# Patient Record
Sex: FEMALE | Race: WHITE | NOT HISPANIC OR LATINO
[De-identification: names, ages, dates, MRNs, and addresses within clinical notes are randomized per-mention and may not be internally consistent; named-entity substitution may affect disease eponyms.]

---

## 2018-06-22 ENCOUNTER — APPOINTMENT (OUTPATIENT)
Dept: ANESTHESIOLOGY | Facility: CLINIC | Age: 68
End: 2018-06-22

## 2018-06-22 ENCOUNTER — OUTPATIENT (OUTPATIENT)
Dept: OUTPATIENT SERVICES | Facility: HOSPITAL | Age: 68
LOS: 1 days | End: 2018-06-22
Payer: COMMERCIAL

## 2018-06-22 DIAGNOSIS — M54.16 RADICULOPATHY, LUMBAR REGION: ICD-10-CM

## 2018-06-22 PROBLEM — Z00.00 ENCOUNTER FOR PREVENTIVE HEALTH EXAMINATION: Status: ACTIVE | Noted: 2018-06-22

## 2018-06-22 PROCEDURE — 62323 NJX INTERLAMINAR LMBR/SAC: CPT

## 2018-06-22 PROCEDURE — 82962 GLUCOSE BLOOD TEST: CPT

## 2018-06-25 DIAGNOSIS — E11.65 TYPE 2 DIABETES MELLITUS WITH HYPERGLYCEMIA: ICD-10-CM

## 2018-06-25 DIAGNOSIS — M54.17 RADICULOPATHY, LUMBOSACRAL REGION: ICD-10-CM

## 2018-07-20 ENCOUNTER — APPOINTMENT (OUTPATIENT)
Dept: ANESTHESIOLOGY | Facility: CLINIC | Age: 68
End: 2018-07-20

## 2018-07-20 ENCOUNTER — OUTPATIENT (OUTPATIENT)
Dept: OUTPATIENT SERVICES | Facility: HOSPITAL | Age: 68
LOS: 1 days | End: 2018-07-20
Payer: COMMERCIAL

## 2018-07-20 DIAGNOSIS — M54.17 RADICULOPATHY, LUMBOSACRAL REGION: ICD-10-CM

## 2018-07-20 PROCEDURE — 82962 GLUCOSE BLOOD TEST: CPT

## 2018-07-20 PROCEDURE — 62323 NJX INTERLAMINAR LMBR/SAC: CPT

## 2018-07-23 DIAGNOSIS — E11.65 TYPE 2 DIABETES MELLITUS WITH HYPERGLYCEMIA: ICD-10-CM

## 2018-11-30 ENCOUNTER — APPOINTMENT (OUTPATIENT)
Dept: ANESTHESIOLOGY | Facility: CLINIC | Age: 68
End: 2018-11-30

## 2018-11-30 ENCOUNTER — OUTPATIENT (OUTPATIENT)
Dept: OUTPATIENT SERVICES | Facility: HOSPITAL | Age: 68
LOS: 1 days | End: 2018-11-30
Payer: MEDICARE

## 2018-11-30 DIAGNOSIS — M54.16 RADICULOPATHY, LUMBAR REGION: ICD-10-CM

## 2018-11-30 DIAGNOSIS — M54.17 RADICULOPATHY, LUMBOSACRAL REGION: ICD-10-CM

## 2018-11-30 PROCEDURE — 82962 GLUCOSE BLOOD TEST: CPT

## 2018-11-30 PROCEDURE — 62323 NJX INTERLAMINAR LMBR/SAC: CPT

## 2019-02-23 ENCOUNTER — INPATIENT (INPATIENT)
Facility: HOSPITAL | Age: 69
LOS: 2 days | Discharge: HOME | End: 2019-02-26
Attending: INTERNAL MEDICINE | Admitting: INTERNAL MEDICINE
Payer: COMMERCIAL

## 2019-02-23 VITALS
DIASTOLIC BLOOD PRESSURE: 57 MMHG | WEIGHT: 139.99 LBS | TEMPERATURE: 98 F | SYSTOLIC BLOOD PRESSURE: 11 MMHG | RESPIRATION RATE: 14 BRPM | HEART RATE: 91 BPM | HEIGHT: 65 IN

## 2019-02-23 DIAGNOSIS — E11.42 TYPE 2 DIABETES MELLITUS WITH DIABETIC POLYNEUROPATHY: ICD-10-CM

## 2019-02-23 DIAGNOSIS — E03.9 HYPOTHYROIDISM, UNSPECIFIED: ICD-10-CM

## 2019-02-23 DIAGNOSIS — J18.9 PNEUMONIA, UNSPECIFIED ORGANISM: ICD-10-CM

## 2019-02-23 DIAGNOSIS — N17.9 ACUTE KIDNEY FAILURE, UNSPECIFIED: ICD-10-CM

## 2019-02-23 DIAGNOSIS — Z29.9 ENCOUNTER FOR PROPHYLACTIC MEASURES, UNSPECIFIED: ICD-10-CM

## 2019-02-23 DIAGNOSIS — G62.9 POLYNEUROPATHY, UNSPECIFIED: ICD-10-CM

## 2019-02-23 LAB
ALBUMIN SERPL ELPH-MCNC: 2.8 G/DL — LOW (ref 3.3–5)
ALP SERPL-CCNC: 81 U/L — SIGNIFICANT CHANGE UP (ref 40–120)
ALT FLD-CCNC: 29 U/L — SIGNIFICANT CHANGE UP (ref 12–78)
ANION GAP SERPL CALC-SCNC: 12 MMOL/L — SIGNIFICANT CHANGE UP (ref 5–17)
APTT BLD: 31.2 SEC — SIGNIFICANT CHANGE UP (ref 27.5–36.3)
AST SERPL-CCNC: 30 U/L — SIGNIFICANT CHANGE UP (ref 15–37)
BASE EXCESS BLDA CALC-SCNC: -2.7 MMOL/L — LOW (ref -2–2)
BILIRUB SERPL-MCNC: 0.6 MG/DL — SIGNIFICANT CHANGE UP (ref 0.2–1.2)
BLOOD GAS COMMENTS: SIGNIFICANT CHANGE UP
BLOOD GAS COMMENTS: SIGNIFICANT CHANGE UP
BLOOD GAS SOURCE: SIGNIFICANT CHANGE UP
BUN SERPL-MCNC: 29 MG/DL — HIGH (ref 7–23)
CALCIUM SERPL-MCNC: 8.9 MG/DL — SIGNIFICANT CHANGE UP (ref 8.5–10.1)
CHLORIDE SERPL-SCNC: 99 MMOL/L — SIGNIFICANT CHANGE UP (ref 96–108)
CK MB CFR SERPL CALC: 1 NG/ML — SIGNIFICANT CHANGE UP (ref 0.5–3.6)
CO2 SERPL-SCNC: 23 MMOL/L — SIGNIFICANT CHANGE UP (ref 22–31)
CREAT SERPL-MCNC: 2.16 MG/DL — HIGH (ref 0.5–1.3)
FLU A RESULT: SIGNIFICANT CHANGE UP
FLU A RESULT: SIGNIFICANT CHANGE UP
FLUAV AG NPH QL: SIGNIFICANT CHANGE UP
FLUBV AG NPH QL: SIGNIFICANT CHANGE UP
GLUCOSE SERPL-MCNC: 180 MG/DL — HIGH (ref 70–99)
HCO3 BLDA-SCNC: 21 MMOL/L — SIGNIFICANT CHANGE UP (ref 21–29)
HOROWITZ INDEX BLDA+IHG-RTO: 32 — SIGNIFICANT CHANGE UP
INR BLD: 1.17 RATIO — HIGH (ref 0.88–1.16)
LACTATE SERPL-SCNC: 5.1 MMOL/L — CRITICAL HIGH (ref 0.7–2)
MAGNESIUM SERPL-MCNC: 2.2 MG/DL — SIGNIFICANT CHANGE UP (ref 1.6–2.6)
NT-PROBNP SERPL-SCNC: 493 PG/ML — HIGH (ref 0–125)
PCO2 BLDA: 36 MMHG — SIGNIFICANT CHANGE UP (ref 32–46)
PH BLD: 7.39 — SIGNIFICANT CHANGE UP (ref 7.35–7.45)
PO2 BLDA: 71 MMHG — LOW (ref 74–108)
POTASSIUM SERPL-MCNC: 3.7 MMOL/L — SIGNIFICANT CHANGE UP (ref 3.5–5.3)
POTASSIUM SERPL-SCNC: 3.7 MMOL/L — SIGNIFICANT CHANGE UP (ref 3.5–5.3)
PROT SERPL-MCNC: 8.9 GM/DL — HIGH (ref 6–8.3)
PROTHROM AB SERPL-ACNC: 13.2 SEC — HIGH (ref 10–12.9)
RSV RESULT: SIGNIFICANT CHANGE UP
RSV RNA RESP QL NAA+PROBE: SIGNIFICANT CHANGE UP
SAO2 % BLDA: 94 % — SIGNIFICANT CHANGE UP (ref 92–96)
SODIUM SERPL-SCNC: 134 MMOL/L — LOW (ref 135–145)
TROPONIN I SERPL-MCNC: <.015 NG/ML — SIGNIFICANT CHANGE UP (ref 0.01–0.04)

## 2019-02-23 PROCEDURE — 71045 X-RAY EXAM CHEST 1 VIEW: CPT | Mod: 26

## 2019-02-23 PROCEDURE — 99223 1ST HOSP IP/OBS HIGH 75: CPT | Mod: AI

## 2019-02-23 PROCEDURE — 99291 CRITICAL CARE FIRST HOUR: CPT

## 2019-02-23 RX ORDER — DEXTROSE 50 % IN WATER 50 %
12.5 SYRINGE (ML) INTRAVENOUS ONCE
Qty: 0 | Refills: 0 | Status: DISCONTINUED | OUTPATIENT
Start: 2019-02-23 | End: 2019-02-26

## 2019-02-23 RX ORDER — AZITHROMYCIN 500 MG/1
500 TABLET, FILM COATED ORAL EVERY 24 HOURS
Qty: 0 | Refills: 0 | Status: DISCONTINUED | OUTPATIENT
Start: 2019-02-24 | End: 2019-02-26

## 2019-02-23 RX ORDER — DEXTROSE 50 % IN WATER 50 %
25 SYRINGE (ML) INTRAVENOUS ONCE
Qty: 0 | Refills: 0 | Status: DISCONTINUED | OUTPATIENT
Start: 2019-02-23 | End: 2019-02-26

## 2019-02-23 RX ORDER — LANOLIN ALCOHOL/MO/W.PET/CERES
3 CREAM (GRAM) TOPICAL ONCE
Qty: 0 | Refills: 0 | Status: COMPLETED | OUTPATIENT
Start: 2019-02-23 | End: 2019-02-23

## 2019-02-23 RX ORDER — INSULIN LISPRO 100/ML
VIAL (ML) SUBCUTANEOUS
Qty: 0 | Refills: 0 | Status: DISCONTINUED | OUTPATIENT
Start: 2019-02-23 | End: 2019-02-26

## 2019-02-23 RX ORDER — LEVOTHYROXINE SODIUM 125 MCG
25 TABLET ORAL DAILY
Qty: 0 | Refills: 0 | Status: DISCONTINUED | OUTPATIENT
Start: 2019-02-23 | End: 2019-02-26

## 2019-02-23 RX ORDER — AZITHROMYCIN 500 MG/1
500 TABLET, FILM COATED ORAL ONCE
Qty: 0 | Refills: 0 | Status: COMPLETED | OUTPATIENT
Start: 2019-02-23 | End: 2019-02-23

## 2019-02-23 RX ORDER — GLUCAGON INJECTION, SOLUTION 0.5 MG/.1ML
1 INJECTION, SOLUTION SUBCUTANEOUS ONCE
Qty: 0 | Refills: 0 | Status: DISCONTINUED | OUTPATIENT
Start: 2019-02-23 | End: 2019-02-26

## 2019-02-23 RX ORDER — IPRATROPIUM/ALBUTEROL SULFATE 18-103MCG
3 AEROSOL WITH ADAPTER (GRAM) INHALATION EVERY 6 HOURS
Qty: 0 | Refills: 0 | Status: DISCONTINUED | OUTPATIENT
Start: 2019-02-23 | End: 2019-02-26

## 2019-02-23 RX ORDER — SODIUM CHLORIDE 9 MG/ML
2500 INJECTION INTRAMUSCULAR; INTRAVENOUS; SUBCUTANEOUS ONCE
Qty: 0 | Refills: 0 | Status: COMPLETED | OUTPATIENT
Start: 2019-02-23 | End: 2019-02-23

## 2019-02-23 RX ORDER — CEFTRIAXONE 500 MG/1
1 INJECTION, POWDER, FOR SOLUTION INTRAMUSCULAR; INTRAVENOUS ONCE
Qty: 0 | Refills: 0 | Status: COMPLETED | OUTPATIENT
Start: 2019-02-23 | End: 2019-02-23

## 2019-02-23 RX ORDER — CEFTRIAXONE 500 MG/1
1 INJECTION, POWDER, FOR SOLUTION INTRAMUSCULAR; INTRAVENOUS EVERY 24 HOURS
Qty: 0 | Refills: 0 | Status: DISCONTINUED | OUTPATIENT
Start: 2019-02-24 | End: 2019-02-26

## 2019-02-23 RX ORDER — HEPARIN SODIUM 5000 [USP'U]/ML
5000 INJECTION INTRAVENOUS; SUBCUTANEOUS EVERY 12 HOURS
Qty: 0 | Refills: 0 | Status: DISCONTINUED | OUTPATIENT
Start: 2019-02-23 | End: 2019-02-26

## 2019-02-23 RX ORDER — GABAPENTIN 400 MG/1
300 CAPSULE ORAL THREE TIMES A DAY
Qty: 0 | Refills: 0 | Status: DISCONTINUED | OUTPATIENT
Start: 2019-02-23 | End: 2019-02-24

## 2019-02-23 RX ORDER — SODIUM CHLORIDE 9 MG/ML
1000 INJECTION, SOLUTION INTRAVENOUS
Qty: 0 | Refills: 0 | Status: DISCONTINUED | OUTPATIENT
Start: 2019-02-23 | End: 2019-02-26

## 2019-02-23 RX ORDER — DEXTROSE 50 % IN WATER 50 %
15 SYRINGE (ML) INTRAVENOUS ONCE
Qty: 0 | Refills: 0 | Status: DISCONTINUED | OUTPATIENT
Start: 2019-02-23 | End: 2019-02-26

## 2019-02-23 RX ADMIN — AZITHROMYCIN 255 MILLIGRAM(S): 500 TABLET, FILM COATED ORAL at 21:39

## 2019-02-23 RX ADMIN — CEFTRIAXONE 100 GRAM(S): 500 INJECTION, POWDER, FOR SOLUTION INTRAMUSCULAR; INTRAVENOUS at 20:03

## 2019-02-23 RX ADMIN — SODIUM CHLORIDE 2500 MILLILITER(S): 9 INJECTION INTRAMUSCULAR; INTRAVENOUS; SUBCUTANEOUS at 21:43

## 2019-02-23 NOTE — H&P ADULT - NSHPPHYSICALEXAM_GEN_ALL_CORE
Vital Signs Last 24 Hrs  T(C): 36.9 (23 Feb 2019 21:41), Max: 36.9 (23 Feb 2019 21:41)  T(F): 98.5 (23 Feb 2019 21:41), Max: 98.5 (23 Feb 2019 21:41)  HR: 92 (23 Feb 2019 21:41) (91 - 92)  BP: 115/62 (23 Feb 2019 21:41) (11/57 - 115/62)  BP(mean): --  RR: 18 (23 Feb 2019 21:41) (14 - 18)  SpO2: 98% (23 Feb 2019 21:41) (98% - 98%)    PHYSICAL EXAM:    GENERAL: thin frail appearing female appears mildly sob.  able to speak comfortably  HEAD:  Atraumatic, Normocephalic  EYES: EOMI, PERRLA, conjunctiva and sclera clear  ENMT: No tonsillar erythema, exudates, or enlargement; mucous membranes dry, No lesions  NECK: Supple, No JVD, Normal thyroid  NERVOUS SYSTEM:  Alert & Oriented X3, Good concentration; Motor Strength 5/5 B/L upper and lower extremities; DTRs 2+ intact and symmetric  CHEST/LUNG: decreased bs through out scattered wheeze,   HEART: Regular rate and rhythm; No rubs, or gallops, +S1,S2  ABDOMEN: Soft, Nontender, Nondistended; Bowel sounds present  EXTREMITIES:  2+ Peripheral Pulses, No clubbing, cyanosis, or edema  LYMPH: No cervical adenopathy  RECTAL: deferred  BREAST: deferred  : deferred  SKIN: No rashes or lesions    IMPROVE VTE Individual Risk Assessment          RISK                                                          Points  [  ] Previous VTE                                                3  [  ] Thrombophilia                                             2  [  ] Lower limb paralysis                                    2        (unable to hold up >15 seconds)    [  ] Current Cancer                                             2         (within 6 months)  [ x ] Immobilization > 24 hrs                              1  [  ] ICU/CCU stay > 24 hours                            1  [  x] Age > 60                                                    1  IMPROVE VTE Score _____2____

## 2019-02-23 NOTE — ED PROVIDER NOTE - CLINICAL SUMMARY MEDICAL DECISION MAKING FREE TEXT BOX
patient pw cough and sob. lung exam concerning for pna vs bronchitis vs flu. will start abx empirically and give stat nebs. I read ekg as nsr rate 79, septal qs, no st elevation or depression, qtc 509, normal qrs and pr. patient pw cough and sob. lung exam concerning for pna vs bronchitis vs flu. will start abx empirically and give stat nebs. I read ekg as nsr rate 79, septal qs, no st elevation or depression, qtc 509, normal qrs and pr. lactic acid elevated, will give fluids and recheck.

## 2019-02-23 NOTE — ED PROVIDER NOTE - PHYSICAL EXAMINATION
Gen: Alert, NAD  Head: NC, AT   Eyes: PERRL, EOMI, normal lids/conjunctiva  ENT: normal hearing, patent oropharynx without erythema/exudate, uvula midline  Neck: supple, no tenderness, Trachea midline  Pulm: coarse ronchi bl, right > left. tachypnea, but speaking in full sentences   CV: RRR, no M/R/G, 2+ radial and dp pulses bl, no edema  Abd: soft, NT/ND, +BS, no hepatosplenomegaly  Mskel: extremities x4 with normal ROM and no joint effusions. no ctl spine ttp.   Skin: no rash, no bruising   Neuro: AAOx3, no sensory/motor deficits, CN 2-12 intact

## 2019-02-23 NOTE — ED PROVIDER NOTE - OBJECTIVE STATEMENT
Pertinent PMH/PSH/FHx/SHx and Review of Systems contained within:  68F former smoker, htn, dm pw 1 week cough, chills, and sob. patient went to urgent care and was told to come to the er. no fever but weakness and chills. no nausea, vomiting, abd pain, cp, ha, vision loss, rhinorrhea, rash, bleeding or weakness. she took the flu and pna vaccines this year. she hasn't taken anything for her symptoms.   Fh and Sh not otherwise contributory  ROS otherwise negative

## 2019-02-23 NOTE — H&P ADULT - HISTORY OF PRESENT ILLNESS
Pt is a 69 y/o female w/pmhx of hypothyroid, DM2, peripheral neuropathy and smoker quit 2months ago(~20pk yr) comes in w/complain of non productive cough x 2weeks, progressive ceron, over the last few days barely able to get out of bed went to urgent care and was told to come to ed.  Pt denies any fever, chills, cp, palpitations, n/v/d/c no travels or sick contacts.  Pt not aware of any kidney problems and never dx'd w/copd or had breathign problems in past.  in ed found to have pna and renal insufficiency being admitted for further managment.

## 2019-02-23 NOTE — ED ADULT NURSE NOTE - OBJECTIVE STATEMENT
68 year old female with c/o trouble breathing, non productive cough, sweating, and tiredness for 2 weeks Symptoms on and off. She was seen at Urgent care and referred to ed. She was wheezing.  Pt on oxygen 2 liters via nasal cannula oxygen saturation 93 % on room air oxygen saturation 89%. She is a non smoker

## 2019-02-23 NOTE — ED ADULT NURSE NOTE - NSSISCREENINGQ3_ED_A_ED
"Chief Complaint   Patient presents with   • Follow-up     6 months for allergies. Pt states her throat has been sore X 4 days.      Subjective   Mary Kay Bullock is a 33 y.o. female.     HPI Comments: Here for follow up of allergies.   Her allergies have been stable.  Occasional sneezing and RN, but rare.  She takes Claritin daily.  Has not had an anaphylactic reaction to anything in many years and has not needed her EpiPen for years.    Has had ST and cough for 4 days.  Her daughter is in  and brought home URI sxs.  Pt has not had fevers or chills.  No EA or RN.  No NVD.  She has good appetite.    She is taking claritin daily.        The following portions of the patient's history were reviewed and updated as appropriate: allergies, current medications, past family history, past medical history, past social history, past surgical history and problem list.    Review of Systems   Constitutional: Negative for chills and fever.   HENT: Positive for sore throat. Negative for congestion and ear pain.    Respiratory: Positive for cough. Negative for shortness of breath.    Allergic/Immunologic: Positive for environmental allergies.   All other systems reviewed and are negative.      Objective   /60  Pulse 71  Temp 97.8 °F (36.6 °C)  Ht 152 cm (59.84\")  Wt 46.3 kg (102 lb)  SpO2 97%  BMI 20.03 kg/m2  Body mass index is 20.03 kg/(m^2).  Physical Exam   Constitutional: She is oriented to person, place, and time. She appears well-developed and well-nourished.   HENT:   Head: Normocephalic and atraumatic.   Right Ear: External ear normal.   Left Ear: External ear normal.   Mouth/Throat: Oropharynx is clear and moist.   Turbinates slightly inflamed with watery rhinorrhea and clear postnasal drip, no sinus tenderness   Eyes: Conjunctivae and EOM are normal. Pupils are equal, round, and reactive to light.   Neck: Normal range of motion. Neck supple. No thyromegaly present.   Cardiovascular: Normal rate, regular " rhythm and normal heart sounds.    No murmur heard.  Pulmonary/Chest: Effort normal and breath sounds normal. No respiratory distress. She has no wheezes. She has no rales.   Lymphadenopathy:     She has no cervical adenopathy.   Neurological: She is alert and oriented to person, place, and time. No cranial nerve deficit.   Psychiatric: She has a normal mood and affect. Judgment normal.   Nursing note and vitals reviewed.      Assessment/Plan   Mary Kay Bullock is here today and the following problems have been addressed:      Mary Kay was seen today for follow-up.    Diagnoses and all orders for this visit:    Acute nasopharyngitis    Chronic seasonal allergic rhinitis due to pollen    continue daily claritin  Recommend salt water gargles  May take tylenol for pain  She may call when she needs an EpiPen refill    RTC one yr or prn    Please note that portions of this note were completed with a voice recognition program.  Efforts were made to edit dictation, but occasionally words are mistranscribed.   No

## 2019-02-23 NOTE — H&P ADULT - NSHPLABSRESULTS_GEN_ALL_CORE
LABS:    02-23    134<L>  |  99  |  29<H>  ----------------------------<  180<H>  3.7   |  23  |  2.16<H>    Ca    8.9      23 Feb 2019 20:00  Mg     2.2     02-23    TPro  8.9<H>  /  Alb  2.8<L>  /  TBili  0.6  /  DBili  x   /  AST  30  /  ALT  29  /  AlkPhos  81  02-23    PT/INR - ( 23 Feb 2019 20:00 )   PT: 13.2 sec;   INR: 1.17 ratio         PTT - ( 23 Feb 2019 20:00 )  PTT:31.2 sec    CAPILLARY BLOOD GLUCOSE          RADIOLOGY & ADDITIONAL TESTS:    Imaging Personally Reviewed:  [ X] YES  [ ] NO

## 2019-02-23 NOTE — ED PROVIDER NOTE - CARE PLAN
Principal Discharge DX:	Bronchitis Principal Discharge DX:	Bronchitis  Secondary Diagnosis:	CAP (community acquired pneumonia)

## 2019-02-23 NOTE — ED ADULT TRIAGE NOTE - CHIEF COMPLAINT QUOTE
pt states " 2 weeks ago began feeling very tired and difficulty making a full breath." pt was sent by urgent care." upon assessment bilateral expiratory wheezing.

## 2019-02-23 NOTE — ED ADULT NURSE NOTE - NSIMPLEMENTINTERV_GEN_ALL_ED
Implemented All Fall Risk Interventions:  Francisco to call system. Call bell, personal items and telephone within reach. Instruct patient to call for assistance. Room bathroom lighting operational. Non-slip footwear when patient is off stretcher. Physically safe environment: no spills, clutter or unnecessary equipment. Stretcher in lowest position, wheels locked, appropriate side rails in place. Provide visual cue, wrist band, yellow gown, etc. Monitor gait and stability. Monitor for mental status changes and reorient to person, place, and time. Review medications for side effects contributing to fall risk. Reinforce activity limits and safety measures with patient and family.

## 2019-02-24 DIAGNOSIS — B18.2 CHRONIC VIRAL HEPATITIS C: ICD-10-CM

## 2019-02-24 DIAGNOSIS — A41.9 SEPSIS, UNSPECIFIED ORGANISM: ICD-10-CM

## 2019-02-24 LAB
ANION GAP SERPL CALC-SCNC: 11 MMOL/L — SIGNIFICANT CHANGE UP (ref 5–17)
ANISOCYTOSIS BLD QL: SLIGHT — SIGNIFICANT CHANGE UP
APPEARANCE UR: CLEAR — SIGNIFICANT CHANGE UP
BACTERIA # UR AUTO: ABNORMAL
BASOPHILS # BLD AUTO: 0 K/UL — SIGNIFICANT CHANGE UP (ref 0–0.2)
BASOPHILS NFR BLD AUTO: 0 % — SIGNIFICANT CHANGE UP (ref 0–2)
BILIRUB UR-MCNC: NEGATIVE — SIGNIFICANT CHANGE UP
BUN SERPL-MCNC: 29 MG/DL — HIGH (ref 7–23)
CALCIUM SERPL-MCNC: 8 MG/DL — LOW (ref 8.5–10.1)
CHLORIDE SERPL-SCNC: 103 MMOL/L — SIGNIFICANT CHANGE UP (ref 96–108)
CO2 SERPL-SCNC: 22 MMOL/L — SIGNIFICANT CHANGE UP (ref 22–31)
COLOR SPEC: YELLOW — SIGNIFICANT CHANGE UP
CREAT SERPL-MCNC: 1.71 MG/DL — HIGH (ref 0.5–1.3)
DIFF PNL FLD: ABNORMAL
EOSINOPHIL # BLD AUTO: 0 K/UL — SIGNIFICANT CHANGE UP (ref 0–0.5)
EOSINOPHIL NFR BLD AUTO: 0 % — SIGNIFICANT CHANGE UP (ref 0–6)
GLUCOSE BLDC GLUCOMTR-MCNC: 162 MG/DL — HIGH (ref 70–99)
GLUCOSE BLDC GLUCOMTR-MCNC: 171 MG/DL — HIGH (ref 70–99)
GLUCOSE BLDC GLUCOMTR-MCNC: 172 MG/DL — HIGH (ref 70–99)
GLUCOSE BLDC GLUCOMTR-MCNC: 230 MG/DL — HIGH (ref 70–99)
GLUCOSE SERPL-MCNC: 130 MG/DL — HIGH (ref 70–99)
GLUCOSE UR QL: NEGATIVE MG/DL — SIGNIFICANT CHANGE UP
HBA1C BLD-MCNC: 7.4 % — HIGH (ref 4–5.6)
HCT VFR BLD CALC: 30.3 % — LOW (ref 34.5–45)
HCV AB S/CO SERPL IA: 11.33 S/CO — HIGH (ref 0–0.79)
HCV AB SERPL-IMP: REACTIVE
HGB BLD-MCNC: 10.1 G/DL — LOW (ref 11.5–15.5)
KETONES UR-MCNC: NEGATIVE — SIGNIFICANT CHANGE UP
LACTATE SERPL-SCNC: 1.3 MMOL/L — SIGNIFICANT CHANGE UP (ref 0.7–2)
LEUKOCYTE ESTERASE UR-ACNC: NEGATIVE — SIGNIFICANT CHANGE UP
LYMPHOCYTES # BLD AUTO: 13 % — SIGNIFICANT CHANGE UP (ref 13–44)
LYMPHOCYTES # BLD AUTO: 2.72 K/UL — SIGNIFICANT CHANGE UP (ref 1–3.3)
MACROCYTES BLD QL: SLIGHT — SIGNIFICANT CHANGE UP
MANUAL SMEAR VERIFICATION: SIGNIFICANT CHANGE UP
MCHC RBC-ENTMCNC: 30.5 PG — SIGNIFICANT CHANGE UP (ref 27–34)
MCHC RBC-ENTMCNC: 33.3 GM/DL — SIGNIFICANT CHANGE UP (ref 32–36)
MCV RBC AUTO: 91.5 FL — SIGNIFICANT CHANGE UP (ref 80–100)
MICROCYTES BLD QL: SLIGHT — SIGNIFICANT CHANGE UP
MONOCYTES # BLD AUTO: 1.05 K/UL — HIGH (ref 0–0.9)
MONOCYTES NFR BLD AUTO: 5 % — SIGNIFICANT CHANGE UP (ref 2–14)
NEUTROPHILS # BLD AUTO: 17.16 K/UL — HIGH (ref 1.8–7.4)
NEUTROPHILS NFR BLD AUTO: 82 % — HIGH (ref 43–77)
NITRITE UR-MCNC: NEGATIVE — SIGNIFICANT CHANGE UP
NRBC # BLD: 0 /100 — SIGNIFICANT CHANGE UP (ref 0–0)
NRBC # BLD: SIGNIFICANT CHANGE UP /100 WBCS (ref 0–0)
PH UR: 6 — SIGNIFICANT CHANGE UP (ref 5–8)
PLAT MORPH BLD: NORMAL — SIGNIFICANT CHANGE UP
PLATELET # BLD AUTO: 354 K/UL — SIGNIFICANT CHANGE UP (ref 150–400)
POTASSIUM SERPL-MCNC: 3.7 MMOL/L — SIGNIFICANT CHANGE UP (ref 3.5–5.3)
POTASSIUM SERPL-SCNC: 3.7 MMOL/L — SIGNIFICANT CHANGE UP (ref 3.5–5.3)
PROT UR-MCNC: 15 MG/DL
RBC # BLD: 3.31 M/UL — LOW (ref 3.8–5.2)
RBC # FLD: 12.3 % — SIGNIFICANT CHANGE UP (ref 10.3–14.5)
RBC BLD AUTO: NORMAL — SIGNIFICANT CHANGE UP
RBC CASTS # UR COMP ASSIST: SIGNIFICANT CHANGE UP /HPF (ref 0–4)
SODIUM SERPL-SCNC: 136 MMOL/L — SIGNIFICANT CHANGE UP (ref 135–145)
SP GR SPEC: 1.01 — SIGNIFICANT CHANGE UP (ref 1.01–1.02)
UROBILINOGEN FLD QL: NEGATIVE MG/DL — SIGNIFICANT CHANGE UP
WBC # BLD: 20.93 K/UL — HIGH (ref 3.8–10.5)
WBC # FLD AUTO: 20.93 K/UL — HIGH (ref 3.8–10.5)
WBC UR QL: SIGNIFICANT CHANGE UP

## 2019-02-24 PROCEDURE — 71250 CT THORAX DX C-: CPT | Mod: 26

## 2019-02-24 PROCEDURE — 99233 SBSQ HOSP IP/OBS HIGH 50: CPT

## 2019-02-24 RX ORDER — SITAGLIPTIN AND METFORMIN HYDROCHLORIDE 500; 50 MG/1; MG/1
1 TABLET, FILM COATED ORAL
Qty: 0 | Refills: 0 | COMMUNITY

## 2019-02-24 RX ORDER — GABAPENTIN 400 MG/1
1 CAPSULE ORAL
Qty: 0 | Refills: 0 | COMMUNITY

## 2019-02-24 RX ORDER — GABAPENTIN 400 MG/1
300 CAPSULE ORAL DAILY
Qty: 0 | Refills: 0 | Status: DISCONTINUED | OUTPATIENT
Start: 2019-02-24 | End: 2019-02-26

## 2019-02-24 RX ORDER — SODIUM CHLORIDE 9 MG/ML
1000 INJECTION INTRAMUSCULAR; INTRAVENOUS; SUBCUTANEOUS
Qty: 0 | Refills: 0 | Status: DISCONTINUED | OUTPATIENT
Start: 2019-02-24 | End: 2019-02-26

## 2019-02-24 RX ORDER — OXYCODONE AND ACETAMINOPHEN 5; 325 MG/1; MG/1
1 TABLET ORAL EVERY 4 HOURS
Qty: 0 | Refills: 0 | Status: DISCONTINUED | OUTPATIENT
Start: 2019-02-24 | End: 2019-02-26

## 2019-02-24 RX ORDER — LANOLIN ALCOHOL/MO/W.PET/CERES
3 CREAM (GRAM) TOPICAL ONCE
Qty: 0 | Refills: 0 | Status: COMPLETED | OUTPATIENT
Start: 2019-02-24 | End: 2019-02-24

## 2019-02-24 RX ORDER — MORPHINE SULFATE 50 MG/1
2 CAPSULE, EXTENDED RELEASE ORAL EVERY 6 HOURS
Qty: 0 | Refills: 0 | Status: DISCONTINUED | OUTPATIENT
Start: 2019-02-24 | End: 2019-02-26

## 2019-02-24 RX ADMIN — CEFTRIAXONE 100 GRAM(S): 500 INJECTION, POWDER, FOR SOLUTION INTRAMUSCULAR; INTRAVENOUS at 20:04

## 2019-02-24 RX ADMIN — Medication 3 MILLILITER(S): at 05:48

## 2019-02-24 RX ADMIN — Medication 3 MILLIGRAM(S): at 00:33

## 2019-02-24 RX ADMIN — SODIUM CHLORIDE 100 MILLILITER(S): 9 INJECTION INTRAMUSCULAR; INTRAVENOUS; SUBCUTANEOUS at 20:07

## 2019-02-24 RX ADMIN — Medication 2: at 07:44

## 2019-02-24 RX ADMIN — HEPARIN SODIUM 5000 UNIT(S): 5000 INJECTION INTRAVENOUS; SUBCUTANEOUS at 06:37

## 2019-02-24 RX ADMIN — GABAPENTIN 300 MILLIGRAM(S): 400 CAPSULE ORAL at 11:22

## 2019-02-24 RX ADMIN — Medication 3 MILLILITER(S): at 00:17

## 2019-02-24 RX ADMIN — SODIUM CHLORIDE 100 MILLILITER(S): 9 INJECTION INTRAMUSCULAR; INTRAVENOUS; SUBCUTANEOUS at 14:56

## 2019-02-24 RX ADMIN — Medication 25 MICROGRAM(S): at 06:37

## 2019-02-24 RX ADMIN — Medication 2: at 16:13

## 2019-02-24 RX ADMIN — Medication 2: at 11:22

## 2019-02-24 RX ADMIN — Medication 3 MILLILITER(S): at 11:07

## 2019-02-24 RX ADMIN — Medication 3 MILLILITER(S): at 17:35

## 2019-02-24 RX ADMIN — AZITHROMYCIN 255 MILLIGRAM(S): 500 TABLET, FILM COATED ORAL at 20:56

## 2019-02-24 RX ADMIN — Medication 100 MILLIGRAM(S): at 21:46

## 2019-02-24 RX ADMIN — HEPARIN SODIUM 5000 UNIT(S): 5000 INJECTION INTRAVENOUS; SUBCUTANEOUS at 17:11

## 2019-02-24 RX ADMIN — Medication 3 MILLIGRAM(S): at 23:14

## 2019-02-24 RX ADMIN — Medication 3 MILLILITER(S): at 00:15

## 2019-02-24 RX ADMIN — Medication 3 MILLILITER(S): at 23:42

## 2019-02-24 RX ADMIN — Medication 100 MILLIGRAM(S): at 13:45

## 2019-02-24 NOTE — PROVIDER CONTACT NOTE (CRITICAL VALUE NOTIFICATION) - ACTION/TREATMENT ORDERED:
Md is made aware and  gave order to start IVF. IVF initiated. Tolerated well. Will continue to monitor.
No orders at this time

## 2019-02-24 NOTE — PROGRESS NOTE ADULT - SUBJECTIVE AND OBJECTIVE BOX
HPI:  Pt is a 69 y/o female w/pmhx of hypothyroid, DM2, peripheral neuropathy and smoker quit 2months ago(~20pk yr) comes in w/complain of non productive cough x 2weeks, progressive ceron, over the last few days barely able to get out of bed went to urgent care and was told to come to ed.  Pt denies any fever, chills, cp, palpitations, n/v/d/c no travels or sick contacts.  Pt not aware of any kidney problems and never dx'd w/copd or had breathing problems in past.  In ed found to have pna and renal insufficiency being admitted for further management (23 Feb 2019 22:26)    Patient is a 68y old  Female who presents with a chief complaint of fatigue and sob (23 Feb 2019 22:26)      INTERVAL HPI/OVERNIGHT EVENTS: New admission feel better now than 12 hours ago     MEDICATIONS  (STANDING):  ALBUTerol/ipratropium for Nebulization 3 milliLiter(s) Nebulizer every 6 hours  azithromycin  IVPB 500 milliGRAM(s) IV Intermittent every 24 hours  benzonatate 100 milliGRAM(s) Oral every 8 hours  cefTRIAXone   IVPB 1 Gram(s) IV Intermittent every 24 hours  dextrose 5%. 1000 milliLiter(s) (50 mL/Hr) IV Continuous <Continuous>  dextrose 50% Injectable 12.5 Gram(s) IV Push once  dextrose 50% Injectable 25 Gram(s) IV Push once  dextrose 50% Injectable 25 Gram(s) IV Push once  gabapentin 300 milliGRAM(s) Oral daily  heparin  Injectable 5000 Unit(s) SubCutaneous every 12 hours  insulin lispro (HumaLOG) corrective regimen sliding scale   SubCutaneous three times a day before meals  levothyroxine 25 MICROGram(s) Oral daily  sodium chloride 0.9%. 1000 milliLiter(s) (100 mL/Hr) IV Continuous <Continuous>    MEDICATIONS  (PRN):  dextrose 40% Gel 15 Gram(s) Oral once PRN Blood Glucose LESS THAN 70 milliGRAM(s)/deciliter  glucagon  Injectable 1 milliGRAM(s) IntraMuscular once PRN Glucose LESS THAN 70 milligrams/deciliter  HYDROcodone/homatropine Syrup 5 milliLiter(s) Oral every 8 hours PRN Cough  morphine  - Injectable 2 milliGRAM(s) IV Push every 6 hours PRN Severe Pain (7 - 10)  oxyCODONE    5 mG/acetaminophen 325 mG 1 Tablet(s) Oral every 4 hours PRN Moderate Pain (4 - 6)      Allergies    No Known Allergies    Intolerances        REVIEW OF SYSTEMS:  CONSTITUTIONAL: No fever, weight loss, or fatigue  EYES: No eye pain, visual disturbances, or discharge  ENMT:  No difficulty hearing, tinnitus, vertigo; No sinus or throat pain  NECK: No pain or stiffness  BREASTS: No pain, masses, or nipple discharge  RESPIRATORY: cough,  No shortness of breath  CARDIOVASCULAR: No chest pain, palpitations, dizziness, or leg swelling  GASTROINTESTINAL: No abdominal or epigastric pain. No nausea, vomiting, or hematemesis; No diarrhea or constipation. No melena or hematochezia.  GENITOURINARY: No dysuria, frequency, hematuria, or incontinence  NEUROLOGICAL: No headaches, memory loss, loss of strength, numbness, or tremors  SKIN: No itching, burning, rashes, or lesions   LYMPH NODES: No enlarged glands  ENDOCRINE: No heat or cold intolerance; No hair loss  MUSCULOSKELETAL: No joint pain or swelling; No muscle, back, or extremity pain  PSYCHIATRIC: No depression, anxiety, mood swings, or difficulty sleeping  HEME/LYMPH: No easy bruising, or bleeding gums  ALLERGY AND IMMUNOLOGIC: No hives or eczema    Vital Signs Last 24 Hrs  T(C): 37.7 (24 Feb 2019 05:11), Max: 37.7 (24 Feb 2019 05:11)  T(F): 99.9 (24 Feb 2019 05:11), Max: 99.9 (24 Feb 2019 05:11)  HR: 84 (24 Feb 2019 12:23) (78 - 92)  BP: 116/78 (24 Feb 2019 05:11) (11/57 - 124/78)  BP(mean): --  RR: 17 (24 Feb 2019 05:11) (14 - 18)  SpO2: 95% (24 Feb 2019 12:23) (91% - 98%)    PHYSICAL EXAM:  GENERAL: NAD, well-groomed, well-developed  HEAD:  Atraumatic, Normocephalic  EYES: EOMI, PERRLA, conjunctiva and sclera clear  ENMT: No tonsillar erythema, exudates, or enlargement; Moist mucous membranes, Good dentition, No lesions  NECK: Supple, No JVD, Normal thyroid  NERVOUS SYSTEM:  Alert & Oriented X3, Good concentration; Motor Strength 5/5 B/L upper and lower extremities; DTRs 2+ intact and symmetric  CHEST/LUNG: Clear to percussion bilaterally;, wheezing,   HEART: Regular rate and rhythm; No murmurs, rubs, or gallops  ABDOMEN: Soft, Nontender, Nondistended; Bowel sounds present  EXTREMITIES:  2+ Peripheral Pulses, No clubbing, cyanosis, or edema  LYMPH: No lymphadenopathy noted  SKIN: No rashes or lesions    LABS:                        10.1   20.93 )-----------( 354      ( 24 Feb 2019 00:22 )             30.3     02-24    136  |  103  |  29<H>  ----------------------------<  130<H>  3.7   |  22  |  1.71<H>    Ca    8.0<L>      24 Feb 2019 00:22  Mg     2.2     02-23    TPro  8.9<H>  /  Alb  2.8<L>  /  TBili  0.6  /  DBili  x   /  AST  30  /  ALT  29  /  AlkPhos  81  02-23    PT/INR - ( 23 Feb 2019 20:00 )   PT: 13.2 sec;   INR: 1.17 ratio         PTT - ( 23 Feb 2019 20:00 )  PTT:31.2 sec    CAPILLARY BLOOD GLUCOSE      POCT Blood Glucose.: 172 mg/dL (24 Feb 2019 11:08)  POCT Blood Glucose.: 171 mg/dL (24 Feb 2019 07:23)      RADIOLOGY & ADDITIONAL TESTS:  < from: Xray Chest 1 View-PORTABLE IMMEDIATE (02.23.19 @ 19:14) >    EXAM:  XR CHEST PORTABLE IMMED 1V                            PROCEDURE DATE:  02/23/2019          INTERPRETATION:  Clinical Information: Pneumonia    Technique: AP chest image.     Comparison: 01/11/2011    Findings: The heart is unremarkable. The lungs are clear. Bones are   unremarkable for age.    Impression: Clear lungs.    < end of copied text >    Imaging Personally Reviewed:  [X ] YES  [ ] NO    Consultant(s) Notes Reviewed:  [ X] YES  [ ] NO    Care Discussed with Consultants/Other Providers [X ] YES  [ ] NO

## 2019-02-24 NOTE — CONSULT NOTE ADULT - SUBJECTIVE AND OBJECTIVE BOX
Patient is a 68y old  Female who presents with a chief complaint of fatigue and sob (24 Feb 2019 13:43)    HPI:  69 y/o female with Hypothyroid, DM2, HTN, Peripheral Neuropathy, Spinal stenosis and smoker for 52 years( quit 2months ago )   Cames in w/complain of non productive cough x 2weeks, progressive ceron, over the last few days barely able to get out of bed went to urgent care and was told to come to ED.  Pt denies any fever, chills, cp, palpitations, n/v/d/c no travels or sick contacts.  Pt not aware of any kidney problems and never dx'd w/copd or had breathing problems in past.   Admitted with multifocal Pneumonia and Renal Insuffiencey    PAST MEDICAL & SURGICAL HISTORY:  Acquired hypothyroidism  Diabetes  No significant past surgical history    FAMILY HISTORY:  denies    SOCIAL HISTORY: BMI (kg/m2): 24 . Smoked 52 years, quit 1 month ago.    Allergies  No Known Allergies    MEDICATIONS  (STANDING):  ALBUTerol/ipratropium for Nebulization 3 milliLiter(s) Nebulizer every 6 hours  azithromycin  IVPB 500 milliGRAM(s) IV Intermittent every 24 hours  benzonatate 100 milliGRAM(s) Oral every 8 hours  cefTRIAXone   IVPB 1 Gram(s) IV Intermittent every 24 hours  dextrose 5%. 1000 milliLiter(s) (50 mL/Hr) IV Continuous <Continuous>  dextrose 50% Injectable 12.5 Gram(s) IV Push once  dextrose 50% Injectable 25 Gram(s) IV Push once  dextrose 50% Injectable 25 Gram(s) IV Push once  gabapentin 300 milliGRAM(s) Oral daily  heparin  Injectable 5000 Unit(s) SubCutaneous every 12 hours  insulin lispro (HumaLOG) corrective regimen sliding scale   SubCutaneous three times a day before meals  levothyroxine 25 MICROGram(s) Oral daily  sodium chloride 0.9%. 1000 milliLiter(s) (100 mL/Hr) IV Continuous <Continuous>    MEDICATIONS  (PRN):  dextrose 40% Gel 15 Gram(s) Oral once PRN Blood Glucose LESS THAN 70 milliGRAM(s)/deciliter  glucagon  Injectable 1 milliGRAM(s) IntraMuscular once PRN Glucose LESS THAN 70 milligrams/deciliter  HYDROcodone/homatropine Syrup 5 milliLiter(s) Oral every 8 hours PRN Cough  morphine  - Injectable 2 milliGRAM(s) IV Push every 6 hours PRN Severe Pain (7 - 10)  oxyCODONE    5 mG/acetaminophen 325 mG 1 Tablet(s) Oral every 4 hours PRN Moderate Pain (4 - 6)    REVIEW OF SYSTEMS:    Constitutional:            No fever, weight loss , + fatigue  HEENT:                       No difficulty hearing, tinnitus, vertigo; No sinus or throat pain  Respiratory:                 sob and cough.  Cardiovascular:           No chest pain, palpitations  Gastrointestinal:        No abdominal or epigastric pain. No N/V/diarrhea or hematemesis  Genitourinary:            No dysuria, frequency, hematuria or incontinence  SKIN:                             no rash  Musculoskeletal:        No joint pain or swelling  Extremities:                No swelling  Neurological:              No headaches  Psychiatric:                 No depression, anxiety    MACRA & MIPS:  Vaccines - Influenza: yes and Pneumovax: yes  BMI:    normal  Tobacco:  ex smoker.  Blood Pressure Screening / Control of:  158/71  Current Medications Reviewed:  yes    Vital Signs Last 24 Hrs  T(C): 37.1 (24 Feb 2019 17:08), Max: 37.7 (24 Feb 2019 05:11)  T(F): 98.8 (24 Feb 2019 17:08), Max: 99.9 (24 Feb 2019 05:11)  HR: 92 (24 Feb 2019 17:08) (78 - 92)  BP: 139/74 (24 Feb 2019 17:08) (115/62 - 158/71)  BP(mean): --  RR: 17 (24 Feb 2019 17:08) (16 - 18)  SpO2: 100% (24 Feb 2019 17:08) (91% - 100%)    PHYSICAL EXAM:  GEN:         Awake, responsive and comfortable.  HEENT:    Normal.    RESP:       no wheezing.  CVS:         Regular rate and rhythm.   ABD:         Soft, non-tender, non-distended;   :             No costovertebral angle tenderness  SKIN:           Warm and dry.    LABS:                        10.1   20.93 )-----------( 354      ( 24 Feb 2019 00:22 )             30.3     02-24    136  |  103  |  29<H>  ----------------------------<  130<H>  3.7   |  22  |  1.71<H>    Ca    8.0<L>      24 Feb 2019 00:22  Mg     2.2     02-23    TPro  8.9<H>  /  Alb  2.8<L>  /  TBili  0.6  /  DBili  x   /  AST  30  /  ALT  29  /  AlkPhos  81  02-23    PT/INR - ( 23 Feb 2019 20:00 )   PT: 13.2 sec;   INR: 1.17 ratio      PTT - ( 23 Feb 2019 20:00 )  PTT:31.2 sec  02-23 @ 18:58  pH: 7.39  pCO2: 36  pO2: 71  SaO2: 94    EKG: sinus rhythm    RADIOLOGY & ADDITIONAL STUDIES:  < from: CT Chest No Cont (02.24.19 @ 14:36) >    EXAM:  CT CHEST                          PROCEDURE DATE:  02/24/2019      INTERPRETATION:  Clinical Information: Dyspnea.    Comparison: None available    Procedure: Noncontrast CT of the chest with axial, sagittal, coronal, and   axial MIP reconstructions.    Findings:     Airways, pleura, lungs: Airways are clear. Pleura unremarkable. Large   multifocal tree in bud opacification representing pneumonia. Left lower   lobe small consolidation representing pneumonia.    Vasculature: Unremarkable  Mediastinum and valentina: Heart, pericardium, mediastinal fat, and esophagus   unremarkable.  Chest wall and lower neck: Thyroid, lymph nodes, and breast tissue   unremarkable.  Imaged upper abdomen: Cholecystectomy.  Musculoskeletal: Degenerative changes.    Impression: Extensive multifocal pneumonia detailed above.    ANDREW MONTEZ M.D., ATTENDING RADIOLOGIST  This document has been electronically signed. Feb 24 2019  3:30PM      ASSESSMENT AND PLAN:  ·	SOB.  ·	Multifocal Pneumonia, CAP.  ·	Leukocytosis.  ·	Suspect COPD.  ·	Tobacco Abuse( quit 1 month ago).  ·	Renal Insuffiencey  ·	Anemia.  ·	HTN.  ·	DM with Neuropathy.  ·	Spinal stenosis.    Continue antibiotics and nebulizer.  PFT out pt.  Repeat chest CT in 6-8 weeks.

## 2019-02-24 NOTE — PROGRESS NOTE ADULT - ASSESSMENT
67 y/o former smoker w/hypothyroid, dm2 and peripheral neuropathy  presents with  pna/breonchitis  and renal insufficiency ?MICHELINE.

## 2019-02-25 LAB
ALBUMIN SERPL ELPH-MCNC: 2.3 G/DL — LOW (ref 3.3–5)
ALP SERPL-CCNC: 58 U/L — SIGNIFICANT CHANGE UP (ref 40–120)
ALT FLD-CCNC: 27 U/L — SIGNIFICANT CHANGE UP (ref 12–78)
ANION GAP SERPL CALC-SCNC: 11 MMOL/L — SIGNIFICANT CHANGE UP (ref 5–17)
AST SERPL-CCNC: 31 U/L — SIGNIFICANT CHANGE UP (ref 15–37)
BILIRUB SERPL-MCNC: 0.5 MG/DL — SIGNIFICANT CHANGE UP (ref 0.2–1.2)
BUN SERPL-MCNC: 13 MG/DL — SIGNIFICANT CHANGE UP (ref 7–23)
CALCIUM SERPL-MCNC: 7.8 MG/DL — LOW (ref 8.5–10.1)
CHLORIDE SERPL-SCNC: 103 MMOL/L — SIGNIFICANT CHANGE UP (ref 96–108)
CO2 SERPL-SCNC: 26 MMOL/L — SIGNIFICANT CHANGE UP (ref 22–31)
CREAT SERPL-MCNC: 0.8 MG/DL — SIGNIFICANT CHANGE UP (ref 0.5–1.3)
GLUCOSE BLDC GLUCOMTR-MCNC: 179 MG/DL — HIGH (ref 70–99)
GLUCOSE SERPL-MCNC: 144 MG/DL — HIGH (ref 70–99)
HCT VFR BLD CALC: 29 % — LOW (ref 34.5–45)
HCV RNA FLD QL NAA+PROBE: SIGNIFICANT CHANGE UP
HCV RNA SPEC QL PROBE+SIG AMP: DETECTED
HGB BLD-MCNC: 9.5 G/DL — LOW (ref 11.5–15.5)
MAGNESIUM SERPL-MCNC: 1.3 MG/DL — LOW (ref 1.6–2.6)
MCHC RBC-ENTMCNC: 30.6 PG — SIGNIFICANT CHANGE UP (ref 27–34)
MCHC RBC-ENTMCNC: 32.8 GM/DL — SIGNIFICANT CHANGE UP (ref 32–36)
MCV RBC AUTO: 93.5 FL — SIGNIFICANT CHANGE UP (ref 80–100)
NRBC # BLD: 0 /100 WBCS — SIGNIFICANT CHANGE UP (ref 0–0)
PHOSPHATE SERPL-MCNC: 3.1 MG/DL — SIGNIFICANT CHANGE UP (ref 2.5–4.5)
PLATELET # BLD AUTO: 334 K/UL — SIGNIFICANT CHANGE UP (ref 150–400)
POTASSIUM SERPL-MCNC: 3.7 MMOL/L — SIGNIFICANT CHANGE UP (ref 3.5–5.3)
POTASSIUM SERPL-SCNC: 3.7 MMOL/L — SIGNIFICANT CHANGE UP (ref 3.5–5.3)
PROCALCITONIN SERPL-MCNC: 0.46 NG/ML — HIGH (ref 0.02–0.1)
PROT SERPL-MCNC: 7.1 GM/DL — SIGNIFICANT CHANGE UP (ref 6–8.3)
RBC # BLD: 3.1 M/UL — LOW (ref 3.8–5.2)
RBC # FLD: 12.3 % — SIGNIFICANT CHANGE UP (ref 10.3–14.5)
SODIUM SERPL-SCNC: 140 MMOL/L — SIGNIFICANT CHANGE UP (ref 135–145)
T3 SERPL-MCNC: 79 NG/DL — LOW (ref 80–200)
T4 AB SER-ACNC: 9.9 UG/DL — SIGNIFICANT CHANGE UP (ref 4.6–12)
T4 FREE SERPL-MCNC: 1.7 NG/DL — SIGNIFICANT CHANGE UP (ref 0.9–1.8)
TSH SERPL-MCNC: 0.11 UIU/ML — LOW (ref 0.36–3.74)
WBC # BLD: 13.36 K/UL — HIGH (ref 3.8–10.5)
WBC # FLD AUTO: 13.36 K/UL — HIGH (ref 3.8–10.5)

## 2019-02-25 PROCEDURE — 93306 TTE W/DOPPLER COMPLETE: CPT | Mod: 26

## 2019-02-25 PROCEDURE — 99233 SBSQ HOSP IP/OBS HIGH 50: CPT

## 2019-02-25 PROCEDURE — 76700 US EXAM ABDOM COMPLETE: CPT | Mod: 26

## 2019-02-25 PROCEDURE — 99223 1ST HOSP IP/OBS HIGH 75: CPT

## 2019-02-25 RX ORDER — POLYETHYLENE GLYCOL 3350 17 G/17G
17 POWDER, FOR SOLUTION ORAL DAILY
Qty: 0 | Refills: 0 | Status: DISCONTINUED | OUTPATIENT
Start: 2019-02-25 | End: 2019-02-26

## 2019-02-25 RX ADMIN — Medication 3 MILLILITER(S): at 06:24

## 2019-02-25 RX ADMIN — Medication 2: at 12:12

## 2019-02-25 RX ADMIN — Medication 3 MILLILITER(S): at 23:24

## 2019-02-25 RX ADMIN — SODIUM CHLORIDE 100 MILLILITER(S): 9 INJECTION INTRAMUSCULAR; INTRAVENOUS; SUBCUTANEOUS at 18:29

## 2019-02-25 RX ADMIN — MORPHINE SULFATE 2 MILLIGRAM(S): 50 CAPSULE, EXTENDED RELEASE ORAL at 22:46

## 2019-02-25 RX ADMIN — Medication 100 MILLIGRAM(S): at 06:41

## 2019-02-25 RX ADMIN — Medication 3 MILLILITER(S): at 11:36

## 2019-02-25 RX ADMIN — Medication 25 MICROGRAM(S): at 06:41

## 2019-02-25 RX ADMIN — SODIUM CHLORIDE 100 MILLILITER(S): 9 INJECTION INTRAMUSCULAR; INTRAVENOUS; SUBCUTANEOUS at 22:31

## 2019-02-25 RX ADMIN — HEPARIN SODIUM 5000 UNIT(S): 5000 INJECTION INTRAVENOUS; SUBCUTANEOUS at 18:29

## 2019-02-25 RX ADMIN — Medication 3 MILLILITER(S): at 17:02

## 2019-02-25 RX ADMIN — CEFTRIAXONE 100 GRAM(S): 500 INJECTION, POWDER, FOR SOLUTION INTRAMUSCULAR; INTRAVENOUS at 17:30

## 2019-02-25 RX ADMIN — AZITHROMYCIN 255 MILLIGRAM(S): 500 TABLET, FILM COATED ORAL at 18:28

## 2019-02-25 RX ADMIN — HEPARIN SODIUM 5000 UNIT(S): 5000 INJECTION INTRAVENOUS; SUBCUTANEOUS at 06:42

## 2019-02-25 RX ADMIN — GABAPENTIN 300 MILLIGRAM(S): 400 CAPSULE ORAL at 12:12

## 2019-02-25 RX ADMIN — MORPHINE SULFATE 2 MILLIGRAM(S): 50 CAPSULE, EXTENDED RELEASE ORAL at 22:31

## 2019-02-25 RX ADMIN — Medication 100 MILLIGRAM(S): at 21:17

## 2019-02-25 RX ADMIN — Medication 2: at 07:39

## 2019-02-25 RX ADMIN — SODIUM CHLORIDE 100 MILLILITER(S): 9 INJECTION INTRAMUSCULAR; INTRAVENOUS; SUBCUTANEOUS at 06:43

## 2019-02-25 RX ADMIN — OXYCODONE AND ACETAMINOPHEN 1 TABLET(S): 5; 325 TABLET ORAL at 22:16

## 2019-02-25 RX ADMIN — OXYCODONE AND ACETAMINOPHEN 1 TABLET(S): 5; 325 TABLET ORAL at 21:16

## 2019-02-25 RX ADMIN — Medication 100 MILLIGRAM(S): at 13:57

## 2019-02-25 NOTE — PHYSICAL THERAPY INITIAL EVALUATION ADULT - GAIT DEVIATIONS NOTED, PT EVAL
increased time in double stance/decreased velocity of limb motion/decreased step length/decreased stride length/decreased todd

## 2019-02-25 NOTE — PROGRESS NOTE ADULT - SUBJECTIVE AND OBJECTIVE BOX
CC/F/U for: PNA    HPI:  Pt is a 67 y/o female w/pmhx of hypothyroid, DM2, peripheral neuropathy and smoker quit 2months ago(~20pk yr) comes in w/complain of non productive cough x 2weeks, progressive ceron, over the last few days barely able to get out of bed went to urgent care and was told to come to ed.  Pt denies any fever, chills, cp, palpitations, n/v/d/c no travels or sick contacts.  Pt not aware of any kidney problems and never dx'd w/copd or had breathign problems in past.  in ed found to have pna and renal insufficiency being admitted for further managment. (2019 22:26)        INTERVAL HPI/OVERNIGHT EVENTS:  Pt seen and examined at bedside - breathing ok.     Allergies/Intolerance: No Known Allergies      MEDICATIONS  (STANDING):  ALBUTerol/ipratropium for Nebulization 3 milliLiter(s) Nebulizer every 6 hours  azithromycin  IVPB 500 milliGRAM(s) IV Intermittent every 24 hours  benzonatate 100 milliGRAM(s) Oral every 8 hours  cefTRIAXone   IVPB 1 Gram(s) IV Intermittent every 24 hours  dextrose 5%. 1000 milliLiter(s) (50 mL/Hr) IV Continuous <Continuous>  dextrose 50% Injectable 12.5 Gram(s) IV Push once  dextrose 50% Injectable 25 Gram(s) IV Push once  dextrose 50% Injectable 25 Gram(s) IV Push once  gabapentin 300 milliGRAM(s) Oral daily  heparin  Injectable 5000 Unit(s) SubCutaneous every 12 hours  insulin lispro (HumaLOG) corrective regimen sliding scale   SubCutaneous three times a day before meals  levothyroxine 25 MICROGram(s) Oral daily  sodium chloride 0.9%. 1000 milliLiter(s) (100 mL/Hr) IV Continuous <Continuous>    MEDICATIONS  (PRN):  dextrose 40% Gel 15 Gram(s) Oral once PRN Blood Glucose LESS THAN 70 milliGRAM(s)/deciliter  glucagon  Injectable 1 milliGRAM(s) IntraMuscular once PRN Glucose LESS THAN 70 milligrams/deciliter  HYDROcodone/homatropine Syrup 5 milliLiter(s) Oral every 8 hours PRN Cough  morphine  - Injectable 2 milliGRAM(s) IV Push every 6 hours PRN Severe Pain (7 - 10)  oxyCODONE    5 mG/acetaminophen 325 mG 1 Tablet(s) Oral every 4 hours PRN Moderate Pain (4 - 6)  polyethylene glycol 3350 17 Gram(s) Oral daily PRN Constipation        ROS: as above; all other systems reviewed and wnl      PHYSICAL EXAMINATION:  Vital Signs Last 24 Hrs  T(C): 36.3 (2019 17:21), Max: 37.2 (2019 05:55)  T(F): 97.3 (2019 17:21), Max: 99 (2019 05:55)  HR: 83 (2019 18:29) (79 - 101)  BP: 165/93 (2019 17:21) (129/64 - 165/93)  RR: 18 (2019 17:21) (17 - 18)  SpO2: 96% (2019 18:29) (93% - 98%)  CAPILLARY BLOOD GLUCOSE      POCT Blood Glucose.: 133 mg/dL (2019 16:29)  POCT Blood Glucose.: 199 mg/dL (2019 11:44)  POCT Blood Glucose.: 179 mg/dL (2019 07:38)  POCT Blood Glucose.: 230 mg/dL (2019 22:22)      GENERAL: middle-aged female, NAD  HEAD:  atraumatic, normocephalic  EYES: sclera anicteric  ENMT: mucous membranes dry  NECK: supple, No JVD  CHEST/LUNG: respirations unlabored; air entry symmetric; scattered wheezing  HEART: normal S1, S2  ABDOMEN: BS+, soft, ND, NT   EXTREMITIES:  pulses palpable; no edema b/l LEs, no calf tenderness  NEURO: awake, alert, interactive; moves all extremities        LABS:                        9.5    13.36 )-----------( 334      ( 2019 07:05 )             29.0     02-25    140  |  103  |  13  ----------------------------<  144<H>  3.7   |  26  |  0.80    Ca    7.8<L>      2019 07:05  Phos  3.1     02-25  Mg     1.3     02-25    TPro  7.1  /  Alb  2.3<L>  /  TBili  0.5  /  DBili  x   /  AST  31  /  ALT  27  /  AlkPhos  58  02-25    PT/INR - ( 2019 20:00 )   PT: 13.2 sec;   INR: 1.17 ratio         PTT - ( 2019 20:00 )  PTT:31.2 sec  Urinalysis Basic - ( 2019 17:28 )    Color: Yellow / Appearance: Clear / S.010 / pH: x  Gluc: x / Ketone: Negative  / Bili: Negative / Urobili: Negative mg/dL   Blood: x / Protein: 15 mg/dL / Nitrite: Negative   Leuk Esterase: Negative / RBC: 0-2 /HPF / WBC 0-2   Sq Epi: x / Non Sq Epi: x / Bacteria: Few        ASSESSMENT AND PLAN:  68F, DM2/neuropathy, hypothy, tobacco use; adm w/cough, sob, leukocytosis, lactate 5.1, elev Cr 2.16, CXR neg, CT chest w/extensive multilobar PNA, also found to be HCV+, abd u/s w/fatty liver    PNA  -IV abxs: ceftriaxone, azithromycin  -f/u cxs: blood cxs neg, UA neg  -r/o'd viral syndrome: flu neg, RSV neg  -continue pulm regimen of nebs, suppl O2  -leukocytosis - downtrending - trend w/daily labs  -check resting and ambulatory O2 sats on room air b/f d/c to determine need for home oxygen    MICHELINE - Cr normalized - likely 2/2 volume depletion in setting of acute infection     HCV+, fatty liver - abd u/s shows no hepatic lesions, LFTs nl - will need GI f/u for tx    DM - glycemic cvrg w/insulin  hypothy - continue synthroid  dvt prophy - heparin sc

## 2019-02-25 NOTE — PROGRESS NOTE ADULT - SUBJECTIVE AND OBJECTIVE BOX
INTERVAL HPI:  69 y/o female with Hypothyroid, DM2, HTN, Peripheral Neuropathy, Spinal stenosis and smoker for 52 years( quit 2months ago )   Cames in w/complain of non productive cough x 2weeks, progressive ceron, over the last few days barely able to get out of bed went to urgent care and was told to come to ED.  Pt denies any fever, chills, cp, palpitations, n/v/d/c no travels or sick contacts.  Pt not aware of any kidney problems and never dx'd w/copd or had breathing problems in past.   Admitted with multifocal Pneumonia and Renal Insuffiencey    OVERNIGHT EVENTS:  Reports sob and cough.    Vital Signs Last 24 Hrs  T(C): 36.3 (2019 17:21), Max: 37.2 (2019 05:55)  T(F): 97.3 (2019 17:21), Max: 99 (2019 05:55)  HR: 83 (2019 18:29) (79 - 101)  BP: 165/93 (2019 17:21) (129/64 - 165/93)  BP(mean): --  RR: 18 (2019 17:21) (17 - 18)  SpO2: 96% (2019 18:29) (93% - 98%)    PHYSICAL EXAM:  GEN:         Awake, responsive and comfortable.  HEENT:    Normal.    RESP:        crackles.  CVS:          Regular rate and rhythm.   ABD:         Soft, non-tender, non-distended;     MEDICATIONS  (STANDING):  ALBUTerol/ipratropium for Nebulization 3 milliLiter(s) Nebulizer every 6 hours  azithromycin  IVPB 500 milliGRAM(s) IV Intermittent every 24 hours  benzonatate 100 milliGRAM(s) Oral every 8 hours  cefTRIAXone   IVPB 1 Gram(s) IV Intermittent every 24 hours  dextrose 5%. 1000 milliLiter(s) (50 mL/Hr) IV Continuous <Continuous>  dextrose 50% Injectable 12.5 Gram(s) IV Push once  dextrose 50% Injectable 25 Gram(s) IV Push once  dextrose 50% Injectable 25 Gram(s) IV Push once  gabapentin 300 milliGRAM(s) Oral daily  heparin  Injectable 5000 Unit(s) SubCutaneous every 12 hours  insulin lispro (HumaLOG) corrective regimen sliding scale   SubCutaneous three times a day before meals  levothyroxine 25 MICROGram(s) Oral daily  sodium chloride 0.9%. 1000 milliLiter(s) (100 mL/Hr) IV Continuous <Continuous>    MEDICATIONS  (PRN):  dextrose 40% Gel 15 Gram(s) Oral once PRN Blood Glucose LESS THAN 70 milliGRAM(s)/deciliter  glucagon  Injectable 1 milliGRAM(s) IntraMuscular once PRN Glucose LESS THAN 70 milligrams/deciliter  HYDROcodone/homatropine Syrup 5 milliLiter(s) Oral every 8 hours PRN Cough  morphine  - Injectable 2 milliGRAM(s) IV Push every 6 hours PRN Severe Pain (7 - 10)  oxyCODONE    5 mG/acetaminophen 325 mG 1 Tablet(s) Oral every 4 hours PRN Moderate Pain (4 - 6)  polyethylene glycol 3350 17 Gram(s) Oral daily PRN Constipation    LABS:                        9.5    13.36 )-----------( 334      ( 2019 07:05 )             29.0         140  |  103  |  13  ----------------------------<  144<H>  3.7   |  26  |  0.80    Ca    7.8<L>      2019 07:05  Phos  3.1       Mg     1.3         TPro  7.1  /  Alb  2.3<L>  /  TBili  0.5  /  DBili  x   /  AST  31  /  ALT  27  /  AlkPhos  58   @ 18:58  pH: 7.39  pCO2: 36  pO2: 71  SaO2: 94    Urinalysis Basic - ( 2019 17:28 )    Color: Yellow / Appearance: Clear / S.010 / pH: x  Gluc: x / Ketone: Negative  / Bili: Negative / Urobili: Negative mg/dL   Blood: x / Protein: 15 mg/dL / Nitrite: Negative   Leuk Esterase: Negative / RBC: 0-2 /HPF / WBC 0-2   Sq Epi: x / Non Sq Epi: x / Bacteria: Few    ASSESSMENT AND PLAN:  ·	SOB.  ·	Multifocal Pneumonia, CAP.  ·	Leukocytosis.  ·	Suspect COPD.  ·	Tobacco Abuse( quit 1 month ago).  ·	Renal Insuffiencey  ·	Anemia.  ·	HTN.  ·	DM with Neuropathy.  ·	Spinal stenosis.  Continue antibiotics and nebulizer.  PFT out pt.  Repeat chest CT in 6-8 weeks.

## 2019-02-25 NOTE — PHYSICAL THERAPY INITIAL EVALUATION ADULT - CRITERIA FOR SKILLED THERAPEUTIC INTERVENTIONS
therapy frequency/predicted duration of therapy intervention/risk reduction/prevention/impairments found/functional limitations in following categories/home with outpatient PT/anticipated discharge recommendation

## 2019-02-26 ENCOUNTER — TRANSCRIPTION ENCOUNTER (OUTPATIENT)
Age: 69
End: 2019-02-26

## 2019-02-26 VITALS
DIASTOLIC BLOOD PRESSURE: 96 MMHG | OXYGEN SATURATION: 94 % | HEART RATE: 84 BPM | RESPIRATION RATE: 18 BRPM | TEMPERATURE: 99 F | SYSTOLIC BLOOD PRESSURE: 158 MMHG

## 2019-02-26 DIAGNOSIS — J15.9 UNSPECIFIED BACTERIAL PNEUMONIA: ICD-10-CM

## 2019-02-26 LAB
HCT VFR BLD CALC: 29.5 % — LOW (ref 34.5–45)
HGB BLD-MCNC: 9.6 G/DL — LOW (ref 11.5–15.5)
MCHC RBC-ENTMCNC: 30 PG — SIGNIFICANT CHANGE UP (ref 27–34)
MCHC RBC-ENTMCNC: 32.5 GM/DL — SIGNIFICANT CHANGE UP (ref 32–36)
MCV RBC AUTO: 92.2 FL — SIGNIFICANT CHANGE UP (ref 80–100)
NRBC # BLD: 0 /100 WBCS — SIGNIFICANT CHANGE UP (ref 0–0)
PLATELET # BLD AUTO: 359 K/UL — SIGNIFICANT CHANGE UP (ref 150–400)
RBC # BLD: 3.2 M/UL — LOW (ref 3.8–5.2)
RBC # FLD: 12 % — SIGNIFICANT CHANGE UP (ref 10.3–14.5)
WBC # BLD: 14.56 K/UL — HIGH (ref 3.8–10.5)
WBC # FLD AUTO: 14.56 K/UL — HIGH (ref 3.8–10.5)

## 2019-02-26 PROCEDURE — 99239 HOSP IP/OBS DSCHRG MGMT >30: CPT

## 2019-02-26 RX ORDER — LEVOFLOXACIN 5 MG/ML
1 INJECTION, SOLUTION INTRAVENOUS
Qty: 7 | Refills: 0
Start: 2019-02-26 | End: 2019-03-04

## 2019-02-26 RX ORDER — HYDROCODONE BITARTRATE AND HOMATROPINE METHYLBROMIDE 5; 1.5 MG/5ML; MG/5ML
5 SOLUTION ORAL
Qty: 45 | Refills: 0
Start: 2019-02-26 | End: 2019-02-28

## 2019-02-26 RX ADMIN — HEPARIN SODIUM 5000 UNIT(S): 5000 INJECTION INTRAVENOUS; SUBCUTANEOUS at 05:36

## 2019-02-26 RX ADMIN — Medication 2: at 11:08

## 2019-02-26 RX ADMIN — Medication 100 MILLIGRAM(S): at 05:36

## 2019-02-26 RX ADMIN — Medication 25 MICROGRAM(S): at 05:35

## 2019-02-26 RX ADMIN — Medication 3 MILLILITER(S): at 12:05

## 2019-02-26 RX ADMIN — Medication 2: at 07:44

## 2019-02-26 RX ADMIN — Medication 3 MILLILITER(S): at 05:46

## 2019-02-26 NOTE — DISCHARGE NOTE NURSING/CASE MANAGEMENT/SOCIAL WORK - NSDCDPATPORTLINK_GEN_ALL_CORE
You can access the [facility] Patient Portal, offered by [facility], by registering with the following website: [email or web address]

## 2019-02-26 NOTE — CONSULT NOTE ADULT - SUBJECTIVE AND OBJECTIVE BOX
Infectious Diseases - Attending at Dr. Nuñez    HPI:  Pt is a 67 y/o female w/pmhx of hypothyroid, DM2, peripheral neuropathy and smoker quit 2months ago(~20pk yr) comes in w/complain of non productive cough x 2weeks, progressive ceron, over the last few days barely able to get out of bed went to urgent care and was told to come to ed.  Pt denies any fever, chills, cp, palpitations, n/v/d/c no travels or sick contacts.  Pt not aware of any kidney problems and never dx'd w/copd or had breathign problems in past.  in ed found to have pna and renal insufficiency being admitted for further management (2019 22:26)     PAST MEDICAL & SURGICAL HISTORY:   Acquired hypothyroidism  Diabetes  No significant past surgical history      Allergies    No Known Allergies    Intolerances        FAMILY HISTORY: non contributory       SOCIAL HISTORY: non smoker    REVIEW OF SYSTEMS:     Constitutional: No fever, weight loss or fatigue  Eyes: No eye pain, visual disturbances, or discharge  ENT:  No difficulty hearing, tinnitus, vertigo; No sinus or throat pain  Neck: No pain or stiffness  Respiratory:+ cough,+ wheezing, No hemoptysis  Cardiovascular: No chest pain, palpitations,+ shortness of breath, No  dizziness or leg swelling  Gastrointestinal: No abdominal or epigastric pain. No nausea, vomiting or hematemesis; No diarrhea or constipation. No melena or hematochezia.  Genitourinary: No dysuria, frequency, hematuria or incontinence  Neurological: No headaches, memory loss, loss of strength, numbness or tremors  Skin: No itching, burning, rashes or lesions       MEDICATIONS  (STANDING):  ALBUTerol/ipratropium for Nebulization 3 milliLiter(s) Nebulizer every 6 hours  azithromycin  IVPB 500 milliGRAM(s) IV Intermittent every 24 hours  benzonatate 100 milliGRAM(s) Oral every 8 hours  cefTRIAXone   IVPB 1 Gram(s) IV Intermittent every 24 hours  dextrose 5%. 1000 milliLiter(s) (50 mL/Hr) IV Continuous <Continuous>  dextrose 50% Injectable 12.5 Gram(s) IV Push once  dextrose 50% Injectable 25 Gram(s) IV Push once  dextrose 50% Injectable 25 Gram(s) IV Push once  gabapentin 300 milliGRAM(s) Oral daily  heparin  Injectable 5000 Unit(s) SubCutaneous every 12 hours  insulin lispro (HumaLOG) corrective regimen sliding scale   SubCutaneous three times a day before meals  levothyroxine 25 MICROGram(s) Oral daily  sodium chloride 0.9%. 1000 milliLiter(s) (100 mL/Hr) IV Continuous <Continuous>    MEDICATIONS  (PRN):  dextrose 40% Gel 15 Gram(s) Oral once PRN Blood Glucose LESS THAN 70 milliGRAM(s)/deciliter  glucagon  Injectable 1 milliGRAM(s) IntraMuscular once PRN Glucose LESS THAN 70 milligrams/deciliter  HYDROcodone/homatropine Syrup 5 milliLiter(s) Oral every 8 hours PRN Cough  morphine  - Injectable 2 milliGRAM(s) IV Push every 6 hours PRN Severe Pain (7 - 10)  oxyCODONE    5 mG/acetaminophen 325 mG 1 Tablet(s) Oral every 4 hours PRN Moderate Pain (4 - 6)  polyethylene glycol 3350 17 Gram(s) Oral daily PRN Constipation      Vital Signs Last 24 Hrs  Tmax = afebrile    PHYSICAL EXAM:    Constitutional: NAD, well-groomed, well-developed  HEENT: PERRLA, EOMI, Normal Hearing, MMM  Neck: No LAD, No JVD  Back: Normal spine flexure, No CVA tenderness  Respiratory: CTAB/L  Cardiovascular: S1 and S2, RRR, no M/G/R  Gastrointestinal: BS+, soft, NT/ND  Extremities: No peripheral edema  Vascular: 2+ peripheral pulses  Neurological: A/O x 3, no focal deficits  Skin: No rashes      LABS:                        9.5    13.36 )-----------( 334      ( 2019 07:05 )             29.0     -    140  |  103  |  13  ----------------------------<  144<H>  3.7   |  26  |  0.80    Ca    7.8<L>      2019 07:05  Phos  3.1     02-25  Mg     1.3     -    TPro  7.1  /  Alb  2.3<L>  /  TBili  0.5  /  DBili  x   /  AST  31  /  ALT  27  /  AlkPhos  58  02-25    PT/INR - ( 2019 20:00 )   PT: 13.2 sec;   INR: 1.17 ratio         PTT - ( 2019 20:00 )  PTT:31.2 sec      Urinalysis Basic - ( 2019 17:28 )    Color: Yellow / Appearance: Clear / S.010 / pH: x  Gluc: x / Ketone: Negative  / Bili: Negative / Urobili: Negative mg/dL   Blood: x / Protein: 15 mg/dL / Nitrite: Negative   Leuk Esterase: Negative / RBC: 0-2 /HPF / WBC 0-2   Sq Epi: x / Non Sq Epi: x / Bacteria: Few        MICROBIOLOGY:  RECENT CULTURES:   .Blood XXXX XXXX   No growth to date.          RADIOLOGY & ADDITIONAL STUDIES:    INTERPRETATION:  Clinical Information: Pneumonia    Technique: AP chest image.     Comparison: 2011    Findings: The heart is unremarkable. The lungs are clear. Bones are   unremarkable for age.    Impression: Clear lungs.

## 2019-02-26 NOTE — DISCHARGE NOTE PROVIDER - HOSPITAL COURSE
67 y/o former smoker w/hypothyroid, dm2 and peripheral neuropathy  presents with  pna/breonchitis  and renal insufficiency ?MICHELINE.         Problem/Plan - 1:    ·  Problem: CAP (community acquired pneumonia).  Plan: blood cultures    can switch to po abx and f/u as outpt         Problem/Plan - 2:    ·  Problem: Sepsis, due to unspecified organism.  Plan: Present on admission as demonstrated by Leukocytosis, Lactic Acidosis.         Problem/Plan - 3:    ·  Problem: Type 2 diabetes mellitus with diabetic polyneuropathy, without long-term current use of insulin.  Plan: home meds        Problem/Plan - 4:    ·  Problem: Acquired hypothyroidism.  Plan: cont synthroid    f/u outpt        Problem/Plan - 5:    ·  Problem: Neuropathy.  Plan: cont neurontin.         Problem/Plan - 6:    Problem: MICHELINE (acute kidney injury). Plan:resolved         Problem/Plan - 7:    ·  Problem: Hep C w/o coma, chronic.  Plan: outpt f/u     45 min spent on dc planning

## 2019-02-26 NOTE — DISCHARGE NOTE PROVIDER - CARE PROVIDER_API CALL
your pcp,   Phone: (   )    -  Fax: (   )    -  Follow Up Time:     Nohemi John)  Medicine  [address] [facility] [facility] [address], [address]  [place] [address]  Phone: [phone]  Fax: [phone]  Follow Up Time:

## 2019-02-26 NOTE — DISCHARGE NOTE PROVIDER - NSDCCPCAREPLAN_GEN_ALL_CORE_FT
PRINCIPAL DISCHARGE DIAGNOSIS  Problem: Bronchitis  Assessment and Plan of Treatment:       SECONDARY DISCHARGE DIAGNOSES  Problem: CAP (community acquired pneumonia)  Assessment and Plan of Treatment:

## 2019-03-01 DIAGNOSIS — J18.9 PNEUMONIA, UNSPECIFIED ORGANISM: ICD-10-CM

## 2019-03-01 DIAGNOSIS — N17.9 ACUTE KIDNEY FAILURE, UNSPECIFIED: ICD-10-CM

## 2019-03-01 DIAGNOSIS — D64.9 ANEMIA, UNSPECIFIED: ICD-10-CM

## 2019-03-01 DIAGNOSIS — E11.42 TYPE 2 DIABETES MELLITUS WITH DIABETIC POLYNEUROPATHY: ICD-10-CM

## 2019-03-01 DIAGNOSIS — E03.8 OTHER SPECIFIED HYPOTHYROIDISM: ICD-10-CM

## 2019-03-01 DIAGNOSIS — K76.0 FATTY (CHANGE OF) LIVER, NOT ELSEWHERE CLASSIFIED: ICD-10-CM

## 2019-03-01 DIAGNOSIS — Z87.891 PERSONAL HISTORY OF NICOTINE DEPENDENCE: ICD-10-CM

## 2019-03-01 DIAGNOSIS — R06.02 SHORTNESS OF BREATH: ICD-10-CM

## 2019-03-01 DIAGNOSIS — A41.9 SEPSIS, UNSPECIFIED ORGANISM: ICD-10-CM

## 2019-03-01 DIAGNOSIS — J40 BRONCHITIS, NOT SPECIFIED AS ACUTE OR CHRONIC: ICD-10-CM

## 2019-03-01 DIAGNOSIS — B18.2 CHRONIC VIRAL HEPATITIS C: ICD-10-CM

## 2019-03-01 LAB
CULTURE RESULTS: SIGNIFICANT CHANGE UP
CULTURE RESULTS: SIGNIFICANT CHANGE UP
SPECIMEN SOURCE: SIGNIFICANT CHANGE UP
SPECIMEN SOURCE: SIGNIFICANT CHANGE UP

## 2019-03-25 PROBLEM — E03.9 HYPOTHYROIDISM, UNSPECIFIED: Chronic | Status: ACTIVE | Noted: 2019-02-23

## 2019-03-25 PROBLEM — E11.9 TYPE 2 DIABETES MELLITUS WITHOUT COMPLICATIONS: Chronic | Status: ACTIVE | Noted: 2019-02-23

## 2019-04-02 ENCOUNTER — APPOINTMENT (OUTPATIENT)
Dept: ANESTHESIOLOGY | Facility: CLINIC | Age: 69
End: 2019-04-02

## 2019-04-02 ENCOUNTER — OUTPATIENT (OUTPATIENT)
Dept: OUTPATIENT SERVICES | Facility: HOSPITAL | Age: 69
LOS: 1 days | End: 2019-04-02
Payer: COMMERCIAL

## 2019-04-02 DIAGNOSIS — M54.16 RADICULOPATHY, LUMBAR REGION: ICD-10-CM

## 2019-04-02 PROCEDURE — 62323 NJX INTERLAMINAR LMBR/SAC: CPT

## 2019-04-05 DIAGNOSIS — M54.17 RADICULOPATHY, LUMBOSACRAL REGION: ICD-10-CM

## 2019-09-09 NOTE — DISCHARGE NOTE NURSING/CASE MANAGEMENT/SOCIAL WORK - NSTRANSFEREYEGLASSESPAIRS_GEN_A_NUR
LETITIA --    [facility] home health physical therapist called to notify us that the patient was being discharged from services today  Thank you! 1 pair

## 2020-08-07 ENCOUNTER — OUTPATIENT (OUTPATIENT)
Dept: OUTPATIENT SERVICES | Facility: HOSPITAL | Age: 70
LOS: 1 days | End: 2020-08-07
Payer: MEDICARE

## 2020-08-07 ENCOUNTER — APPOINTMENT (OUTPATIENT)
Dept: ANESTHESIOLOGY | Facility: CLINIC | Age: 70
End: 2020-08-07

## 2020-08-07 DIAGNOSIS — M54.16 RADICULOPATHY, LUMBAR REGION: ICD-10-CM

## 2020-08-07 PROCEDURE — 62323 NJX INTERLAMINAR LMBR/SAC: CPT

## 2020-08-10 DIAGNOSIS — M54.17 RADICULOPATHY, LUMBOSACRAL REGION: ICD-10-CM

## 2020-08-21 NOTE — ED ADULT TRIAGE NOTE - ARRIVAL FROM
To whom it may concern,    Please excuse her absence from work 08/20/2020 through 08/21/2020 as she was present with daughter who is under my care      Sincerely,    Shira Ruggiero PA-C urgent care

## 2021-03-09 ENCOUNTER — OUTPATIENT (OUTPATIENT)
Dept: OUTPATIENT SERVICES | Facility: HOSPITAL | Age: 71
LOS: 1 days | End: 2021-03-09
Payer: MEDICARE

## 2021-03-09 ENCOUNTER — APPOINTMENT (OUTPATIENT)
Dept: ANESTHESIOLOGY | Facility: CLINIC | Age: 71
End: 2021-03-09

## 2021-03-09 DIAGNOSIS — M54.16 RADICULOPATHY, LUMBAR REGION: ICD-10-CM

## 2021-03-09 PROCEDURE — 62323 NJX INTERLAMINAR LMBR/SAC: CPT

## 2022-07-07 NOTE — ED ADULT NURSE NOTE - CAS EDN INTEG ASSESS
[facility] Colchicine Pregnancy And Lactation Text: This medication is Pregnancy Category C and isn't considered safe during pregnancy. It is excreted in breast milk.

## 2023-02-03 ENCOUNTER — EMERGENCY (EMERGENCY)
Facility: HOSPITAL | Age: 73
LOS: 0 days | Discharge: HOME | End: 2023-02-04
Attending: EMERGENCY MEDICINE
Payer: MEDICARE

## 2023-02-03 VITALS
RESPIRATION RATE: 18 BRPM | HEART RATE: 88 BPM | DIASTOLIC BLOOD PRESSURE: 110 MMHG | TEMPERATURE: 99 F | OXYGEN SATURATION: 99 % | WEIGHT: 149.91 LBS | HEIGHT: 65 IN | SYSTOLIC BLOOD PRESSURE: 192 MMHG

## 2023-02-03 DIAGNOSIS — Z79.84 LONG TERM (CURRENT) USE OF ORAL HYPOGLYCEMIC DRUGS: ICD-10-CM

## 2023-02-03 DIAGNOSIS — R20.0 ANESTHESIA OF SKIN: ICD-10-CM

## 2023-02-03 DIAGNOSIS — E11.42 TYPE 2 DIABETES MELLITUS WITH DIABETIC POLYNEUROPATHY: ICD-10-CM

## 2023-02-03 LAB
ALBUMIN SERPL ELPH-MCNC: 3.5 G/DL — SIGNIFICANT CHANGE UP (ref 3.3–5)
ALP SERPL-CCNC: 77 U/L — SIGNIFICANT CHANGE UP (ref 40–120)
ALT FLD-CCNC: 44 U/L — SIGNIFICANT CHANGE UP (ref 12–78)
ANION GAP SERPL CALC-SCNC: 7 MMOL/L — SIGNIFICANT CHANGE UP (ref 5–17)
AST SERPL-CCNC: 33 U/L — SIGNIFICANT CHANGE UP (ref 15–37)
BASOPHILS # BLD AUTO: 0.03 K/UL — SIGNIFICANT CHANGE UP (ref 0–0.2)
BASOPHILS NFR BLD AUTO: 0.3 % — SIGNIFICANT CHANGE UP (ref 0–2)
BILIRUB SERPL-MCNC: 0.4 MG/DL — SIGNIFICANT CHANGE UP (ref 0.2–1.2)
BUN SERPL-MCNC: 18 MG/DL — SIGNIFICANT CHANGE UP (ref 7–23)
CALCIUM SERPL-MCNC: 9 MG/DL — SIGNIFICANT CHANGE UP (ref 8.5–10.1)
CHLORIDE SERPL-SCNC: 102 MMOL/L — SIGNIFICANT CHANGE UP (ref 96–108)
CO2 SERPL-SCNC: 24 MMOL/L — SIGNIFICANT CHANGE UP (ref 22–31)
CREAT SERPL-MCNC: 1.33 MG/DL — HIGH (ref 0.5–1.3)
EGFR: 43 ML/MIN/1.73M2 — LOW
EOSINOPHIL # BLD AUTO: 0.07 K/UL — SIGNIFICANT CHANGE UP (ref 0–0.5)
EOSINOPHIL NFR BLD AUTO: 0.7 % — SIGNIFICANT CHANGE UP (ref 0–6)
GLUCOSE BLDC GLUCOMTR-MCNC: 97 MG/DL — SIGNIFICANT CHANGE UP (ref 70–99)
GLUCOSE SERPL-MCNC: 108 MG/DL — HIGH (ref 70–99)
HCT VFR BLD CALC: 35.6 % — SIGNIFICANT CHANGE UP (ref 34.5–45)
HGB BLD-MCNC: 12.1 G/DL — SIGNIFICANT CHANGE UP (ref 11.5–15.5)
IMM GRANULOCYTES NFR BLD AUTO: 0.6 % — SIGNIFICANT CHANGE UP (ref 0–0.9)
LYMPHOCYTES # BLD AUTO: 2.22 K/UL — SIGNIFICANT CHANGE UP (ref 1–3.3)
LYMPHOCYTES # BLD AUTO: 22.7 % — SIGNIFICANT CHANGE UP (ref 13–44)
MAGNESIUM SERPL-MCNC: 1.8 MG/DL — SIGNIFICANT CHANGE UP (ref 1.6–2.6)
MCHC RBC-ENTMCNC: 31.7 PG — SIGNIFICANT CHANGE UP (ref 27–34)
MCHC RBC-ENTMCNC: 34 G/DL — SIGNIFICANT CHANGE UP (ref 32–36)
MCV RBC AUTO: 93.2 FL — SIGNIFICANT CHANGE UP (ref 80–100)
MONOCYTES # BLD AUTO: 0.56 K/UL — SIGNIFICANT CHANGE UP (ref 0–0.9)
MONOCYTES NFR BLD AUTO: 5.7 % — SIGNIFICANT CHANGE UP (ref 2–14)
NEUTROPHILS # BLD AUTO: 6.86 K/UL — SIGNIFICANT CHANGE UP (ref 1.8–7.4)
NEUTROPHILS NFR BLD AUTO: 70 % — SIGNIFICANT CHANGE UP (ref 43–77)
NRBC # BLD: 0 /100 WBCS — SIGNIFICANT CHANGE UP (ref 0–0)
PLATELET # BLD AUTO: 248 K/UL — SIGNIFICANT CHANGE UP (ref 150–400)
POTASSIUM SERPL-MCNC: 4 MMOL/L — SIGNIFICANT CHANGE UP (ref 3.5–5.3)
POTASSIUM SERPL-SCNC: 4 MMOL/L — SIGNIFICANT CHANGE UP (ref 3.5–5.3)
PROT SERPL-MCNC: 7.5 GM/DL — SIGNIFICANT CHANGE UP (ref 6–8.3)
RBC # BLD: 3.82 M/UL — SIGNIFICANT CHANGE UP (ref 3.8–5.2)
RBC # FLD: 12.1 % — SIGNIFICANT CHANGE UP (ref 10.3–14.5)
SODIUM SERPL-SCNC: 133 MMOL/L — LOW (ref 135–145)
WBC # BLD: 9.8 K/UL — SIGNIFICANT CHANGE UP (ref 3.8–10.5)
WBC # FLD AUTO: 9.8 K/UL — SIGNIFICANT CHANGE UP (ref 3.8–10.5)

## 2023-02-03 PROCEDURE — 99284 EMERGENCY DEPT VISIT MOD MDM: CPT

## 2023-02-03 PROCEDURE — 71250 CT THORAX DX C-: CPT | Mod: 26,MA

## 2023-02-03 RX ORDER — ACETAMINOPHEN 500 MG
975 TABLET ORAL ONCE
Refills: 0 | Status: COMPLETED | OUTPATIENT
Start: 2023-02-03 | End: 2023-02-03

## 2023-02-03 RX ADMIN — Medication 975 MILLIGRAM(S): at 22:58

## 2023-02-03 RX ADMIN — Medication 975 MILLIGRAM(S): at 22:15

## 2023-02-03 NOTE — ED ADULT NURSE NOTE - CHIEF COMPLAINT QUOTE
Patient aox3. patient with c/o b/l feet neuropathy (worse than usual) x 1 hour. denies fall. Patient with hx of neuropathy, HTN, DM, hypothyroid

## 2023-02-03 NOTE — ED ADULT NURSE NOTE - OBJECTIVE STATEMENT
Pt is alert and oriented x4, endorsing numbness b/l feet, pt fell onto right side attempting to get off toilet. Denies head trauma, LOC, SOB, dizziness. PMH of peripheral neuropathy, HTN, DM. Pack a day cigarette smoker. No facial droop noted, moving all extremities, speech is clear.

## 2023-02-03 NOTE — ED PROVIDER NOTE - CLINICAL SUMMARY MEDICAL DECISION MAKING FREE TEXT BOX
pt pw difficulty getting off the toilet. likely nerve compression exacerbating known peripheral neuropathy. will check lytes. will ct chest to make sure no rib fx. pt back to her baseline. pt pw difficulty getting off the toilet. likely nerve compression exacerbating known peripheral neuropathy. will check lytes. will ct chest to make sure no rib fx. pt back to her baseline. ct chest neg for trauma. pt feeling well, ok for [place] home

## 2023-02-03 NOTE — ED PROVIDER NOTE - PATIENT PORTAL LINK FT
You can access the FollowMyHealth Patient Portal offered by [facility] by registering at the following website: [email or web address]. By joining [employer]’s FollowMyHealth portal, you will also be able to view your health information using other applications (apps) compatible with our system.

## 2023-02-03 NOTE — ED PROVIDER NOTE - OBJECTIVE STATEMENT
72F hx dm, htn and peripheral neuropathy pw exacerbation of peripheral neuropathy. pt was on the toilet today and felt her legs go numb. she could get off it. she called  to help her but he couldn't really lift her and she fell onto right side. didn't hit head, no loc. notes her legs are back to their baseline and she is ambulating at her baseline again (with walker). no slurred speech, cp, sob, vomiting, fever, focal weakness. on gabapentin

## 2023-02-03 NOTE — ED ADULT TRIAGE NOTE - CHIEF COMPLAINT QUOTE
Patient aox3. patient with c/o b/l neuropathy x 1 hour. denies fall. Patient with hx of neuropathy, HTN, DM, hypothyroid Patient aox3. patient with c/o b/l feet neuropathy (worsen than usual) x 1 hour. denies fall. Patient with hx of neuropathy, HTN, DM, hypothyroid Patient aox3. patient with c/o b/l feet neuropathy (worse than usual) x 1 hour. denies fall. Patient with hx of neuropathy, HTN, DM, hypothyroid

## 2023-02-03 NOTE — ED PROVIDER NOTE - PHYSICAL EXAMINATION
Gen: Alert, NAD  Head: NC, AT   Eyes: PERRL, EOMI, normal lids/conjunctiva  ENT: normal hearing, patent oropharynx without erythema/exudate, uvula midline  Neck: supple, no tenderness, Trachea midline  Pulm: Bilateral BS, normal resp effort, no wheeze/stridor/retractions  CV: RRR, no M/R/G, 2+ radial and dp pulses bl, no edema  Abd: soft, NT/ND, +BS, no hepatosplenomegaly  Mskel: extremities x4 with normal ROM and no joint effusions. no ctl spine ttp.   Skin: no rash, no bruising   Neuro: AAOx3, slightly decreased sensation bl le. motor deficits, CN 2-12 intact

## 2023-02-04 VITALS
DIASTOLIC BLOOD PRESSURE: 91 MMHG | SYSTOLIC BLOOD PRESSURE: 181 MMHG | RESPIRATION RATE: 16 BRPM | OXYGEN SATURATION: 95 % | HEART RATE: 75 BPM | TEMPERATURE: 98 F

## 2023-02-04 RX ADMIN — Medication 0.2 MILLIGRAM(S): at 00:43

## 2023-02-04 NOTE — ED ADULT NURSE REASSESSMENT NOTE - NS ED NURSE REASSESS COMMENT FT1
Pt to be discharged as per provider. Alert & oriented x4, in no acute distress, VSS. No IV in place. Discharge instructions provided and pt verbalized understanding. Left ED in wheelchair with .

## 2023-12-01 NOTE — ED PROVIDER NOTE - NS ED MD DISPO ISOLATION TYPES
Contact/Droplet Assistance OOB with selected safe patient handling equipment if applicable/Assistance with ambulation/Communicate risk of Fall with Harm to all staff, patient, and family/Monitor gait and stability/Monitor for mental status changes and reorient to person, place, and time, as needed/Provide visual cue: red socks, yellow wristband, yellow gown, etc/Reinforce activity limits and safety measures with patient and family/Toileting schedule using arm’s reach rule for commode and bathroom/Use of alarms - bed, stretcher, chair and/or video monitoring/Bed in lowest position, wheels locked, appropriate side rails in place/Call bell, personal items and telephone in reach/Instruct patient to call for assistance before getting out of bed/chair/stretcher/Non-slip footwear applied when patient is off stretcher/[place] to call system/Physically safe environment - no spills, clutter or unnecessary equipment/Purposeful Proactive Rounding/Room/bathroom lighting operational, light cord in reach

## 2024-02-17 ENCOUNTER — INPATIENT (INPATIENT)
Facility: HOSPITAL | Age: 74
LOS: 4 days | Discharge: NURSING, REHAB OR LONG-TERM CARE | End: 2024-02-22
Attending: INTERNAL MEDICINE | Admitting: INTERNAL MEDICINE
Payer: MEDICARE

## 2024-02-17 VITALS
RESPIRATION RATE: 18 BRPM | SYSTOLIC BLOOD PRESSURE: 148 MMHG | WEIGHT: 134.92 LBS | TEMPERATURE: 99 F | HEIGHT: 64 IN | OXYGEN SATURATION: 98 % | HEART RATE: 110 BPM | DIASTOLIC BLOOD PRESSURE: 86 MMHG

## 2024-02-17 DIAGNOSIS — R53.1 WEAKNESS: ICD-10-CM

## 2024-02-17 LAB
ALBUMIN SERPL ELPH-MCNC: 2.6 G/DL — LOW (ref 3.3–5)
ALP SERPL-CCNC: 63 U/L — SIGNIFICANT CHANGE UP (ref 40–120)
ALT FLD-CCNC: 82 U/L — HIGH (ref 12–78)
ANION GAP SERPL CALC-SCNC: 12 MMOL/L — SIGNIFICANT CHANGE UP (ref 5–17)
APPEARANCE UR: ABNORMAL
APTT BLD: 30.1 SEC — SIGNIFICANT CHANGE UP (ref 24.5–35.6)
AST SERPL-CCNC: 84 U/L — HIGH (ref 15–37)
BACTERIA # UR AUTO: ABNORMAL /HPF
BASOPHILS # BLD AUTO: 0.08 K/UL — SIGNIFICANT CHANGE UP (ref 0–0.2)
BASOPHILS NFR BLD AUTO: 0.4 % — SIGNIFICANT CHANGE UP (ref 0–2)
BILIRUB SERPL-MCNC: 1.3 MG/DL — HIGH (ref 0.2–1.2)
BILIRUB UR-MCNC: ABNORMAL
BUN SERPL-MCNC: 66 MG/DL — HIGH (ref 7–23)
CALCIUM SERPL-MCNC: 9.2 MG/DL — SIGNIFICANT CHANGE UP (ref 8.5–10.1)
CHLORIDE SERPL-SCNC: 101 MMOL/L — SIGNIFICANT CHANGE UP (ref 96–108)
CO2 SERPL-SCNC: 21 MMOL/L — LOW (ref 22–31)
COLOR SPEC: SIGNIFICANT CHANGE UP
CREAT SERPL-MCNC: 2.2 MG/DL — HIGH (ref 0.5–1.3)
DIFF PNL FLD: ABNORMAL
EGFR: 23 ML/MIN/1.73M2 — LOW
EOSINOPHIL # BLD AUTO: 0 K/UL — SIGNIFICANT CHANGE UP (ref 0–0.5)
EOSINOPHIL NFR BLD AUTO: 0 % — SIGNIFICANT CHANGE UP (ref 0–6)
EPI CELLS # UR: PRESENT
GLUCOSE BLDC GLUCOMTR-MCNC: 118 MG/DL — HIGH (ref 70–99)
GLUCOSE BLDC GLUCOMTR-MCNC: 149 MG/DL — HIGH (ref 70–99)
GLUCOSE BLDC GLUCOMTR-MCNC: 208 MG/DL — HIGH (ref 70–99)
GLUCOSE SERPL-MCNC: 222 MG/DL — HIGH (ref 70–99)
GLUCOSE UR QL: NEGATIVE MG/DL — SIGNIFICANT CHANGE UP
HCT VFR BLD CALC: 31.3 % — LOW (ref 34.5–45)
HGB BLD-MCNC: 11 G/DL — LOW (ref 11.5–15.5)
IMM GRANULOCYTES NFR BLD AUTO: 0.6 % — SIGNIFICANT CHANGE UP (ref 0–0.9)
INR BLD: 0.99 RATIO — SIGNIFICANT CHANGE UP (ref 0.85–1.18)
KETONES UR-MCNC: NEGATIVE MG/DL — SIGNIFICANT CHANGE UP
LACTATE SERPL-SCNC: 1.8 MMOL/L — SIGNIFICANT CHANGE UP (ref 0.7–2)
LEUKOCYTE ESTERASE UR-ACNC: ABNORMAL
LYMPHOCYTES # BLD AUTO: 0.5 K/UL — LOW (ref 1–3.3)
LYMPHOCYTES # BLD AUTO: 2.6 % — LOW (ref 13–44)
MCHC RBC-ENTMCNC: 30.6 PG — SIGNIFICANT CHANGE UP (ref 27–34)
MCHC RBC-ENTMCNC: 35.1 G/DL — SIGNIFICANT CHANGE UP (ref 32–36)
MCV RBC AUTO: 86.9 FL — SIGNIFICANT CHANGE UP (ref 80–100)
MONOCYTES # BLD AUTO: 1.7 K/UL — HIGH (ref 0–0.9)
MONOCYTES NFR BLD AUTO: 9 % — SIGNIFICANT CHANGE UP (ref 2–14)
NEUTROPHILS # BLD AUTO: 16.53 K/UL — HIGH (ref 1.8–7.4)
NEUTROPHILS NFR BLD AUTO: 87.4 % — HIGH (ref 43–77)
NITRITE UR-MCNC: NEGATIVE — SIGNIFICANT CHANGE UP
NRBC # BLD: 0 /100 WBCS — SIGNIFICANT CHANGE UP (ref 0–0)
PH UR: 5 — SIGNIFICANT CHANGE UP (ref 5–8)
PLATELET # BLD AUTO: 248 K/UL — SIGNIFICANT CHANGE UP (ref 150–400)
POTASSIUM SERPL-MCNC: 3.8 MMOL/L — SIGNIFICANT CHANGE UP (ref 3.5–5.3)
POTASSIUM SERPL-SCNC: 3.8 MMOL/L — SIGNIFICANT CHANGE UP (ref 3.5–5.3)
PROT SERPL-MCNC: 7.8 GM/DL — SIGNIFICANT CHANGE UP (ref 6–8.3)
PROT UR-MCNC: 30 MG/DL
PROTHROM AB SERPL-ACNC: 11.9 SEC — SIGNIFICANT CHANGE UP (ref 9.5–13)
RAPID RVP RESULT: SIGNIFICANT CHANGE UP
RBC # BLD: 3.6 M/UL — LOW (ref 3.8–5.2)
RBC # FLD: 12.5 % — SIGNIFICANT CHANGE UP (ref 10.3–14.5)
RBC CASTS # UR COMP ASSIST: SIGNIFICANT CHANGE UP /HPF (ref 0–4)
SARS-COV-2 RNA SPEC QL NAA+PROBE: SIGNIFICANT CHANGE UP
SODIUM SERPL-SCNC: 134 MMOL/L — LOW (ref 135–145)
SP GR SPEC: 1.01 — SIGNIFICANT CHANGE UP (ref 1–1.03)
UROBILINOGEN FLD QL: 0.2 MG/DL — SIGNIFICANT CHANGE UP (ref 0.2–1)
WBC # BLD: 18.92 K/UL — HIGH (ref 3.8–10.5)
WBC # FLD AUTO: 18.92 K/UL — HIGH (ref 3.8–10.5)
WBC UR QL: SIGNIFICANT CHANGE UP /HPF (ref 0–5)

## 2024-02-17 PROCEDURE — 70450 CT HEAD/BRAIN W/O DYE: CPT | Mod: 26,MA

## 2024-02-17 PROCEDURE — 93010 ELECTROCARDIOGRAM REPORT: CPT

## 2024-02-17 PROCEDURE — 71045 X-RAY EXAM CHEST 1 VIEW: CPT | Mod: 26

## 2024-02-17 PROCEDURE — 99285 EMERGENCY DEPT VISIT HI MDM: CPT

## 2024-02-17 PROCEDURE — 99223 1ST HOSP IP/OBS HIGH 75: CPT

## 2024-02-17 RX ORDER — INSULIN GLARGINE 100 [IU]/ML
15 INJECTION, SOLUTION SUBCUTANEOUS AT BEDTIME
Refills: 0 | Status: DISCONTINUED | OUTPATIENT
Start: 2024-02-17 | End: 2024-02-22

## 2024-02-17 RX ORDER — SODIUM CHLORIDE 9 MG/ML
800 INJECTION INTRAMUSCULAR; INTRAVENOUS; SUBCUTANEOUS ONCE
Refills: 0 | Status: COMPLETED | OUTPATIENT
Start: 2024-02-17 | End: 2024-02-17

## 2024-02-17 RX ORDER — ACETAMINOPHEN 500 MG
1000 TABLET ORAL ONCE
Refills: 0 | Status: COMPLETED | OUTPATIENT
Start: 2024-02-17 | End: 2024-02-17

## 2024-02-17 RX ORDER — CEFTRIAXONE 500 MG/1
1000 INJECTION, POWDER, FOR SOLUTION INTRAMUSCULAR; INTRAVENOUS EVERY 24 HOURS
Refills: 0 | Status: DISCONTINUED | OUTPATIENT
Start: 2024-02-17 | End: 2024-02-22

## 2024-02-17 RX ORDER — SODIUM CHLORIDE 9 MG/ML
1000 INJECTION, SOLUTION INTRAVENOUS
Refills: 0 | Status: DISCONTINUED | OUTPATIENT
Start: 2024-02-17 | End: 2024-02-22

## 2024-02-17 RX ORDER — HEPARIN SODIUM 5000 [USP'U]/ML
5000 INJECTION INTRAVENOUS; SUBCUTANEOUS EVERY 12 HOURS
Refills: 0 | Status: DISCONTINUED | OUTPATIENT
Start: 2024-02-17 | End: 2024-02-22

## 2024-02-17 RX ORDER — SODIUM CHLORIDE 9 MG/ML
1000 INJECTION INTRAMUSCULAR; INTRAVENOUS; SUBCUTANEOUS
Refills: 0 | Status: DISCONTINUED | OUTPATIENT
Start: 2024-02-17 | End: 2024-02-20

## 2024-02-17 RX ORDER — ACETAMINOPHEN 500 MG
650 TABLET ORAL EVERY 8 HOURS
Refills: 0 | Status: DISCONTINUED | OUTPATIENT
Start: 2024-02-17 | End: 2024-02-22

## 2024-02-17 RX ORDER — IBUPROFEN 200 MG
400 TABLET ORAL ONCE
Refills: 0 | Status: COMPLETED | OUTPATIENT
Start: 2024-02-17 | End: 2024-02-17

## 2024-02-17 RX ORDER — INSULIN LISPRO 100/ML
VIAL (ML) SUBCUTANEOUS
Refills: 0 | Status: DISCONTINUED | OUTPATIENT
Start: 2024-02-17 | End: 2024-02-22

## 2024-02-17 RX ORDER — IBUPROFEN 200 MG
600 TABLET ORAL ONCE
Refills: 0 | Status: COMPLETED | OUTPATIENT
Start: 2024-02-17 | End: 2024-02-17

## 2024-02-17 RX ORDER — DEXTROSE 50 % IN WATER 50 %
12.5 SYRINGE (ML) INTRAVENOUS ONCE
Refills: 0 | Status: DISCONTINUED | OUTPATIENT
Start: 2024-02-17 | End: 2024-02-22

## 2024-02-17 RX ORDER — DEXTROSE 50 % IN WATER 50 %
25 SYRINGE (ML) INTRAVENOUS ONCE
Refills: 0 | Status: DISCONTINUED | OUTPATIENT
Start: 2024-02-17 | End: 2024-02-22

## 2024-02-17 RX ORDER — DEXTROSE 50 % IN WATER 50 %
15 SYRINGE (ML) INTRAVENOUS ONCE
Refills: 0 | Status: DISCONTINUED | OUTPATIENT
Start: 2024-02-17 | End: 2024-02-22

## 2024-02-17 RX ORDER — SODIUM CHLORIDE 9 MG/ML
1000 INJECTION INTRAMUSCULAR; INTRAVENOUS; SUBCUTANEOUS ONCE
Refills: 0 | Status: COMPLETED | OUTPATIENT
Start: 2024-02-17 | End: 2024-02-17

## 2024-02-17 RX ORDER — GLUCAGON INJECTION, SOLUTION 0.5 MG/.1ML
1 INJECTION, SOLUTION SUBCUTANEOUS ONCE
Refills: 0 | Status: DISCONTINUED | OUTPATIENT
Start: 2024-02-17 | End: 2024-02-22

## 2024-02-17 RX ORDER — CEFTRIAXONE 500 MG/1
1000 INJECTION, POWDER, FOR SOLUTION INTRAMUSCULAR; INTRAVENOUS ONCE
Refills: 0 | Status: COMPLETED | OUTPATIENT
Start: 2024-02-17 | End: 2024-02-17

## 2024-02-17 RX ADMIN — Medication 600 MILLIGRAM(S): at 14:31

## 2024-02-17 RX ADMIN — Medication 600 MILLIGRAM(S): at 14:01

## 2024-02-17 RX ADMIN — CEFTRIAXONE 1000 MILLIGRAM(S): 500 INJECTION, POWDER, FOR SOLUTION INTRAMUSCULAR; INTRAVENOUS at 17:26

## 2024-02-17 RX ADMIN — Medication 30 MILLILITER(S): at 14:01

## 2024-02-17 RX ADMIN — Medication 4: at 18:36

## 2024-02-17 RX ADMIN — SODIUM CHLORIDE 1000 MILLILITER(S): 9 INJECTION INTRAMUSCULAR; INTRAVENOUS; SUBCUTANEOUS at 14:50

## 2024-02-17 RX ADMIN — SODIUM CHLORIDE 1000 MILLILITER(S): 9 INJECTION INTRAMUSCULAR; INTRAVENOUS; SUBCUTANEOUS at 13:49

## 2024-02-17 RX ADMIN — SODIUM CHLORIDE 800 MILLILITER(S): 9 INJECTION INTRAMUSCULAR; INTRAVENOUS; SUBCUTANEOUS at 16:54

## 2024-02-17 RX ADMIN — CEFTRIAXONE 100 MILLIGRAM(S): 500 INJECTION, POWDER, FOR SOLUTION INTRAMUSCULAR; INTRAVENOUS at 16:53

## 2024-02-17 RX ADMIN — INSULIN GLARGINE 15 UNIT(S): 100 INJECTION, SOLUTION SUBCUTANEOUS at 22:26

## 2024-02-17 RX ADMIN — Medication 400 MILLIGRAM(S): at 23:24

## 2024-02-17 NOTE — ED ADULT NURSE NOTE - OBJECTIVE STATEMENT
Pt BIBA ems from home, AOx3, responsive, ambulatory w RW at baseline. Pt c/o generalized weakness x 1 week, states she fell while ambulating at home "couple of days ago", denies LOC but positive head trauma. Denies CP/SOB/fever/chills, n/v/d/  symptoms, headache/dizziness. 102.4F rectal noted. ecg completed. placed on cardiac monitor, tolerating RA on continuous pulse ox. h/o fall , HDL, HTN, hypothyroid. NKA.

## 2024-02-17 NOTE — ED PROVIDER NOTE - CLINICAL SUMMARY MEDICAL DECISION MAKING FREE TEXT BOX
73-year-old female with history of hypertension,diabetes and acquired hypothyroidism presents today brought in by ambulance from home for evaluation for generalized weakness.  Patient states that her  called EMS today, she admits to being generally weak x 1 week, associated with decreased p.o. intake.  She denies chest pain, shortness of breath, nausea/vomiting, diarrhea, flulike symptoms, abdominal pain, or urinary symptoms.  On exam patient is awake alert and oriented x 3 nontoxic-appearing, slightly weak appearing, with dry lips.  Skin is warm to touch otherwise exam is benign.  Differential diagnosis includes but not limited to viral illness, pneumonia, UTI.  Patient found to have a rectal temperature of 101.8 in the ER sepsis labs ordered, IV fluid hydration, ibuprofen for fever.  Will reassess and dispo 73-year-old female with history of hypertension,diabetes and acquired hypothyroidism presents today brought in by ambulance from home for evaluation for generalized weakness.  Patient states that her  called EMS today, she admits to being generally weak x 1 week, associated with decreased p.o. intake.  She denies chest pain, shortness of breath, nausea/vomiting, diarrhea, flulike symptoms, abdominal pain, or urinary symptoms.  On exam patient is awake alert and oriented x 3 nontoxic-appearing, slightly weak appearing, with dry lips.  Skin is warm to touch otherwise exam is benign.  Differential diagnosis includes but not limited to viral illness, pneumonia, UTI.  Patient found to have a rectal temperature of 101.8 in the ER sepsis labs ordered, IV fluid hydration, ibuprofen for fever.  Will reassess and dispo    labs reviewed, pt noted to have a wbc-18, ua positive for uti, creatinine increased from baseline, likely due to dehydration\  will admit for hydration and iv antibiotics

## 2024-02-17 NOTE — H&P ADULT - NSHPLABSRESULTS_GEN_ALL_CORE
LABS:                        11.0   18.92 )-----------( 248      ( 2024 13:55 )             31.3     02-17    134<L>  |  101  |  66<H>  ----------------------------<  222<H>  3.8   |  21<L>  |  2.20<H>    Ca    9.2      2024 13:55    TPro  7.8  /  Alb  2.6<L>  /  TBili  1.3<H>  /  DBili  x   /  AST  84<H>  /  ALT  82<H>  /  AlkPhos  63  02-17    PT/INR - ( 2024 13:55 )   PT: 11.9 sec;   INR: 0.99 ratio         PTT - ( 2024 13:55 )  PTT:30.1 sec  Urinalysis Basic - ( 2024 15:24 )    Color: Dark Yellow / Appearance: Slightly Cloudy / S.015 / pH: x  Gluc: x / Ketone: Negative mg/dL  / Bili: Small / Urobili: 0.2 mg/dL   Blood: x / Protein: 30 mg/dL / Nitrite: Negative   Leuk Esterase: Large / RBC: x / WBC x   Sq Epi: x / Non Sq Epi: x / Bacteria: x          RADIOLOGY & ADDITIONAL TESTS:

## 2024-02-17 NOTE — ED ADULT NURSE NOTE - NSICDXNOPASTSURGICALHX_GEN_ALL_CORE
Pt has an episode bleeding around vaginal area. <-- Click to add NO significant Past Surgical History

## 2024-02-17 NOTE — ED ADULT TRIAGE NOTE - HEIGHT IN CM
Ms. Jus Chatterjee was here today. INR today:   Results for orders placed or performed in visit on 10/04/18   POCT INR   Result Value Ref Range    INR 2.4     Protime     INR Goal: 2.0-3.0    Dosing Plan  As of 10/4/2018    TTR:   66.8 % (1.3 y)   Full warfarin instructions:   5 mg on Mon, Wed, Fri; 7.5 mg all other days            PLAN: CONTINUE CURRENT DOSE  NEXT COUMADIN CLINIC APT IS: 10/18/2018 at 3:45am    Coumadin Clinic Hours  Tuesday 7:30am - 4:00pm  Wednesday 7:30am - 4:00pm  Thursday 7:30am - 4:00pm  IF IT'S AN EMERGENCY, PLEASE CALL 911 OR GO TO YOUR NEAREST EMERGENCY ROOM. [facility] COUMADIN CLINIC [phone]  IF UNABLE TO REACH COUMADIN CLINIC, [address] [place] [address], [phone]. [phone]

## 2024-02-17 NOTE — ED ADULT NURSE NOTE - NSFALLHARMRISKINTERV_ED_ALL_ED

## 2024-02-17 NOTE — ED PROVIDER NOTE - IV ALTEPLASE ADMIN OUTSIDE HIDDEN
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 Admitted for toxic metabolic encephalopathy/sepsis.  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   Patient was attempted to be seen and examined by neuro ACP, however, patient refusing to be examined and refusing to talk to provider. States that he is tired and wants to be left alone.     T(C): 36.8 (10-13-23 @ 13:51), Max: 37.9 (10-12-23 @ 20:25)  HR: 89 (10-13-23 @ 13:51) (68 - 89)  BP: 153/56 (10-13-23 @ 13:51) (88/52 - 153/56)  RR: 18 (10-13-23 @ 13:51) (18 - 18)  SpO2: 96% (10-13-23 @ 13:51) (95% - 96%)    PAST MEDICAL & SURGICAL HISTORY:  HTN (hypertension)      COPD (chronic obstructive pulmonary disease)      High cholesterol      Mild anemia      Coffee ground emesis      Chronic diastolic heart failure      PAULA (acute kidney injury)      No significant past surgical history          FAMILY HISTORY:  No pertinent family history in first degree relatives        SOCIAL HISTORY:  Denies smoking, drinking, or drug use    ROS: ***  Constitutional: No fever, weight loss or fatigue  Eyes: No eye pain, visual disturbances, or discharge  ENMT:  No difficulty hearing, tinnitus, vertigo; No sinus or throat pain  Neck: No pain or stiffness  Respiratory: No cough, wheezing, chills or hemoptysis  Cardiovascular: No chest pain, palpitations, shortness of breath, dizziness or leg swelling  Gastrointestinal: No abdominal pain. No nausea, vomiting or hematemesis; No diarrhea or constipation. Nohematochezia.  Genitourinary: No dysuria, frequency, hematuria or incontinence  Neurological: As per HPI  Skin: No itching, burning, rashes or lesions   Endocrine: No heat or cold intolerance; No hair loss  Musculoskeletal: No joint pain or swelling; No muscle, back or extremity pain  Psychiatric: No depression, anxiety, mood swings or difficulty sleeping  Heme/Lymph: No easy bruising or bleeding gums    MEDICATIONS  (STANDING):  budesonide 160 MICROgram(s)/formoterol 4.5 MICROgram(s) Inhaler 2 Puff(s) Inhalation two times a day  chlorhexidine 2% Cloths 1 Application(s) Topical <User Schedule>  collagenase Ointment 1 Application(s) Topical two times a day  darbepoetin Injectable Syringe 25 MICROGram(s) IV Push <User Schedule>  dextrose 5%. 1000 milliLiter(s) (100 mL/Hr) IV Continuous <Continuous>  dextrose 50% Injectable 12.5 Gram(s) IV Push once  dextrose 50% Injectable 25 Gram(s) IV Push once  dextrose 50% Injectable 25 Gram(s) IV Push once  glucagon  Injectable 1 milliGRAM(s) IntraMuscular once  heparin   Injectable 5000 Unit(s) SubCutaneous every 8 hours  insulin lispro (ADMELOG) corrective regimen sliding scale   SubCutaneous three times a day before meals  iron sucrose IVPB 100 milliGRAM(s) IV Intermittent <User Schedule>  midodrine. 10 milliGRAM(s) Oral every 8 hours  pantoprazole   Suspension 40 milliGRAM(s) Oral two times a day  piperacillin/tazobactam IVPB.. 3.375 Gram(s) IV Intermittent every 12 hours  polyethylene glycol 3350 17 Gram(s) Oral every 12 hours  senna 2 Tablet(s) Oral at bedtime  sevelamer carbonate Powder 800 milliGRAM(s) Oral three times a day with meals  sevelamer carbonate Powder 800 milliGRAM(s) Enteral Tube three times a day with meals    MEDICATIONS  (PRN):  acetaminophen     Tablet .. 650 milliGRAM(s) Oral every 6 hours PRN Temp greater or equal to 38.5C (101.3F), Mild Pain (1 - 3), Moderate Pain (4 - 6)  albuterol    90 MICROgram(s) HFA Inhaler 1 Puff(s) Inhalation every 6 hours PRN for shortness of breath and/or wheezing  atropine Injectable 1 milliGRAM(s) IntraMuscular once PRN HR < 30  dextrose Oral Gel 15 Gram(s) Oral once PRN Blood Glucose LESS THAN 70 milliGRAM(s)/deciliter  HYDROmorphone  Injectable 0.25 milliGRAM(s) IV Push every 6 hours PRN Severe Pain (7 - 10)    Home Medications:  acetaminophen 325 mg oral tablet: 2 tab(s) orally every 6 hours, As needed, Mild Pain (1 - 3), Moderate Pain (4 - 6) (25 May 2019 09:25)  Albuterol (Eqv-ProAir HFA) 90 mcg/inh inhalation aerosol: 1 puff(s) inhaled every 6 hours as needed for  shortness of breath and/or wheezing (15 Sep 2023 22:56)  Breo Ellipta 100 mcg-25 mcg/inh inhalation powder: 1 puff(s) inhaled once a day (15 Sep 2023 22:56)  empagliflozin 10 mg oral tablet: 1 tab(s) orally once a day (15 Sep 2023 22:56)  Entresto 24 mg-26 mg oral tablet: 1 tab(s) orally 2 times a day (15 Sep 2023 22:57)  Lasix 20 mg oral tablet: 1 tab(s) orally once a day (15 Sep 2023 22:57)  Metoprolol Succinate ER 25 mg oral tablet, extended release: 1 tab(s) orally once a day (15 Sep 2023 22:57)  Percocet 5 mg-325 mg oral tablet: 1 tab(s) orally every 8 hours as needed for  severe pain (15 Sep 2023 22:57)  Xarelto 20 mg oral tablet: 1 tab(s) orally once a day (15 Sep 2023 22:57)      Allergies    No Known Allergies    Intolerances      Vital Signs Last 24 Hrs  T(C): 36.8 (13 Oct 2023 13:51), Max: 37.9 (12 Oct 2023 20:25)  T(F): 98.3 (13 Oct 2023 13:51), Max: 100.3 (12 Oct 2023 20:25)  HR: 89 (13 Oct 2023 13:51) (68 - 89)  BP: 153/56 (13 Oct 2023 13:51) (88/52 - 153/56)  BP(mean): --  RR: 18 (13 Oct 2023 13:51) (18 - 18)  SpO2: 96% (13 Oct 2023 13:51) (95% - 96%)    Parameters below as of 12 Oct 2023 20:25  Patient On (Oxygen Delivery Method): room air        Physical exam:  General: No acute distress, awake and alert  Refusing to be examined at this time  Neurologic:  Awake, eyes open, refusing to be examined. moving all extremities spontaneously. did not appreciate facial droop. states that does not want to talk at this time because he is tired and wants to be left alone.     Fingerstick Blood Glucose: CAPILLARY BLOOD GLUCOSE      POCT Blood Glucose.: 141 mg/dL (13 Oct 2023 16:49)    LABS:                        9.0    21.58 )-----------( 132      ( 13 Oct 2023 11:11 )             29.3     10-12    141  |  106  |  22<H>  ----------------------------<  104<H>  3.4<L>   |  25  |  1.6<H>    Ca    7.7<L>      12 Oct 2023 21:49  Mg     1.7     10-12    TPro  5.8<L>  /  Alb  2.6<L>  /  TBili  0.7  /  DBili  x   /  AST  26  /  ALT  88<H>  /  AlkPhos  78  10-12    PT/INR - ( 12 Oct 2023 16:16 )   PT: 16.40 sec;   INR: 1.42 ratio         PTT - ( 12 Oct 2023 00:40 )  PTT:34.3 sec      Urinalysis Basic - ( 12 Oct 2023 21:49 )    Color: x / Appearance: x / SG: x / pH: x  Gluc: 104 mg/dL / Ketone: x  / Bili: x / Urobili: x   Blood: x / Protein: x / Nitrite: x   Leuk Esterase: x / RBC: x / WBC x   Sq Epi: x / Non Sq Epi: x / Bacteria: x        RADIOLOGY & ADDITIONAL STUDIES:    HCT:  < from: CT Head No Cont (10.10.23 @ 16:44) >  IMPRESSION:  No intracranial hemorrhage, mass effect or midline shift.    A focal hypodensity is noted within the left subinsular region, not   previously seen potentially reflecting age-indeterminate infarct. An MRI   of the brain can be obtained for further evaluation if not clinically   contraindicated.    Mild chronic microvascular type changes.        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 Admitted for toxic metabolic encephalopathy/sepsis.  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   Patient was attempted to be seen and examined by neuro ACP, however, patient refusing to be examined and refusing to talk to provider. States that he is tired and wants to be left alone.     T(C): 36.8 (10-13-23 @ 13:51), Max: 37.9 (10-12-23 @ 20:25)  HR: 89 (10-13-23 @ 13:51) (68 - 89)  BP: 153/56 (10-13-23 @ 13:51) (88/52 - 153/56)  RR: 18 (10-13-23 @ 13:51) (18 - 18)  SpO2: 96% (10-13-23 @ 13:51) (95% - 96%)    PAST MEDICAL & SURGICAL HISTORY:  HTN (hypertension)      COPD (chronic obstructive pulmonary disease)      High cholesterol      Mild anemia      Coffee ground emesis      Chronic diastolic heart failure      PAULA (acute kidney injury)      No significant past surgical history          FAMILY HISTORY:  No pertinent family history in first degree relatives        SOCIAL HISTORY:  Denies smoking, drinking, or drug use    MEDICATIONS  (STANDING):  budesonide 160 MICROgram(s)/formoterol 4.5 MICROgram(s) Inhaler 2 Puff(s) Inhalation two times a day  chlorhexidine 2% Cloths 1 Application(s) Topical <User Schedule>  collagenase Ointment 1 Application(s) Topical two times a day  darbepoetin Injectable Syringe 25 MICROGram(s) IV Push <User Schedule>  dextrose 5%. 1000 milliLiter(s) (100 mL/Hr) IV Continuous <Continuous>  dextrose 50% Injectable 12.5 Gram(s) IV Push once  dextrose 50% Injectable 25 Gram(s) IV Push once  dextrose 50% Injectable 25 Gram(s) IV Push once  glucagon  Injectable 1 milliGRAM(s) IntraMuscular once  heparin   Injectable 5000 Unit(s) SubCutaneous every 8 hours  insulin lispro (ADMELOG) corrective regimen sliding scale   SubCutaneous three times a day before meals  iron sucrose IVPB 100 milliGRAM(s) IV Intermittent <User Schedule>  midodrine. 10 milliGRAM(s) Oral every 8 hours  pantoprazole   Suspension 40 milliGRAM(s) Oral two times a day  piperacillin/tazobactam IVPB.. 3.375 Gram(s) IV Intermittent every 12 hours  polyethylene glycol 3350 17 Gram(s) Oral every 12 hours  senna 2 Tablet(s) Oral at bedtime  sevelamer carbonate Powder 800 milliGRAM(s) Oral three times a day with meals  sevelamer carbonate Powder 800 milliGRAM(s) Enteral Tube three times a day with meals    MEDICATIONS  (PRN):  acetaminophen     Tablet .. 650 milliGRAM(s) Oral every 6 hours PRN Temp greater or equal to 38.5C (101.3F), Mild Pain (1 - 3), Moderate Pain (4 - 6)  albuterol    90 MICROgram(s) HFA Inhaler 1 Puff(s) Inhalation every 6 hours PRN for shortness of breath and/or wheezing  atropine Injectable 1 milliGRAM(s) IntraMuscular once PRN HR < 30  dextrose Oral Gel 15 Gram(s) Oral once PRN Blood Glucose LESS THAN 70 milliGRAM(s)/deciliter  HYDROmorphone  Injectable 0.25 milliGRAM(s) IV Push every 6 hours PRN Severe Pain (7 - 10)    Home Medications:  acetaminophen 325 mg oral tablet: 2 tab(s) orally every 6 hours, As needed, Mild Pain (1 - 3), Moderate Pain (4 - 6) (25 May 2019 09:25)  Albuterol (Eqv-ProAir HFA) 90 mcg/inh inhalation aerosol: 1 puff(s) inhaled every 6 hours as needed for  shortness of breath and/or wheezing (15 Sep 2023 22:56)  Breo Ellipta 100 mcg-25 mcg/inh inhalation powder: 1 puff(s) inhaled once a day (15 Sep 2023 22:56)  empagliflozin 10 mg oral tablet: 1 tab(s) orally once a day (15 Sep 2023 22:56)  Entresto 24 mg-26 mg oral tablet: 1 tab(s) orally 2 times a day (15 Sep 2023 22:57)  Lasix 20 mg oral tablet: 1 tab(s) orally once a day (15 Sep 2023 22:57)  Metoprolol Succinate ER 25 mg oral tablet, extended release: 1 tab(s) orally once a day (15 Sep 2023 22:57)  Percocet 5 mg-325 mg oral tablet: 1 tab(s) orally every 8 hours as needed for  severe pain (15 Sep 2023 22:57)  Xarelto 20 mg oral tablet: 1 tab(s) orally once a day (15 Sep 2023 22:57)      Allergies    No Known Allergies    Intolerances      Vital Signs Last 24 Hrs  T(C): 36.8 (13 Oct 2023 13:51), Max: 37.9 (12 Oct 2023 20:25)  T(F): 98.3 (13 Oct 2023 13:51), Max: 100.3 (12 Oct 2023 20:25)  HR: 89 (13 Oct 2023 13:51) (68 - 89)  BP: 153/56 (13 Oct 2023 13:51) (88/52 - 153/56)  BP(mean): --  RR: 18 (13 Oct 2023 13:51) (18 - 18)  SpO2: 96% (13 Oct 2023 13:51) (95% - 96%)    Parameters below as of 12 Oct 2023 20:25  Patient On (Oxygen Delivery Method): room air        Physical exam:  General: No acute distress, awake and alert  Refusing to be examined at this time  Neurologic:  Awake, eyes open, refusing to be examined. moving all extremities spontaneously. did not appreciate facial droop. states that does not want to talk at this time because he is tired and wants to be left alone.     Fingerstick Blood Glucose: CAPILLARY BLOOD GLUCOSE      POCT Blood Glucose.: 141 mg/dL (13 Oct 2023 16:49)    LABS:                        9.0    21.58 )-----------( 132      ( 13 Oct 2023 11:11 )             29.3     10-12    141  |  106  |  22<H>  ----------------------------<  104<H>  3.4<L>   |  25  |  1.6<H>    Ca    7.7<L>      12 Oct 2023 21:49  Mg     1.7     10-12    TPro  5.8<L>  /  Alb  2.6<L>  /  TBili  0.7  /  DBili  x   /  AST  26  /  ALT  88<H>  /  AlkPhos  78  10-12    PT/INR - ( 12 Oct 2023 16:16 )   PT: 16.40 sec;   INR: 1.42 ratio         PTT - ( 12 Oct 2023 00:40 )  PTT:34.3 sec      Urinalysis Basic - ( 12 Oct 2023 21:49 )    Color: x / Appearance: x / SG: x / pH: x  Gluc: 104 mg/dL / Ketone: x  / Bili: x / Urobili: x   Blood: x / Protein: x / Nitrite: x   Leuk Esterase: x / RBC: x / WBC x   Sq Epi: x / Non Sq Epi: x / Bacteria: x        RADIOLOGY & ADDITIONAL STUDIES:    HCT:  < from: CT Head No Cont (10.10.23 @ 16:44) >  IMPRESSION:  No intracranial hemorrhage, mass effect or midline shift.    A focal hypodensity is noted within the left subinsular region, not   previously seen potentially reflecting age-indeterminate infarct. An MRI   of the brain can be obtained for further evaluation if not clinically   contraindicated.    Mild chronic microvascular type changes.       show

## 2024-02-17 NOTE — PATIENT PROFILE ADULT - FALL HARM RISK - HARM RISK INTERVENTIONS

## 2024-02-17 NOTE — H&P ADULT - ASSESSMENT
73-year-old female PMH of hypertension, diabetes and acquired hypothyroidism presents today brought in by ambulance from home for evaluation for generalized weakness.  Patient states that her  called EMS today. She admits to being generally weak x 1 week, associated with decreased p.o. intake.  She denies chest pain, shortness of breath, nausea/vomiting, diarrhea, flulike symptoms, abdominal pain. On exam patient is awake alert and oriented x 3 nontoxic-appearing, slightly weak appearing, with dry lips.  Skin is warm to touch otherwise exam is benign.  Admitted for dehydration and possible UTI.  Patient found to have a rectal temperature of 101.8 in the ER.       Plan:   73-year-old female PMH of hypertension, diabetes and acquired hypothyroidism presents today brought in by ambulance from home for evaluation for generalized weakness.  Patient states that her  called EMS today. She admits to being generally weak x 1 week, associated with decreased p.o. intake.  She denies chest pain, shortness of breath, nausea/vomiting, diarrhea, flulike symptoms, abdominal pain. On exam patient is awake alert and oriented x 3 nontoxic-appearing, slightly weak appearing, with dry lips.  Skin is warm to touch otherwise exam is benign.  Admitted for dehydration and possible UTI.  Patient found to have a rectal temperature of 101.8 in the ER.         Plan:  Admit to medicine for dehydration, possible UTI with MICHELINE. Last creatinine over 1 year ago was normal at 1.33. Will hydrate with Normal Saline, follow lytes in AM. Added Ceftriaxone     for possible UTI, follow urine culture. UA no clear WBC noted. WBC on arrival high at 19,000. Covid negative, CXR clear.  No longer takes Neurontin. CBC in AM.     Glucose 222 on arrival, will hold Janumet in hospital, [place], added Lantus 15 units at night, A1C in AM.     Called  [facility] to confirm meds, no answer at [phone]. Pt not sure of Synthyroid dose, TSH in AM, day team will need to confirm Synthyroid dose in AM.      PT eval in AM.

## 2024-02-17 NOTE — H&P ADULT - HISTORY OF PRESENT ILLNESS
73-year-old female PMH of hypertension, diabetes and acquired hypothyroidism presents today brought in by ambulance from home for evaluation for generalized weakness.  Patient states that her  called EMS today. She admits to being generally weak x 1 week, associated with decreased p.o. intake.  She denies chest pain, shortness of breath, nausea/vomiting, diarrhea, flulike symptoms, abdominal pain. On exam patient is awake alert and oriented x 3 nontoxic-appearing, slightly weak appearing, with dry lips.  Skin is warm to touch otherwise exam is benign.  Admitted for dehydration and possible UTI.  Patient found to have a rectal temperature of 101.8 in the ER.  73-year-old female PMH of hypertension, diabetes and acquired hypothyroidism presents today brought in by ambulance from home for evaluation for generalized weakness.  Patient states that her  called EMS today. She admits to being generally weak x 1 week, associated with decreased p.o. intake.  She denies chest pain, shortness of breath, nausea/vomiting, diarrhea, flulike symptoms, abdominal pain. On exam patient is awake alert and oriented x 3 nontoxic-appearing, slightly weak appearing, with dry lips.  Skin is warm to touch otherwise exam is benign.      Admitted for dehydration and possible UTI.  Patient found to have a rectal temperature of 101.8 in the ER.

## 2024-02-17 NOTE — H&P ADULT - NSHPPHYSICALEXAM_GEN_ALL_CORE
PHYSICAL EXAMINATION:  Vital Signs Last 24 Hrs  T(C): 37.1 (17 Feb 2024 15:13), Max: 39.1 (17 Feb 2024 13:52)  T(F): 98.8 (17 Feb 2024 15:13), Max: 102.4 (17 Feb 2024 13:52)  HR: 99 (17 Feb 2024 15:13) (99 - 112)  BP: 111/56 (17 Feb 2024 15:13) (111/56 - 148/86)  BP(mean): 73 (17 Feb 2024 15:13) (73 - 73)  RR: 23 (17 Feb 2024 15:13) (18 - 27)  SpO2: 96% (17 Feb 2024 15:13) (95% - 98%)    Parameters below as of 17 Feb 2024 15:13  Patient On (Oxygen Delivery Method): room air      CAPILLARY BLOOD GLUCOSE          GENERAL: NAD,   ENMT: mucous membranes moist  NECK: supple, No JVD  CHEST/LUNG: clear to auscultation bilaterally; no rales, rhonchi, or wheezing b/l  HEART: normal S1, S2  ABDOMEN: BS+, soft, ND, NT   EXTREMITIES:  pulses palpable; no clubbing, cyanosis, or edema b/l LEs  NEURO: awake, alert, interactive; moves all extremities PHYSICAL EXAMINATION:  Vital Signs Last 24 Hrs  T(C): 37.1 (17 Feb 2024 15:13), Max: 39.1 (17 Feb 2024 13:52)  T(F): 98.8 (17 Feb 2024 15:13), Max: 102.4 (17 Feb 2024 13:52)  HR: 99 (17 Feb 2024 15:13) (99 - 112)  BP: 111/56 (17 Feb 2024 15:13) (111/56 - 148/86)  BP(mean): 73 (17 Feb 2024 15:13) (73 - 73)  RR: 23 (17 Feb 2024 15:13) (18 - 27)  SpO2: 96% (17 Feb 2024 15:13) (95% - 98%)    Parameters below as of 17 Feb 2024 15:13  Patient On (Oxygen Delivery Method): room air      CAPILLARY BLOOD GLUCOSE          GENERAL: NAD, seen in ER, comfortable, answers all questions.   ENMT: mucous membranes moist  NECK: supple, No JVD  CHEST/LUNG: clear to auscultation bilaterally; no rales, rhonchi, or wheezing b/l  HEART: normal S1, S2  ABDOMEN: BS+, soft, ND, NT   EXTREMITIES:  pulses palpable; no clubbing, cyanosis, or edema b/l LEs  NEURO: awake, alert, interactive; moves all extremities

## 2024-02-17 NOTE — ED PROVIDER NOTE - CONSTITUTIONAL, MLM
Well appearing, awake, alert, oriented to person, place, time/situation and in no apparent distress, pt is warm to touch normal...

## 2024-02-17 NOTE — PATIENT PROFILE ADULT - FLU SEASON?
Health Maintenance Due   Topic Date Due   • Hepatitis A Vaccine (2 of 2 - 2-dose series) 07/28/2016   • Annual Physical (ages 3-18)  01/27/2017   • IPV Vaccine (4 of 4 - 4-dose series) 01/27/2018   • MMR Vaccine (2 of 2 - Standard series) 01/27/2018   • Varicella Vaccine (2 of 2 - 2-dose childhood series) 01/27/2018   • DTaP/Tdap/Td Vaccine (5 - Tdap) 01/27/2021       Patient is due for the topics as listed above and wishes to proceed with them.            Yes...

## 2024-02-17 NOTE — ED ADULT NURSE NOTE - NSFALLFACTORS_ED_ALL_ED
Detail Level: Detailed Quality 130: Documentation Of Current Medications In The Medical Record: Current Medications Documented Weakness

## 2024-02-18 LAB
A1C WITH ESTIMATED AVERAGE GLUCOSE RESULT: 7.2 % — HIGH (ref 4–5.6)
ANION GAP SERPL CALC-SCNC: 7 MMOL/L — SIGNIFICANT CHANGE UP (ref 5–17)
BUN SERPL-MCNC: 60 MG/DL — HIGH (ref 7–23)
CALCIUM SERPL-MCNC: 8.4 MG/DL — LOW (ref 8.5–10.1)
CHLORIDE SERPL-SCNC: 109 MMOL/L — HIGH (ref 96–108)
CO2 SERPL-SCNC: 22 MMOL/L — SIGNIFICANT CHANGE UP (ref 22–31)
CREAT SERPL-MCNC: 2.22 MG/DL — HIGH (ref 0.5–1.3)
EGFR: 23 ML/MIN/1.73M2 — LOW
ESTIMATED AVERAGE GLUCOSE: 160 MG/DL — HIGH (ref 68–114)
GLUCOSE BLDC GLUCOMTR-MCNC: 126 MG/DL — HIGH (ref 70–99)
GLUCOSE BLDC GLUCOMTR-MCNC: 154 MG/DL — HIGH (ref 70–99)
GLUCOSE BLDC GLUCOMTR-MCNC: 162 MG/DL — HIGH (ref 70–99)
GLUCOSE BLDC GLUCOMTR-MCNC: 205 MG/DL — HIGH (ref 70–99)
GLUCOSE SERPL-MCNC: 172 MG/DL — HIGH (ref 70–99)
HCT VFR BLD CALC: 26.1 % — LOW (ref 34.5–45)
HGB BLD-MCNC: 8.9 G/DL — LOW (ref 11.5–15.5)
MCHC RBC-ENTMCNC: 30.6 PG — SIGNIFICANT CHANGE UP (ref 27–34)
MCHC RBC-ENTMCNC: 34.1 G/DL — SIGNIFICANT CHANGE UP (ref 32–36)
MCV RBC AUTO: 89.7 FL — SIGNIFICANT CHANGE UP (ref 80–100)
NRBC # BLD: 0 /100 WBCS — SIGNIFICANT CHANGE UP (ref 0–0)
PLATELET # BLD AUTO: 201 K/UL — SIGNIFICANT CHANGE UP (ref 150–400)
POTASSIUM SERPL-MCNC: 3.3 MMOL/L — LOW (ref 3.5–5.3)
POTASSIUM SERPL-SCNC: 3.3 MMOL/L — LOW (ref 3.5–5.3)
RBC # BLD: 2.91 M/UL — LOW (ref 3.8–5.2)
RBC # FLD: 12.5 % — SIGNIFICANT CHANGE UP (ref 10.3–14.5)
SODIUM SERPL-SCNC: 138 MMOL/L — SIGNIFICANT CHANGE UP (ref 135–145)
TSH SERPL-MCNC: <0.005 UIU/ML — LOW (ref 0.36–3.74)
WBC # BLD: 16.76 K/UL — HIGH (ref 3.8–10.5)
WBC # FLD AUTO: 16.76 K/UL — HIGH (ref 3.8–10.5)

## 2024-02-18 PROCEDURE — 99232 SBSQ HOSP IP/OBS MODERATE 35: CPT

## 2024-02-18 RX ORDER — POTASSIUM CHLORIDE 20 MEQ
20 PACKET (EA) ORAL ONCE
Refills: 0 | Status: COMPLETED | OUTPATIENT
Start: 2024-02-18 | End: 2024-02-18

## 2024-02-18 RX ORDER — LEVOTHYROXINE SODIUM 125 MCG
100 TABLET ORAL DAILY
Refills: 0 | Status: DISCONTINUED | OUTPATIENT
Start: 2024-02-18 | End: 2024-02-20

## 2024-02-18 RX ORDER — ATORVASTATIN CALCIUM 80 MG/1
10 TABLET, FILM COATED ORAL AT BEDTIME
Refills: 0 | Status: DISCONTINUED | OUTPATIENT
Start: 2024-02-18 | End: 2024-02-22

## 2024-02-18 RX ADMIN — CEFTRIAXONE 100 MILLIGRAM(S): 500 INJECTION, POWDER, FOR SOLUTION INTRAMUSCULAR; INTRAVENOUS at 22:18

## 2024-02-18 RX ADMIN — Medication 400 MILLIGRAM(S): at 02:17

## 2024-02-18 RX ADMIN — HEPARIN SODIUM 5000 UNIT(S): 5000 INJECTION INTRAVENOUS; SUBCUTANEOUS at 05:02

## 2024-02-18 RX ADMIN — Medication 100 MICROGRAM(S): at 14:08

## 2024-02-18 RX ADMIN — HEPARIN SODIUM 5000 UNIT(S): 5000 INJECTION INTRAVENOUS; SUBCUTANEOUS at 18:27

## 2024-02-18 RX ADMIN — Medication 2: at 08:17

## 2024-02-18 RX ADMIN — SODIUM CHLORIDE 75 MILLILITER(S): 9 INJECTION INTRAMUSCULAR; INTRAVENOUS; SUBCUTANEOUS at 08:16

## 2024-02-18 RX ADMIN — CEFTRIAXONE 100 MILLIGRAM(S): 500 INJECTION, POWDER, FOR SOLUTION INTRAMUSCULAR; INTRAVENOUS at 00:57

## 2024-02-18 RX ADMIN — ATORVASTATIN CALCIUM 10 MILLIGRAM(S): 80 TABLET, FILM COATED ORAL at 22:09

## 2024-02-18 RX ADMIN — Medication 4: at 11:49

## 2024-02-18 RX ADMIN — Medication 20 MILLIEQUIVALENT(S): at 09:02

## 2024-02-18 RX ADMIN — INSULIN GLARGINE 15 UNIT(S): 100 INJECTION, SOLUTION SUBCUTANEOUS at 22:09

## 2024-02-18 NOTE — PROGRESS NOTE ADULT - ASSESSMENT
72 yo female w/ pmhx of HTN, diabetes, acquired hypothyroidism presented to [facility] for generalized weakness. Admitted to [facility] for sepsis 2/2 UTI    INFECTIOUS DISEASE  # sepsis  # UTI  - temp 101.8, hr 90, wbc 19, source UTI  - Rocephin day # 2  - urine culture - pending x 1  - blood culture 2/17- pending x 2     ENDOCRINOLOGY  # diabetes   - lantus, RISS  - a1c     # acquired hypothyroidism  - restart synthroid    PLAN  - c/w [facility]  - trend urine culture   - a1c  - consutl ENDO  - PT   72 yo female w/ pmhx of HTN, diabetes, acquired hypothyroidism presented to [facility] for generalized weakness. Admitted to [facility] for sepsis 2/2 UTI    INFECTIOUS DISEASE  # sepsis  # UTI  - temp 101.8, hr 90, wbc 19, source UTI  - Rocephin day # 2  - urine culture - pending x 1  - blood culture 2/17- pending x 2     ENDOCRINOLOGY  # diabetes   - lantus, RISS  - a1c     # acquired hypothyroidism  - restart synthroid    PLAN  - c/w [facility]  - trend urine culture   - a1c  - consutl ENDO  - PT      - called pt's  and son on 2/18- [phone], [phone]. unable to reach. left brief voicemail w/ second number.  74 yo female w/ pmhx of HTN, diabetes, acquired hypothyroidism presented to [facility] for generalized weakness. Admitted to [facility] for sepsis 2/2 UTI    INFECTIOUS DISEASE  # sepsis  # UTI  - temp 101.8, hr 90, wbc 19, source UTI  - Rocephin day # 2  - urine culture - pending x 1  - blood culture 2/17- pending x 2     ENDOCRINOLOGY  # diabetes   - lantus, RISS  - a1c     # acquired hypothyroidism  - restart synthroid    PLAN  - c/w [facility]  - trend urine culture   - a1c  - consutl ENDO  - PT      - called pt's  and son on 2/18- [phone], [phone]. unable to reach. left brief voicemail w/ second number.     - called [phone]. unable to reach.

## 2024-02-19 LAB
-  K. PNEUMONIAE GROUP: SIGNIFICANT CHANGE UP
ALBUMIN SERPL ELPH-MCNC: 1.9 G/DL — LOW (ref 3.3–5)
ALP SERPL-CCNC: 62 U/L — SIGNIFICANT CHANGE UP (ref 40–120)
ALT FLD-CCNC: 71 U/L — SIGNIFICANT CHANGE UP (ref 12–78)
ANION GAP SERPL CALC-SCNC: 7 MMOL/L — SIGNIFICANT CHANGE UP (ref 5–17)
AST SERPL-CCNC: 83 U/L — HIGH (ref 15–37)
BILIRUB SERPL-MCNC: 0.6 MG/DL — SIGNIFICANT CHANGE UP (ref 0.2–1.2)
BUN SERPL-MCNC: 45 MG/DL — HIGH (ref 7–23)
CALCIUM SERPL-MCNC: 8.7 MG/DL — SIGNIFICANT CHANGE UP (ref 8.5–10.1)
CHLORIDE SERPL-SCNC: 112 MMOL/L — HIGH (ref 96–108)
CO2 SERPL-SCNC: 21 MMOL/L — LOW (ref 22–31)
CREAT SERPL-MCNC: 1.17 MG/DL — SIGNIFICANT CHANGE UP (ref 0.5–1.3)
EGFR: 49 ML/MIN/1.73M2 — LOW
GLUCOSE BLDC GLUCOMTR-MCNC: 105 MG/DL — HIGH (ref 70–99)
GLUCOSE BLDC GLUCOMTR-MCNC: 170 MG/DL — HIGH (ref 70–99)
GLUCOSE BLDC GLUCOMTR-MCNC: 197 MG/DL — HIGH (ref 70–99)
GLUCOSE BLDC GLUCOMTR-MCNC: 252 MG/DL — HIGH (ref 70–99)
GLUCOSE SERPL-MCNC: 116 MG/DL — HIGH (ref 70–99)
HCT VFR BLD CALC: 28.9 % — LOW (ref 34.5–45)
HGB BLD-MCNC: 9.7 G/DL — LOW (ref 11.5–15.5)
MAGNESIUM SERPL-MCNC: 1.8 MG/DL — SIGNIFICANT CHANGE UP (ref 1.6–2.6)
MCHC RBC-ENTMCNC: 30.6 PG — SIGNIFICANT CHANGE UP (ref 27–34)
MCHC RBC-ENTMCNC: 33.6 G/DL — SIGNIFICANT CHANGE UP (ref 32–36)
MCV RBC AUTO: 91.2 FL — SIGNIFICANT CHANGE UP (ref 80–100)
METHOD TYPE: SIGNIFICANT CHANGE UP
NRBC # BLD: 0 /100 WBCS — SIGNIFICANT CHANGE UP (ref 0–0)
PHOSPHATE SERPL-MCNC: 2.7 MG/DL — SIGNIFICANT CHANGE UP (ref 2.5–4.5)
PLATELET # BLD AUTO: 225 K/UL — SIGNIFICANT CHANGE UP (ref 150–400)
POTASSIUM SERPL-MCNC: 3.4 MMOL/L — LOW (ref 3.5–5.3)
POTASSIUM SERPL-SCNC: 3.4 MMOL/L — LOW (ref 3.5–5.3)
PROCALCITONIN SERPL-MCNC: 8.12 NG/ML — HIGH (ref 0.02–0.1)
PROT SERPL-MCNC: 6.3 GM/DL — SIGNIFICANT CHANGE UP (ref 6–8.3)
RBC # BLD: 3.17 M/UL — LOW (ref 3.8–5.2)
RBC # FLD: 12.5 % — SIGNIFICANT CHANGE UP (ref 10.3–14.5)
SODIUM SERPL-SCNC: 140 MMOL/L — SIGNIFICANT CHANGE UP (ref 135–145)
T4 FREE SERPL-MCNC: 3.7 NG/DL — HIGH (ref 0.9–1.8)
WBC # BLD: 14.73 K/UL — HIGH (ref 3.8–10.5)
WBC # FLD AUTO: 14.73 K/UL — HIGH (ref 3.8–10.5)

## 2024-02-19 PROCEDURE — 99232 SBSQ HOSP IP/OBS MODERATE 35: CPT

## 2024-02-19 RX ORDER — LOSARTAN POTASSIUM 100 MG/1
50 TABLET, FILM COATED ORAL DAILY
Refills: 0 | Status: DISCONTINUED | OUTPATIENT
Start: 2024-02-19 | End: 2024-02-22

## 2024-02-19 RX ORDER — POTASSIUM CHLORIDE 20 MEQ
40 PACKET (EA) ORAL EVERY 4 HOURS
Refills: 0 | Status: DISCONTINUED | OUTPATIENT
Start: 2024-02-19 | End: 2024-02-19

## 2024-02-19 RX ORDER — LOSARTAN POTASSIUM 100 MG/1
1 TABLET, FILM COATED ORAL
Refills: 0 | DISCHARGE

## 2024-02-19 RX ORDER — POTASSIUM CHLORIDE 20 MEQ
40 PACKET (EA) ORAL EVERY 4 HOURS
Refills: 0 | Status: COMPLETED | OUTPATIENT
Start: 2024-02-19 | End: 2024-02-19

## 2024-02-19 RX ORDER — ATORVASTATIN CALCIUM 80 MG/1
1 TABLET, FILM COATED ORAL
Refills: 0 | DISCHARGE

## 2024-02-19 RX ADMIN — HEPARIN SODIUM 5000 UNIT(S): 5000 INJECTION INTRAVENOUS; SUBCUTANEOUS at 05:04

## 2024-02-19 RX ADMIN — Medication 40 MILLIEQUIVALENT(S): at 14:44

## 2024-02-19 RX ADMIN — INSULIN GLARGINE 15 UNIT(S): 100 INJECTION, SOLUTION SUBCUTANEOUS at 22:09

## 2024-02-19 RX ADMIN — Medication 6: at 11:56

## 2024-02-19 RX ADMIN — HEPARIN SODIUM 5000 UNIT(S): 5000 INJECTION INTRAVENOUS; SUBCUTANEOUS at 17:48

## 2024-02-19 RX ADMIN — LOSARTAN POTASSIUM 50 MILLIGRAM(S): 100 TABLET, FILM COATED ORAL at 14:45

## 2024-02-19 RX ADMIN — Medication 1000 MILLIGRAM(S): at 06:05

## 2024-02-19 RX ADMIN — Medication 40 MILLIEQUIVALENT(S): at 17:47

## 2024-02-19 RX ADMIN — ATORVASTATIN CALCIUM 10 MILLIGRAM(S): 80 TABLET, FILM COATED ORAL at 22:13

## 2024-02-19 RX ADMIN — Medication 650 MILLIGRAM(S): at 14:44

## 2024-02-19 RX ADMIN — SODIUM CHLORIDE 75 MILLILITER(S): 9 INJECTION INTRAMUSCULAR; INTRAVENOUS; SUBCUTANEOUS at 23:37

## 2024-02-19 RX ADMIN — Medication 100 MICROGRAM(S): at 05:05

## 2024-02-19 RX ADMIN — Medication 650 MILLIGRAM(S): at 15:59

## 2024-02-19 RX ADMIN — Medication 2: at 16:27

## 2024-02-19 RX ADMIN — SODIUM CHLORIDE 75 MILLILITER(S): 9 INJECTION INTRAMUSCULAR; INTRAVENOUS; SUBCUTANEOUS at 22:12

## 2024-02-19 NOTE — PROGRESS NOTE ADULT - ASSESSMENT
74 yo female w/ history of HTN, diabetes, acquired hypothyroidism presented to [facility] for generalized weakness and was admitted w/ sepsis POA due to UTI.     UTI  - c/w Rocephin    - urine culture growing E. coli and Klebsiella, sensitivities pending   - NGTD on blood culture from 2/17     Hypokalemia  - replace w/ KCl    Mild ventriculomegaly with secondary findings suggestive of normal pressure hydrocephalus on CT  - will consult neurology     DM II  - c/w Lantus & ISS  - Hgb A1C is 7.2    MICHELINE POA  - prerenal  - resolved     Acquired hypothyroidism  - c/w synthroid  - TSH is <0.005, will check free T4  - suspect patient was not adherent to her synthroid  - will ask endo for input    HTN  - resume home losartan     HLD  - c/w Lipitor    Prophylaxis:  DVT: Heparin  GI: PO diet    NOK: pt's , Pierre Fuller, and son on 2/18- [phone], [phone]. [phone].     Dispo: MIGUEL 74 yo female w/ history of HTN, diabetes, acquired hypothyroidism presented to [facility] for generalized weakness and was admitted w/ sepsis POA due to UTI.     UTI  - c/w Rocephin    - urine culture growing E. coli and Klebsiella, sensitivities pending   - NGTD on blood culture from 2/17     Hypokalemia  - replace w/ KCl    Mild ventriculomegaly with secondary findings suggestive of normal pressure hydrocephalus on CT  - will consult neurology     DM II  - c/w Lantus & ISS  - Hgb A1C is 7.2    MICHELINE POA  - prerenal  - resolved     Acquired hypothyroidism  - c/w synthroid  - TSH is <0.005, will check free T4  - suspect patient was not adherent to her synthroid  - will ask endo for input    HTN  - resume home losartan     HLD  - c/w Lipitor    Prophylaxis:  DVT: Heparin  GI: PO diet    NOK: pt's , Pierre Fuller, and son on 2/18- [phone], [phone]. [phone].     Dispo: MIGUEL, but patient is refusing MIGUEL and says she'll go home w/ home PT. 74 yo female w/ history of HTN, diabetes, acquired hypothyroidism presented to [facility] for generalized weakness and was admitted w/ sepsis POA due to UTI.     UTI  - c/w Rocephin    - urine culture growing E. coli and Klebsiella, sensitivities pending   - NGTD on blood culture from 2/17     Hypokalemia  - replace w/ KCl    Mild ventriculomegaly with secondary findings suggestive of normal pressure hydrocephalus on CT  - will consult neurology     DM II  - c/w Lantus & ISS  - Hgb A1C is 7.2    MICHELINE POA  - prerenal  - resolved     Acquired hypothyroidism  - TSH is <0.005 w/ free T4 is 3.7  - patient says she was taking synthroid w/ meds - yet is hyperthyroid - will hold synthroid   - will ask endo for input      HTN  - resume home losartan     HLD  - c/w Lipitor    Prophylaxis:  DVT: Heparin  GI: PO diet    NOK: pt's , Pierre Fuller, and son on 2/18- [phone], [phone]. [phone].     Dispo: MIGUEL, but patient is refusing MIGUEL and says she'll go home w/ home PT.

## 2024-02-19 NOTE — PHYSICAL THERAPY INITIAL EVALUATION ADULT - NSPTDISCHREC_GEN_A_CORE
to improve and build up strength, general endurance standing and ambulation balance to a safe level, prevent falls./Sub-acute Rehab

## 2024-02-19 NOTE — PHYSICAL THERAPY INITIAL EVALUATION ADULT - WEIGHT-BEARING RESTRICTIONS: CHAIR/BED,  REHAB EVAL
October 31, 2019    Shavon Valle  [address] [facility] [address]        Dear Shavon Valle,     [phone]      Sincerely,    Electronically signed    Javier Kraft MD full weight-bearing

## 2024-02-19 NOTE — PHYSICAL THERAPY INITIAL EVALUATION ADULT - RISK REDUCTION/PREVENTION, PT EVAL
Pt. presents with strength deficits in the extremities, decreased general endurance, difficulty in bed mobility and transfers, unsteady standing and ambulation balance, decreased safety awareness, fall risk./risk factors

## 2024-02-19 NOTE — PHYSICAL THERAPY INITIAL EVALUATION ADULT - PRECAUTIONS/LIMITATIONS, REHAB EVAL
Pt. presents with strength deficits in the extremities, decreased general endurance, difficulty in bed mobility and transfers, unsteady standing and ambulation balance, decreased safety awareness, fall risk.

## 2024-02-19 NOTE — PHYSICAL THERAPY INITIAL EVALUATION ADULT - ADDITIONAL COMMENTS
Pt states she is usually independent in ADLs at home and ambulates independently with rolling walker around the house.

## 2024-02-19 NOTE — PHYSICAL THERAPY INITIAL EVALUATION ADULT - STRENGTHENING, PT EVAL
Improve strength in the UE and LE to good and be able to perform functional tasks-bed mobility, sitting, standing, transfers and ambulate in a safe manner with assistive device and prevent falls.

## 2024-02-19 NOTE — PHYSICAL THERAPY INITIAL EVALUATION ADULT - PERTINENT HX OF CURRENT PROBLEM, REHAB EVAL
Pt is states she is usually in her state of health days ago, ambulates with a rolling walker around the house by herself and gradually feeling weak and having difficulty in ambulating. CT head 2/17 no acute intracranial hemorrhage, CXR 2/17: no active chest ds.

## 2024-02-19 NOTE — PHYSICAL THERAPY INITIAL EVALUATION ADULT - BED MOBILITY TRAINING, PT EVAL
Improve bed mobility to independent - in supine<>sit, rolling side<>side observing proper body mechanics, proper positioning, body alignment and precautions.

## 2024-02-20 LAB
-  AMOXICILLIN/CLAVULANIC ACID: SIGNIFICANT CHANGE UP
-  AMOXICILLIN/CLAVULANIC ACID: SIGNIFICANT CHANGE UP
-  AMPICILLIN/SULBACTAM: SIGNIFICANT CHANGE UP
-  AMPICILLIN/SULBACTAM: SIGNIFICANT CHANGE UP
-  AMPICILLIN: SIGNIFICANT CHANGE UP
-  AMPICILLIN: SIGNIFICANT CHANGE UP
-  AZTREONAM: SIGNIFICANT CHANGE UP
-  AZTREONAM: SIGNIFICANT CHANGE UP
-  CEFAZOLIN: SIGNIFICANT CHANGE UP
-  CEFAZOLIN: SIGNIFICANT CHANGE UP
-  CEFEPIME: SIGNIFICANT CHANGE UP
-  CEFEPIME: SIGNIFICANT CHANGE UP
-  CEFOXITIN: SIGNIFICANT CHANGE UP
-  CEFOXITIN: SIGNIFICANT CHANGE UP
-  CEFTRIAXONE: SIGNIFICANT CHANGE UP
-  CEFTRIAXONE: SIGNIFICANT CHANGE UP
-  CEFUROXIME: SIGNIFICANT CHANGE UP
-  CIPROFLOXACIN: SIGNIFICANT CHANGE UP
-  CIPROFLOXACIN: SIGNIFICANT CHANGE UP
-  ERTAPENEM: SIGNIFICANT CHANGE UP
-  ERTAPENEM: SIGNIFICANT CHANGE UP
-  GENTAMICIN: SIGNIFICANT CHANGE UP
-  GENTAMICIN: SIGNIFICANT CHANGE UP
-  IMIPENEM: SIGNIFICANT CHANGE UP
-  IMIPENEM: SIGNIFICANT CHANGE UP
-  LEVOFLOXACIN: SIGNIFICANT CHANGE UP
-  LEVOFLOXACIN: SIGNIFICANT CHANGE UP
-  MEROPENEM: SIGNIFICANT CHANGE UP
-  MEROPENEM: SIGNIFICANT CHANGE UP
-  NITROFURANTOIN: SIGNIFICANT CHANGE UP
-  NITROFURANTOIN: SIGNIFICANT CHANGE UP
-  PIPERACILLIN/TAZOBACTAM: SIGNIFICANT CHANGE UP
-  PIPERACILLIN/TAZOBACTAM: SIGNIFICANT CHANGE UP
-  TOBRAMYCIN: SIGNIFICANT CHANGE UP
-  TOBRAMYCIN: SIGNIFICANT CHANGE UP
-  TRIMETHOPRIM/SULFAMETHOXAZOLE: SIGNIFICANT CHANGE UP
-  TRIMETHOPRIM/SULFAMETHOXAZOLE: SIGNIFICANT CHANGE UP
ALBUMIN SERPL ELPH-MCNC: 1.8 G/DL — LOW (ref 3.3–5)
ALP SERPL-CCNC: 58 U/L — SIGNIFICANT CHANGE UP (ref 40–120)
ALT FLD-CCNC: 62 U/L — SIGNIFICANT CHANGE UP (ref 12–78)
ANION GAP SERPL CALC-SCNC: 9 MMOL/L — SIGNIFICANT CHANGE UP (ref 5–17)
AST SERPL-CCNC: 66 U/L — HIGH (ref 15–37)
BILIRUB SERPL-MCNC: 0.5 MG/DL — SIGNIFICANT CHANGE UP (ref 0.2–1.2)
BUN SERPL-MCNC: 29 MG/DL — HIGH (ref 7–23)
CALCIUM SERPL-MCNC: 8.2 MG/DL — LOW (ref 8.5–10.1)
CHLORIDE SERPL-SCNC: 109 MMOL/L — HIGH (ref 96–108)
CO2 SERPL-SCNC: 23 MMOL/L — SIGNIFICANT CHANGE UP (ref 22–31)
CREAT SERPL-MCNC: 1.17 MG/DL — SIGNIFICANT CHANGE UP (ref 0.5–1.3)
CULTURE RESULTS: ABNORMAL
EGFR: 49 ML/MIN/1.73M2 — LOW
GLUCOSE BLDC GLUCOMTR-MCNC: 114 MG/DL — HIGH (ref 70–99)
GLUCOSE BLDC GLUCOMTR-MCNC: 144 MG/DL — HIGH (ref 70–99)
GLUCOSE BLDC GLUCOMTR-MCNC: 188 MG/DL — HIGH (ref 70–99)
GLUCOSE BLDC GLUCOMTR-MCNC: 243 MG/DL — HIGH (ref 70–99)
GLUCOSE SERPL-MCNC: 108 MG/DL — HIGH (ref 70–99)
HCT VFR BLD CALC: 26 % — LOW (ref 34.5–45)
HGB BLD-MCNC: 8.6 G/DL — LOW (ref 11.5–15.5)
MAGNESIUM SERPL-MCNC: 1.4 MG/DL — LOW (ref 1.6–2.6)
MCHC RBC-ENTMCNC: 29.8 PG — SIGNIFICANT CHANGE UP (ref 27–34)
MCHC RBC-ENTMCNC: 33.1 G/DL — SIGNIFICANT CHANGE UP (ref 32–36)
MCV RBC AUTO: 90 FL — SIGNIFICANT CHANGE UP (ref 80–100)
METHOD TYPE: SIGNIFICANT CHANGE UP
METHOD TYPE: SIGNIFICANT CHANGE UP
NRBC # BLD: 0 /100 WBCS — SIGNIFICANT CHANGE UP (ref 0–0)
ORGANISM # SPEC MICROSCOPIC CNT: ABNORMAL
ORGANISM # SPEC MICROSCOPIC CNT: ABNORMAL
ORGANISM # SPEC MICROSCOPIC CNT: SIGNIFICANT CHANGE UP
PHOSPHATE SERPL-MCNC: 1.5 MG/DL — LOW (ref 2.5–4.5)
PLATELET # BLD AUTO: 267 K/UL — SIGNIFICANT CHANGE UP (ref 150–400)
POTASSIUM SERPL-MCNC: 3.1 MMOL/L — LOW (ref 3.5–5.3)
POTASSIUM SERPL-SCNC: 3.1 MMOL/L — LOW (ref 3.5–5.3)
PROT SERPL-MCNC: 6.2 GM/DL — SIGNIFICANT CHANGE UP (ref 6–8.3)
RBC # BLD: 2.89 M/UL — LOW (ref 3.8–5.2)
RBC # FLD: 12.5 % — SIGNIFICANT CHANGE UP (ref 10.3–14.5)
SODIUM SERPL-SCNC: 141 MMOL/L — SIGNIFICANT CHANGE UP (ref 135–145)
SPECIMEN SOURCE: SIGNIFICANT CHANGE UP
WBC # BLD: 12.73 K/UL — HIGH (ref 3.8–10.5)
WBC # FLD AUTO: 12.73 K/UL — HIGH (ref 3.8–10.5)

## 2024-02-20 PROCEDURE — 99232 SBSQ HOSP IP/OBS MODERATE 35: CPT

## 2024-02-20 RX ORDER — MAGNESIUM SULFATE 500 MG/ML
2 VIAL (ML) INJECTION EVERY 4 HOURS
Refills: 0 | Status: COMPLETED | OUTPATIENT
Start: 2024-02-20 | End: 2024-02-20

## 2024-02-20 RX ORDER — POTASSIUM CHLORIDE 20 MEQ
40 PACKET (EA) ORAL ONCE
Refills: 0 | Status: DISCONTINUED | OUTPATIENT
Start: 2024-02-20 | End: 2024-02-20

## 2024-02-20 RX ORDER — SODIUM,POTASSIUM PHOSPHATES 278-250MG
2 POWDER IN PACKET (EA) ORAL
Refills: 0 | Status: COMPLETED | OUTPATIENT
Start: 2024-02-20 | End: 2024-02-21

## 2024-02-20 RX ORDER — POTASSIUM CHLORIDE 20 MEQ
40 PACKET (EA) ORAL EVERY 4 HOURS
Refills: 0 | Status: COMPLETED | OUTPATIENT
Start: 2024-02-20 | End: 2024-02-20

## 2024-02-20 RX ADMIN — Medication 2 PACKET(S): at 21:48

## 2024-02-20 RX ADMIN — Medication 2 PACKET(S): at 11:38

## 2024-02-20 RX ADMIN — Medication 650 MILLIGRAM(S): at 20:00

## 2024-02-20 RX ADMIN — Medication 25 GRAM(S): at 13:36

## 2024-02-20 RX ADMIN — Medication 40 MILLIEQUIVALENT(S): at 13:36

## 2024-02-20 RX ADMIN — LOSARTAN POTASSIUM 50 MILLIGRAM(S): 100 TABLET, FILM COATED ORAL at 05:45

## 2024-02-20 RX ADMIN — Medication 650 MILLIGRAM(S): at 21:00

## 2024-02-20 RX ADMIN — Medication 4: at 11:38

## 2024-02-20 RX ADMIN — HEPARIN SODIUM 5000 UNIT(S): 5000 INJECTION INTRAVENOUS; SUBCUTANEOUS at 17:20

## 2024-02-20 RX ADMIN — Medication 2 PACKET(S): at 17:20

## 2024-02-20 RX ADMIN — CEFTRIAXONE 100 MILLIGRAM(S): 500 INJECTION, POWDER, FOR SOLUTION INTRAMUSCULAR; INTRAVENOUS at 23:24

## 2024-02-20 RX ADMIN — CEFTRIAXONE 100 MILLIGRAM(S): 500 INJECTION, POWDER, FOR SOLUTION INTRAMUSCULAR; INTRAVENOUS at 00:11

## 2024-02-20 RX ADMIN — Medication 40 MILLIEQUIVALENT(S): at 10:11

## 2024-02-20 RX ADMIN — Medication 25 GRAM(S): at 10:20

## 2024-02-20 RX ADMIN — ATORVASTATIN CALCIUM 10 MILLIGRAM(S): 80 TABLET, FILM COATED ORAL at 21:47

## 2024-02-20 RX ADMIN — INSULIN GLARGINE 15 UNIT(S): 100 INJECTION, SOLUTION SUBCUTANEOUS at 21:47

## 2024-02-20 RX ADMIN — Medication 40 MILLIEQUIVALENT(S): at 17:20

## 2024-02-20 RX ADMIN — Medication 2: at 17:19

## 2024-02-20 RX ADMIN — Medication 100 MICROGRAM(S): at 05:44

## 2024-02-20 RX ADMIN — HEPARIN SODIUM 5000 UNIT(S): 5000 INJECTION INTRAVENOUS; SUBCUTANEOUS at 05:44

## 2024-02-20 NOTE — PROGRESS NOTE ADULT - ASSESSMENT
74 yo female w/ history of HTN, diabetes, acquired hypothyroidism presented to [facility] for generalized weakness and was admitted w/ sepsis POA due to UTI.     UTI  - c/w Rocephin  - will treat as complicated UTI  - urine culture growing E. coli and Klebsiella, sensitivities pending   - NGTD on blood culture from 2/17     Acquired hypothyroidism  - TSH is <0.005 w/ free T4 is 3.7  - patient says she was taking synthroid w/ meds - yet is hyperthyroid - will hold synthroid   - will ask endo for input        Hypokalemia/ Hypomagnesemia/ Hypophosphatemia  - replace w/ KCl, Mg and Phos    Mild ventriculomegaly with secondary findings suggestive of normal pressure hydrocephalus on CT  - neurology note read and appreciated patient has possible NPH foot drop - will order MRI brain and L spine while patient is here but this should not delay her discharge as she can also get it as outpatient if it's not done by the time she's ready for discharge     DM II  - c/w Lantus & ISS  - Hgb A1C is 7.2    MICHELINE POA  - prerenal  - resolved      HTN  - resume home losartan     HLD  - c/w Lipitor    Prophylaxis:  DVT: Heparin  GI: PO diet    NOK: pt's , Pierre Fuller, and son on 2/18- [phone], [phone]. [phone].     Dispo: MIGUEL, but patient is refusing MIGUEL and says she'll go home w/ home PT.

## 2024-02-20 NOTE — CONSULT NOTE ADULT - SUBJECTIVE AND OBJECTIVE BOX
Neurology consult    EUFEMIA [id number]     Patient is a 73y old  Female who presents with a chief complaint of Weakness, dehydration, possible UTI (19 Feb 2024 11:35)      HPI:  73-year-old female PMH of hypertension, diabetes and acquired hypothyroidism presents today brought in by ambulance from home for evaluation for generalized weakness.  Patient states that her  called EMS today. She admits to being generally weak x 1 week, associated with decreased p.o. intake.  She denies chest pain, shortness of breath, nausea/vomiting, diarrhea, flulike symptoms, abdominal pain. On exam patient is awake alert and oriented x 3 nontoxic-appearing, slightly weak appearing, with dry lips.  Skin is warm to touch otherwise exam is benign.      Admitted for dehydration and possible UTI.  Patient found to have a rectal temperature of 101.8 in the ER.  (17 Feb 2024 16:59)    + incontinence, ambulates with walker    no difficulty with language.  No vision loss or double vision.  No dizziness, vertigo or new hearing loss.  . No difficulty with swallowing.  No focal weakness.  No focal sensory changes.  No numbness or tingling in the bilateral lower extremities.   REVIEW OF SYSTEMS:    Constitutional: No fever, chills, fatigue, weakness  Eyes: no eye pain, visual disturbances, or discharge  ENT:  No difficulty hearing, tinnitus, vertigo; No sinus or throat pain  Neck: No pain or stiffness  Respiratory: No cough, dyspnea, wheezing   Cardiovascular: No chest pain, palpitations,   Gastrointestinal: No abdominal or epigastric pain. No nausea, vomiting  No diarrhea or constipation.   Genitourinary: No dysuria, frequency, hematuria or incontinence  Neurological: No headaches, lightheadedness, vertigo, numbness or tremors  Psychiatric: No depression, anxiety, mood swings or difficulty sleeping  Musculoskeletal: No joint pain or swelling; No muscle, back or extremity pain  Skin: No itching, burning, rashes or lesions   Lymph Nodes: No enlarged glands  Endocrine: No heat or cold intolerance; No hair loss, No h/o diabetes or thyroid dysfunction  Allergy and Immunologic: No hives or eczema    MEDICATIONS    acetaminophen     Tablet .. 650 milliGRAM(s) Oral every 8 hours PRN  atorvastatin 10 milliGRAM(s) Oral at bedtime  cefTRIAXone   IVPB 1000 milliGRAM(s) IV Intermittent every 24 hours  dextrose 5%. 1000 milliLiter(s) IV Continuous <Continuous>  dextrose 5%. 1000 milliLiter(s) IV Continuous <Continuous>  dextrose 50% Injectable 25 Gram(s) IV Push once  dextrose 50% Injectable 12.5 Gram(s) IV Push once  dextrose 50% Injectable 25 Gram(s) IV Push once  dextrose Oral Gel 15 Gram(s) Oral once PRN  glucagon  Injectable 1 milliGRAM(s) IntraMuscular once  heparin   Injectable 5000 Unit(s) SubCutaneous every 12 hours  insulin glargine Injectable (LANTUS) 15 Unit(s) SubCutaneous at bedtime  insulin lispro (ADMELOG) corrective regimen sliding scale   SubCutaneous three times a day before meals  losartan 50 milliGRAM(s) Oral daily  magnesium sulfate  IVPB 2 Gram(s) IV Intermittent every 4 hours  potassium chloride   Powder 40 milliEquivalent(s) Oral every 4 hours  potassium phosphate / sodium phosphate Powder (PHOS-NaK) 2 Packet(s) Oral four times a day with meals      PMH: Diabetes    Acquired hypothyroidism    H/O: HTN (hypertension)    HLD (hyperlipidemia)         PSH: No significant past surgical history        Family history: No history of dementia, strokes, or seizures   FAMILY HISTORY:      SOCIAL HISTORY:  No history of tobacco or alcohol use     Allergies    No Known Allergies    Intolerances            Vital Signs Last 24 Hrs  T(C): 36.6 (20 Feb 2024 05:16), Max: 37.1 (19 Feb 2024 11:22)  T(F): 97.8 (20 Feb 2024 05:16), Max: 98.7 (19 Feb 2024 11:22)  HR: 86 (20 Feb 2024 05:16) (83 - 97)  BP: 158/83 (20 Feb 2024 05:16) (130/74 - 158/83)  BP(mean): --  RR: 17 (20 Feb 2024 05:16) (16 - 18)  SpO2: 96% (20 Feb 2024 05:16) (96% - 98%)    Parameters below as of 20 Feb 2024 05:16  Patient On (Oxygen Delivery Method): room air          On Neurological Examination:    Mental Status - Patient is alert, awake, oriented X3. fluent, names, no dysarthria no aphasia Follows commands well and able to answer questions appropriately. Mood and affect  normal    Cranial Nerves - PERRL, EOMI, VFF, V1-V3 intact, no gross facial asymmetry, tongue/uvula midline    Motor Exam -   4+ to 5 throughout left foot drop      Sensory: intact to light touch and pinprick bilaterally    Coord: FTN intact bilaterally     Gait -  unable to ambulate w/o wlaker    Reflexes:          1+ throughout                                               GENERAL Exam:     Nontoxic , No Acute Distress   	  HEENT:  normocephalic, atraumatic  		  LUNGS:	Clear bilaterally  No Wheeze    	  HEART:	 Normal S1S2   No murmur RRR        	  GI/ ABDOMEN:  Soft  Non tender    EXTREMITIES:   No Edema  No Clubbing  No Cyanosis No Edema    MUSCULOSKELETAL: Normal Range of Motion  	   SKIN:      Normal   No Ecchymosis               LABS:  CBC Full  -  ( 20 Feb 2024 06:17 )  WBC Count : 12.73 K/uL  RBC Count : 2.89 M/uL  Hemoglobin : 8.6 g/dL  Hematocrit : 26.0 %  Platelet Count - Automated : 267 K/uL  Mean Cell Volume : 90.0 fl  Mean Cell Hemoglobin : 29.8 pg  Mean Cell Hemoglobin Concentration : 33.1 g/dL  Auto Neutrophil # : x  Auto Lymphocyte # : x  Auto Monocyte # : x  Auto Eosinophil # : x  Auto Basophil # : x  Auto Neutrophil % : x  Auto Lymphocyte % : x  Auto Monocyte % : x  Auto Eosinophil % : x  Auto Basophil % : x    Urinalysis Basic - ( 20 Feb 2024 06:17 )    Color: x / Appearance: x / SG: x / pH: x  Gluc: 108 mg/dL / Ketone: x  / Bili: x / Urobili: x   Blood: x / Protein: x / Nitrite: x   Leuk Esterase: x / RBC: x / WBC x   Sq Epi: x / Non Sq Epi: x / Bacteria: x      02-20    141  |  109<H>  |  29<H>  ----------------------------<  108<H>  3.1<L>   |  23  |  1.17    Ca    8.2<L>      20 Feb 2024 06:17  Phos  1.5     02-20  Mg     1.4     02-20    TPro  6.2  /  Alb  1.8<L>  /  TBili  0.5  /  DBili  x   /  AST  66<H>  /  ALT  62  /  AlkPhos  58  02-20    LIVER FUNCTIONS - ( 20 Feb 2024 06:17 )  Alb: 1.8 g/dL / Pro: 6.2 gm/dL / ALK PHOS: 58 U/L / ALT: 62 U/L / AST: 66 U/L / GGT: x           Hemoglobin A1C:             RADIOLOGY  < from: CT Head No Cont (02.17.24 @ 17:57) >  IMPRESSION:    -No acute intracranial hemorrhage.    -Extensive white matter small vessel changes.    -Mild ventriculomegaly with secondary findings suggestive of normal   pressure hydrocephalus.    --- End of Report ---      < end of copied text >

## 2024-02-21 DIAGNOSIS — E05.90 THYROTOXICOSIS, UNSPECIFIED WITHOUT THYROTOXIC CRISIS OR STORM: ICD-10-CM

## 2024-02-21 LAB
ALBUMIN SERPL ELPH-MCNC: 1.7 G/DL — LOW (ref 3.3–5)
ALP SERPL-CCNC: 68 U/L — SIGNIFICANT CHANGE UP (ref 40–120)
ALT FLD-CCNC: 64 U/L — SIGNIFICANT CHANGE UP (ref 12–78)
ANION GAP SERPL CALC-SCNC: 4 MMOL/L — LOW (ref 5–17)
AST SERPL-CCNC: 69 U/L — HIGH (ref 15–37)
BILIRUB SERPL-MCNC: 0.6 MG/DL — SIGNIFICANT CHANGE UP (ref 0.2–1.2)
BUN SERPL-MCNC: 20 MG/DL — SIGNIFICANT CHANGE UP (ref 7–23)
CALCIUM SERPL-MCNC: 8.3 MG/DL — LOW (ref 8.5–10.1)
CHLORIDE SERPL-SCNC: 111 MMOL/L — HIGH (ref 96–108)
CO2 SERPL-SCNC: 25 MMOL/L — SIGNIFICANT CHANGE UP (ref 22–31)
CREAT SERPL-MCNC: 1 MG/DL — SIGNIFICANT CHANGE UP (ref 0.5–1.3)
EGFR: 59 ML/MIN/1.73M2 — LOW
GLUCOSE BLDC GLUCOMTR-MCNC: 115 MG/DL — HIGH (ref 70–99)
GLUCOSE BLDC GLUCOMTR-MCNC: 143 MG/DL — HIGH (ref 70–99)
GLUCOSE BLDC GLUCOMTR-MCNC: 154 MG/DL — HIGH (ref 70–99)
GLUCOSE BLDC GLUCOMTR-MCNC: 208 MG/DL — HIGH (ref 70–99)
GLUCOSE SERPL-MCNC: 103 MG/DL — HIGH (ref 70–99)
HCT VFR BLD CALC: 26.7 % — LOW (ref 34.5–45)
HGB BLD-MCNC: 8.8 G/DL — LOW (ref 11.5–15.5)
MAGNESIUM SERPL-MCNC: 1.9 MG/DL — SIGNIFICANT CHANGE UP (ref 1.6–2.6)
MCHC RBC-ENTMCNC: 30 PG — SIGNIFICANT CHANGE UP (ref 27–34)
MCHC RBC-ENTMCNC: 33 G/DL — SIGNIFICANT CHANGE UP (ref 32–36)
MCV RBC AUTO: 91.1 FL — SIGNIFICANT CHANGE UP (ref 80–100)
NRBC # BLD: 0 /100 WBCS — SIGNIFICANT CHANGE UP (ref 0–0)
PHOSPHATE SERPL-MCNC: 2.4 MG/DL — LOW (ref 2.5–4.5)
PLATELET # BLD AUTO: 296 K/UL — SIGNIFICANT CHANGE UP (ref 150–400)
POTASSIUM SERPL-MCNC: 4.6 MMOL/L — SIGNIFICANT CHANGE UP (ref 3.5–5.3)
POTASSIUM SERPL-SCNC: 4.6 MMOL/L — SIGNIFICANT CHANGE UP (ref 3.5–5.3)
PROT SERPL-MCNC: 6.4 GM/DL — SIGNIFICANT CHANGE UP (ref 6–8.3)
RBC # BLD: 2.93 M/UL — LOW (ref 3.8–5.2)
RBC # FLD: 12.6 % — SIGNIFICANT CHANGE UP (ref 10.3–14.5)
SODIUM SERPL-SCNC: 140 MMOL/L — SIGNIFICANT CHANGE UP (ref 135–145)
T4 FREE SERPL-MCNC: 2.9 NG/DL — HIGH (ref 0.9–1.8)
TSH SERPL-MCNC: <0.005 UIU/ML — LOW (ref 0.36–3.74)
WBC # BLD: 11.5 K/UL — HIGH (ref 3.8–10.5)
WBC # FLD AUTO: 11.5 K/UL — HIGH (ref 3.8–10.5)

## 2024-02-21 PROCEDURE — 70551 MRI BRAIN STEM W/O DYE: CPT | Mod: 26

## 2024-02-21 PROCEDURE — 72146 MRI CHEST SPINE W/O DYE: CPT | Mod: 26

## 2024-02-21 PROCEDURE — 72131 CT LUMBAR SPINE W/O DYE: CPT | Mod: 26

## 2024-02-21 PROCEDURE — 72148 MRI LUMBAR SPINE W/O DYE: CPT | Mod: 26

## 2024-02-21 PROCEDURE — 72052 X-RAY EXAM NECK SPINE 6/>VWS: CPT | Mod: 26

## 2024-02-21 PROCEDURE — 72110 X-RAY EXAM L-2 SPINE 4/>VWS: CPT | Mod: 26

## 2024-02-21 PROCEDURE — 99232 SBSQ HOSP IP/OBS MODERATE 35: CPT

## 2024-02-21 PROCEDURE — 72141 MRI NECK SPINE W/O DYE: CPT | Mod: 26

## 2024-02-21 RX ADMIN — LOSARTAN POTASSIUM 50 MILLIGRAM(S): 100 TABLET, FILM COATED ORAL at 05:41

## 2024-02-21 RX ADMIN — Medication 650 MILLIGRAM(S): at 21:25

## 2024-02-21 RX ADMIN — HEPARIN SODIUM 5000 UNIT(S): 5000 INJECTION INTRAVENOUS; SUBCUTANEOUS at 17:24

## 2024-02-21 RX ADMIN — ATORVASTATIN CALCIUM 10 MILLIGRAM(S): 80 TABLET, FILM COATED ORAL at 21:25

## 2024-02-21 RX ADMIN — Medication 2: at 16:34

## 2024-02-21 RX ADMIN — HEPARIN SODIUM 5000 UNIT(S): 5000 INJECTION INTRAVENOUS; SUBCUTANEOUS at 05:41

## 2024-02-21 RX ADMIN — INSULIN GLARGINE 15 UNIT(S): 100 INJECTION, SOLUTION SUBCUTANEOUS at 21:26

## 2024-02-21 RX ADMIN — Medication 2 PACKET(S): at 08:19

## 2024-02-21 RX ADMIN — Medication 2 PACKET(S): at 16:34

## 2024-02-21 RX ADMIN — Medication 2 PACKET(S): at 11:52

## 2024-02-21 RX ADMIN — Medication 4: at 11:53

## 2024-02-21 NOTE — CONSULT NOTE ADULT - PROBLEM SELECTOR RECOMMENDATION 9
Patient complains of weakness and has subtle signs and symptoms of hyperthyroidism.  She should be started on low-dose methimazole.  It might also be a possibility she may have thyroiditis or transient hyperthyroidism which has occurred in the hypothyroid patient.  Check ESR to rule out any thyroiditis.  She does not complain of any pain in the thyroid region so it could be painless thyroiditis.  Regardless start on low-dose methimazole and check thyroid function in a few days.  This is a very common finding these days specially as labs are coming back as hyperthyroid in the hypothyroid patient  Thank you for the courtesy of this consultation

## 2024-02-21 NOTE — CONSULT NOTE ADULT - SUBJECTIVE AND OBJECTIVE BOX
Pt is a 73F PMH DM with chronic neuropathy in BL feet, HTN, hypothyroidism was admitted to hospital for UTI, generalized weakness from decreased PO intake. Reports chronic LBP with radiating pain down her BL legs posteriorly into her feet thats been gradually worsening, left worse than right side. Pain worsens with movement/walking, ambulates with walker at baseline. She has not seen a spine specialist, follows with pain management who has been managing her pain with conservative management including injections, NSAIDs, gabapentin without improvement. Pt and family states they've exhausted all options and nothing improves her pain. Family also reports she has been becoming more unsteady on her feet and fell on Friday, did not hit head or LOC. Pt denies worsening pain acutely, new numbness or tingling, or any incontinence.    Vital Signs (24 Hrs):  T(C): 37 (02-21-24 @ 11:23), Max: 37.1 (02-20-24 @ 23:16)  HR: 81 (02-21-24 @ 11:23) (80 - 84)  BP: 144/79 (02-21-24 @ 11:23) (119/68 - 144/79)  RR: 17 (02-21-24 @ 11:23) (17 - 19)  SpO2: 97% (02-21-24 @ 11:23) (97% - 98%)  Wt(kg): --    LABS:                          8.8    11.50 )-----------( 296      ( 21 Feb 2024 05:40 )             26.7     02-21    140  |  111<H>  |  20  ----------------------------<  103<H>  4.6   |  25  |  1.00    Ca    8.3<L>      21 Feb 2024 05:40  Phos  2.4     02-21  Mg     1.9     02-21    TPro  6.4  /  Alb  1.7<L>  /  TBili  0.6  /  DBili  x   /  AST  69<H>  /  ALT  64  /  AlkPhos  68  02-21    LIVER FUNCTIONS - ( 21 Feb 2024 05:40 )  Alb: 1.7 g/dL / Pro: 6.4 gm/dL / ALK PHOS: 68 U/L / ALT: 64 U/L / AST: 69 U/L / GGT: x           Physical Exam:   General: NAD, patient laying in bed comfortably  Calves nontender  Compartments compressible, soft    Spine:  NTTP of C-,T-,L-Spine, no step offs    MOTOR EXAM:                          Elbow Flex (C5)     Wrist Ext (C6)     Elbow Ext (C7)      Finger Flex (C8)    Finger Abduction (T1)  RIGHT                 5/5                         5/5                         5/5                          5/5                              5/5  LEFT                    5/5                         5/5                         5/5                          5/5                              5/5                         Hip Flex (L2)      Knee Ext (L3)      Ank Dorsiflex (L4)     Hallux Ext (L5)     Ank PlantarFlex (S1)  RIGHT               5/5                      5/5                          5/5                            5/5                           5/5  LEFT                  4/5                      5/5                          4/5                            5/5                           5/5    SENSORY EXAM:                        C5      C6      C7      C8       T1          RIGHT          2         2        2         2         2          (0=absent, 1=impaired, 2=normal, NT=not testable)  LEFT             2         2        2         2         2          (0=absent, 1=impaired, 2=normal, NT=not testable)                      L2        L3       L4      L5       S1          RIGHT        2          2         2        2        2           (0=absent, 1=impaired, 2=normal, NT=not testable)  LEFT           2          2         2        2        2           (0=absent, 1=impaired, 2=normal, NT=not testable)    No pain with SLR, no clonus, no babinksi, no bartlett    Secondary Assessment:  NC/AT, NTTP of clavicles, NTTP of Pelvis  UEs: NTTP of Shoulders, Elbows, Wrists, Hands; NT with AROM/PROM of Shoulders, Elbows, Wrists, Hands; AIN/PIN/Med/Uln/Msc/Rad/Ax intact  LEs: Able to SLR, NT with Log Roll, NT with Heel Strike, NTTP of Hips, Knees, Ankles, Feet; NT with AROM/PROM of Hips, Knees, Ankles, Feet; Q/H/Gsc/TA/EHL/FHL intact    Imaging:  MRI LSp: Degenerative spondylosis as described above with severe spinal stenosis L4-5 due to accommodation of a degenerative facet change and anterolisthesis.    **INCOMPLETE** **INCOMPLETE** **INCOMPLETE** **INCOMPLETE** **INCOMPLETE** **INCOMPLETE**  Pt is a 73F PMH DM with chronic neuropathy in BL feet, HTN, hypothyroidism was admitted to hospital for UTI, generalized weakness from decreased PO intake. Reports chronic LBP with radiating pain down her BL legs posteriorly into her feet thats been gradually worsening, left worse than right side. Pain worsens with movement/walking, ambulates with walker at baseline. She has not seen a spine specialist, follows with pain management who has been managing her pain with conservative management including injections, NSAIDs, gabapentin without improvement. Pt and family states they've exhausted all options and nothing improves her pain. Family also reports she has been becoming more unsteady on her feet and fell on Friday, did not hit head or LOC. Pt denies worsening pain acutely, new numbness or tingling, or any incontinence.    Vital Signs (24 Hrs):  T(C): 37 (02-21-24 @ 11:23), Max: 37.1 (02-20-24 @ 23:16)  HR: 81 (02-21-24 @ 11:23) (80 - 84)  BP: 144/79 (02-21-24 @ 11:23) (119/68 - 144/79)  RR: 17 (02-21-24 @ 11:23) (17 - 19)  SpO2: 97% (02-21-24 @ 11:23) (97% - 98%)  Wt(kg): --    LABS:                          8.8    11.50 )-----------( 296      ( 21 Feb 2024 05:40 )             26.7     02-21    140  |  111<H>  |  20  ----------------------------<  103<H>  4.6   |  25  |  1.00    Ca    8.3<L>      21 Feb 2024 05:40  Phos  2.4     02-21  Mg     1.9     02-21    TPro  6.4  /  Alb  1.7<L>  /  TBili  0.6  /  DBili  x   /  AST  69<H>  /  ALT  64  /  AlkPhos  68  02-21    LIVER FUNCTIONS - ( 21 Feb 2024 05:40 )  Alb: 1.7 g/dL / Pro: 6.4 gm/dL / ALK PHOS: 68 U/L / ALT: 64 U/L / AST: 69 U/L / GGT: x           Physical Exam:   General: NAD, patient laying in bed comfortably  Calves nontender  Compartments compressible, soft    Spine:  NTTP of C-,T-,L-Spine, no step offs    MOTOR EXAM:                          Elbow Flex (C5)     Wrist Ext (C6)     Elbow Ext (C7)      Finger Flex (C8)    Finger Abduction (T1)  RIGHT                 5/5                         5/5                         5/5                          5/5                              5/5  LEFT                    5/5                         5/5                         5/5                          5/5                              5/5                         Hip Flex (L2)      Knee Ext (L3)      Ank Dorsiflex (L4)     Hallux Ext (L5)     Ank PlantarFlex (S1)  RIGHT               5/5                      5/5                          5/5                            5/5                           5/5  LEFT                  4/5                      5/5                          4/5                            5/5                           5/5    SENSORY EXAM:                        C5      C6      C7      C8       T1          RIGHT          2         2        2         2         2          (0=absent, 1=impaired, 2=normal, NT=not testable)  LEFT             2         2        2         2         2          (0=absent, 1=impaired, 2=normal, NT=not testable)                      L2        L3       L4      L5       S1          RIGHT        2          2         2        2        2           (0=absent, 1=impaired, 2=normal, NT=not testable)  LEFT           2          2         2        2        2           (0=absent, 1=impaired, 2=normal, NT=not testable)    No pain with SLR, no clonus, no babinksi, no bartlett    Secondary Assessment:  NC/AT, NTTP of clavicles, NTTP of Pelvis  UEs: NTTP of Shoulders, Elbows, Wrists, Hands; NT with AROM/PROM of Shoulders, Elbows, Wrists, Hands; AIN/PIN/Med/Uln/Msc/Rad/Ax intact  LEs: Able to SLR, NT with Log Roll, NT with Heel Strike, NTTP of Hips, Knees, Ankles, Feet; NT with AROM/PROM of Hips, Knees, Ankles, Feet; Q/H/Gsc/TA/EHL/FHL intact    Imaging:  MRI LSp: Degenerative spondylosis as described above with severe spinal stenosis L4-5 due to accommodation of a degenerative facet change and anterolisthesis.    A/P:  73F who presents with lumbar stenosis/radiculopathy  Her generalized weakness, imbalance, and radicular pain seem to be chronic in nature. Spoke to Dr. Ocampo (Neurology) regarding possible NPH who recommended outpatient workup. Her NPH should be worked up first before addressing possible lumbar etiology.    Obtain additional imaging MRI C/TSp, CT LSp, Xray C/LSp  WBAT  PT/OT  Analgesics  DVT PPx per primary team  No acute orthopaedic interventions at this time  Stable for discharge from orthopaedic standpoint   Can follow up with Dr. Lehman in office for further management  Ortho to sign off, can reconsult with any changes in clinical course  Appreciate medical recs  Will discuss plan with Dr. Lehman and notify primary team of any changes to plan   HPI  Pt is a 73F PMH DM with chronic neuropathy in BL feet, HTN, hypothyroidism was admitted to hospital for UTI, generalized weakness from decreased PO intake. The patient and patient's family reported history of chronic urinary incontinence, gait unsteadiness, low back pain with intermittent bilateral lower extremity radicular pain.  They report that the patient is not following with pain management who has been providing care consisting of injections, anti-inflammatories, gabapentin.  Patient reports he ambulates with a walker at baseline.  The family reports that the patient has been increasingly unsteady recently.  The patient denies that her symptoms are recently significantly increased compared to her baseline.  They deny any other complaints at this time.    Vital Signs (24 Hrs):  T(C): 37 (02-21-24 @ 11:23), Max: 37.1 (02-20-24 @ 23:16)  HR: 81 (02-21-24 @ 11:23) (80 - 84)  BP: 144/79 (02-21-24 @ 11:23) (119/68 - 144/79)  RR: 17 (02-21-24 @ 11:23) (17 - 19)  SpO2: 97% (02-21-24 @ 11:23) (97% - 98%)  Wt(kg): --    LABS:                          8.8    11.50 )-----------( 296      ( 21 Feb 2024 05:40 )             26.7     02-21    140  |  111<H>  |  20  ----------------------------<  103<H>  4.6   |  25  |  1.00    Ca    8.3<L>      21 Feb 2024 05:40  Phos  2.4     02-21  Mg     1.9     02-21    TPro  6.4  /  Alb  1.7<L>  /  TBili  0.6  /  DBili  x   /  AST  69<H>  /  ALT  64  /  AlkPhos  68  02-21    LIVER FUNCTIONS - ( 21 Feb 2024 05:40 )  Alb: 1.7 g/dL / Pro: 6.4 gm/dL / ALK PHOS: 68 U/L / ALT: 64 U/L / AST: 69 U/L / GGT: x           Physical Exam:   General: NAD, patient laying in bed comfortably  Calves nontender  Compartments compressible, soft    Spine:  NTTP of C-,T-,L-Spine, no step offs    MOTOR EXAM:                          Elbow Flex (C5)     Wrist Ext (C6)     Elbow Ext (C7)      Finger Flex (C8)    Finger Abduction (T1)  RIGHT                 5/5                         5/5                         5/5                          5/5                              5/5  LEFT                    5/5                         5/5                         5/5                          5/5                              5/5                         Hip Flex (L2)      Knee Ext (L3)      Ank Dorsiflex (L4)     Hallux Ext (L5)     Ank PlantarFlex (S1)  RIGHT               5/5                      5/5                          5/5                            5/5                           5/5  LEFT                  4/5                      5/5                          4/5                            5/5                           5/5    SENSORY EXAM:                        C5      C6      C7      C8       T1          RIGHT          2         2        2         2         2          (0=absent, 1=impaired, 2=normal, NT=not testable)  LEFT             2         2        2         2         2          (0=absent, 1=impaired, 2=normal, NT=not testable)                      L2        L3       L4      L5       S1          RIGHT        2          2         2        2        2           (0=absent, 1=impaired, 2=normal, NT=not testable)  LEFT           2          2         2        2        2           (0=absent, 1=impaired, 2=normal, NT=not testable)    No pain with SLR, no clonus, no babinksi, no bartlett    Secondary Assessment:  NC/AT, NTTP of clavicles, NTTP of Pelvis  UEs: NTTP of Shoulders, Elbows, Wrists, Hands; NT with AROM/PROM of Shoulders, Elbows, Wrists, Hands; AIN/PIN/Med/Uln/Msc/Rad/Ax intact  LEs: Able to SLR, NT with Log Roll, NT with Heel Strike, NTTP of Hips, Knees, Ankles, Feet; NT with AROM/PROM of Hips, Knees, Ankles, Feet; Q/H/Gsc/TA/EHL/FHL intact    Imaging  X-ray cervical spine AP and lateral performed on 2/21/2024 from [facility] demonstrates severe multilevel spondylosis with loss of cervical lordosis.    X-ray lumbar spine AP and lateral performed on 2/21/2024 from [facility] demonstrates moderate multilevel spondylosis.  L4-L5 grade 1 anterolisthesis.    CT lumbar spine without contrast l performed on 2/21/2024 from [facility] demonstrates moderate to severe multilevel spondylosis.  T11-T12 broad herniated nucleus pulposus with posterior intervertebral disc calcification.  L1-L2 broad herniated nucleus pulposus with disc calcification.  L4-L5 grade 1 anterolisthesis.    MRI cervical spine without contrast performed on 2/21/2024 from [facility] demonstrates maintained cervical lordosis.  C4-C7 severe central canal stenosis.    MRI thoracic spine without contrast [facility] demonstrates overall maintained alignment.  Multilevel spondylosis.  T11-T12 broad herniated nucleus pulposus with abutment of the spinal cord and mild central canal stenosis.    MRI lumbar spine without contrast [facility] demonstrates moderate to severe multilevel spondylosis.  L1-L2 moderate to severe central canal stenosis.  L2-L3 moderate to severe central canal stenosis.  L3-L4 mild central canal stenosis.  L4-L5 grade 1 anterolisthesis with severe central canal stenosis and moderate to severe bilateral neuroforaminal stenosis.  L5-S1 mild central canal stenosis, bilateral (right greater than left) neuroforaminal stenosis.    Report of MRI head performed on on 2/21/2024 from [facility] describes "1. No evidence of acute infarct or midline shift.  2. Chronic ischemic changes.  3. Ventricles are prominent when compared to the sulci. This is likely secondary to a ex vacuo dilatation appearance secondary to parenchymal volume loss. A underlying or concordant normal pressure hydrocephalus could present similarly. Recommend clinical correlation."      Assessment and plan  73-year-old female with normal pressure hydrocephalus, C4-C7 severe central canal stenosis, multilevel lumbar spondylosis including L1-L2 moderate to severe central canal stenosis.  L2-L3 moderate to severe central canal stenosis.  L3-L4 mild central canal stenosis.  L4-L5 grade 1 anterolisthesis with severe central canal stenosis and moderate to severe bilateral neuroforaminal stenosis.  L5-S1 mild central canal stenosis, bilateral (right greater than left) neuroforaminal stenosis.    Ambulate as tolerated  Multimodal pain control  Appreciate neurology recommendations -the patient's presentation is possibly multifactorial and requires thorough evaluation prior to consideration of spinal surgical intervention.  The patient may be presenting with symptoms related to normal pressure hydrocephalus, spinal stenosis as detailed above, and/or peripheral neuropathy.  Given that the patient will require neurologic workup of the above, as well as medical management and treatment for UTI  - from an orthopedic spinal surgical perspective, patient is stable for discharge at this time  Follow-up as outpatient within 1 month, or earlier if any concerns or progression of symptoms

## 2024-02-21 NOTE — PATIENT PROFILE ADULT - OVER THE PAST TWO WEEKS, HAVE YOU FELT LITTLE INTEREST OR PLEASURE IN DOING THINGS?
Tony Ritchie  Cardiac Electrophysiology  [address] [place] [address], [address]  [place] [address]  Phone: [phone]  Fax: [phone]  Established Patient  Follow Up Time: 1 month   no

## 2024-02-21 NOTE — CONSULT NOTE ADULT - REASON FOR ADMISSION
Weakness, dehydration, possible UTI

## 2024-02-21 NOTE — CONSULT NOTE ADULT - SUBJECTIVE AND OBJECTIVE BOX
Patient is a 73y old  Female who presents with a chief complaint of Weakness, dehydration, possible UTI (21 Feb 2024 13:05)      Reason For Consult: Hyperthyroidism     HPI:  73-year-old female PMH of hypertension, diabetes and acquired hypothyroidism presents today brought in by ambulance from home for evaluation for generalized weakness.  Patient states that her  called EMS today. She admits to being generally weak x 1 week, associated with decreased p.o. intake.  She denies chest pain, shortness of breath, nausea/vomiting, diarrhea, flulike symptoms, abdominal pain. On exam patient is awake alert and oriented x 3 nontoxic-appearing, slightly weak appearing, with dry lips.  Skin is warm to touch otherwise exam is benign.      Admitted for dehydration and possible UTI.  Patient found to have a rectal temperature of 101.8 in the ER.  (17 Feb 2024 16:59)  Patient has been admitted for UTI.  At home she is on levothyroxine 100 mcg for hypothyroidism.  Upon further questioning she does admit that she has had fluctuating TSH and has been off and on on levothyroxine.  She has no prior history of hyperthyroidism.  Upon physical examination she has mild features of hyperthyroidism.  Currently levothyroxine has been held back as her TSH is completely suppressed and free thyroxine is increased at 2.9    PAST MEDICAL & SURGICAL HISTORY:  Diabetes  Hypothyroidism           H/O: HTN (hypertension)      No significant past surgical history          FAMILY HISTORY:        Social History:    MEDICATIONS  (STANDING):  atorvastatin 10 milliGRAM(s) Oral at bedtime  cefTRIAXone   IVPB 1000 milliGRAM(s) IV Intermittent every 24 hours  dextrose 5%. 1000 milliLiter(s) (100 mL/Hr) IV Continuous <Continuous>  dextrose 5%. 1000 milliLiter(s) (50 mL/Hr) IV Continuous <Continuous>  dextrose 50% Injectable 25 Gram(s) IV Push once  dextrose 50% Injectable 12.5 Gram(s) IV Push once  dextrose 50% Injectable 25 Gram(s) IV Push once  glucagon  Injectable 1 milliGRAM(s) IntraMuscular once  heparin   Injectable 5000 Unit(s) SubCutaneous every 12 hours  insulin glargine Injectable (LANTUS) 15 Unit(s) SubCutaneous at bedtime  insulin lispro (ADMELOG) corrective regimen sliding scale   SubCutaneous three times a day before meals  losartan 50 milliGRAM(s) Oral daily    MEDICATIONS  (PRN):  acetaminophen     Tablet .. 650 milliGRAM(s) Oral every 8 hours PRN Temp greater or equal to 38C (100.4F), Mild Pain (1 - 3)  dextrose Oral Gel 15 Gram(s) Oral once PRN Blood Glucose LESS THAN 70 milliGRAM(s)/deciliter      REVIEW OF SYSTEMS:  CONSTITUTIONAL: mild anxiety  HEENT:  Eyes:  No diplopia or blurred vision.   ENT:  No earache, sore throat or runny nose.  CARDIOVASCULAR:  No chest pain .  RESPIRATORY:  No cough, shortness of breath, PND or orthopnea.  GASTROINTESTINAL:  No nausea, vomiting or diarrhea.  GENITOURINARY:  No dysuria, frequency or urgency. No Blood in urine  MUSCULOSKELETAL:  no joint aches, no muscle pain, myalgia  SKIN:  No change in skin, hair or nails.  NEUROLOGIC:  No paresthesias, fasciculations, seizures or weakness.  PSYCHIATRIC:  No disorder of thought or mood.  ENDOCRINE:   heat  intolerance, HEMATOLOGICAL:  No easy bruising or bleeding.     T(C): 37.1 (02-21-24 @ 17:14), Max: 37.1 (02-20-24 @ 23:16)  HR: 73 (02-21-24 @ 17:14) (73 - 83)  BP: 144/85 (02-21-24 @ 17:14) (119/68 - 144/85)  RR: 15 (02-21-24 @ 17:14) (15 - 19)  SpO2: 97% (02-21-24 @ 17:14) (97% - 97%)  Wt(kg): --    PHYSICAL EXAM:  GENERAL: NAD, well-groomed, well-developed  HEAD:  Atraumatic, Normocephalic  EYES: PERRLA, conjunctiva and sclera clear  ENMT: No  exudates,, Moist mucous membranes,, No lesions  NECK: Supple, No JVD,   NERVOUS SYSTEM:  Alert & Oriented   CHEST/LUNG: Clear to percussion bilaterally; No rales, rhonchi, wheezing, or rubs  HEART: Regular rate and rhythm; No murmurs, rubs, or gallops  ABDOMEN: Soft, Nontender, Nondistended; Bowel sounds present  EXTREMITIES:  2+ Peripheral Pulses, No clubbing, cyanosis, or edema  LYMPH: No lymphadenopathy noted  SKIN: No rashes or lesions    CAPILLARY BLOOD GLUCOSE      POCT Blood Glucose.: 143 mg/dL (21 Feb 2024 21:26)  POCT Blood Glucose.: 154 mg/dL (21 Feb 2024 16:21)  POCT Blood Glucose.: 208 mg/dL (21 Feb 2024 11:05)  POCT Blood Glucose.: 115 mg/dL (21 Feb 2024 08:09)                            8.8    11.50 )-----------( 296      ( 21 Feb 2024 05:40 )             26.7       CMP:  02-21 @ 05:40  SGPT 64  Albumin 1.7   Alk Phos 68   Anion Gap 4   SGOT 69   Total Bili 0.6   BUN 20   Calcium Total 8.3   CO2 25   Chloride 111   Creatinine 1.00   eGFR if AA --   eGFR if non AA --   Glucose 103   Potassium 4.6   Protein 6.4   Sodium 140      Thyroid Function Tests:  02-21 @ 05:40 TSH <0.005 FreeT4 2.9 T3 -- Anti TPO -- Anti Thyroglobulin Ab -- TSI --      Diabetes Tests:     Parathyroids:     Adrenals:       Radiology:

## 2024-02-21 NOTE — CONSULT NOTE ADULT - ASSESSMENT
73-year-old female PMH of hypertension, diabetes and acquired hypothyroidism here with UTI. chronic incontinence gait issues  PE generalized weakness left foot drop  CTH concerning for NPH, DESH    Impression: possible NPH foot drop      MRI brain and L spine can be done here or as an outpt  Can follow up with Neurology, Dr. Semaj Ocampo at [phone]  On abx
Hyperthyroidism

## 2024-02-21 NOTE — PROGRESS NOTE ADULT - ASSESSMENT
74 yo female w/ history of HTN, diabetes, acquired hypothyroidism presented to [facility] for generalized weakness and was admitted w/ sepsis POA due to UTI.     UTI  - c/w Rocephin  - will treat as complicated UTI  - urine culture growing E. coli and Klebsiella, sensitivities pending   -blood cultures with bacteremia, matching urine       medication induced hyperthyroidism ?  - TSH is <0.005 w/ free T4 is 3.7  - patient says she was taking synthroid w/ meds -  - will hold synthroid 100mcg  - will ask endo for input        Hypokalemia/ Hypomagnesemia/ Hypophosphatemia  - replace w/ KCl, Mg and Phos    Mild ventriculomegaly with secondary findings suggestive of normal pressure hydrocephalus on CT  - neurology note read and appreciated patient has possible NPH foot drop - will order MRI brain and L spine while patient is here but this should not delay her discharge as she can also get it as outpatient if it's not done by the time she's ready for discharge     DM II  - c/w Lantus & ISS  - Hgb A1C is 7.2    MICHELINE POA  - prerenal  - resolved      HTN  - resume home losartan     HLD  - c/w Lipitor    Prophylaxis:  DVT: Heparin  GI: PO diet    NOK: pt's , Pierre Fuller, and son on 2/18- [phone], [phone]. [phone].     Dispo: [facility]

## 2024-02-22 ENCOUNTER — TRANSCRIPTION ENCOUNTER (OUTPATIENT)
Age: 74
End: 2024-02-22

## 2024-02-22 VITALS
HEART RATE: 82 BPM | SYSTOLIC BLOOD PRESSURE: 147 MMHG | DIASTOLIC BLOOD PRESSURE: 78 MMHG | TEMPERATURE: 99 F | RESPIRATION RATE: 18 BRPM | OXYGEN SATURATION: 95 %

## 2024-02-22 LAB
-  AMPICILLIN/SULBACTAM: SIGNIFICANT CHANGE UP
-  AMPICILLIN: SIGNIFICANT CHANGE UP
-  AZTREONAM: SIGNIFICANT CHANGE UP
-  CEFAZOLIN: SIGNIFICANT CHANGE UP
-  CEFEPIME: SIGNIFICANT CHANGE UP
-  CEFOXITIN: SIGNIFICANT CHANGE UP
-  CEFTRIAXONE: SIGNIFICANT CHANGE UP
-  CIPROFLOXACIN: SIGNIFICANT CHANGE UP
-  ERTAPENEM: SIGNIFICANT CHANGE UP
-  GENTAMICIN: SIGNIFICANT CHANGE UP
-  IMIPENEM: SIGNIFICANT CHANGE UP
-  LEVOFLOXACIN: SIGNIFICANT CHANGE UP
-  MEROPENEM: SIGNIFICANT CHANGE UP
-  PIPERACILLIN/TAZOBACTAM: SIGNIFICANT CHANGE UP
-  TOBRAMYCIN: SIGNIFICANT CHANGE UP
-  TRIMETHOPRIM/SULFAMETHOXAZOLE: SIGNIFICANT CHANGE UP
CULTURE RESULTS: ABNORMAL
CULTURE RESULTS: SIGNIFICANT CHANGE UP
ERYTHROCYTE [SEDIMENTATION RATE] IN BLOOD: 83 MM/HR — HIGH (ref 0–20)
FLUAV AG NPH QL: SIGNIFICANT CHANGE UP
FLUBV AG NPH QL: SIGNIFICANT CHANGE UP
GLUCOSE BLDC GLUCOMTR-MCNC: 111 MG/DL — HIGH (ref 70–99)
GLUCOSE BLDC GLUCOMTR-MCNC: 283 MG/DL — HIGH (ref 70–99)
GRAM STN FLD: ABNORMAL
METHOD TYPE: SIGNIFICANT CHANGE UP
ORGANISM # SPEC MICROSCOPIC CNT: ABNORMAL
ORGANISM # SPEC MICROSCOPIC CNT: ABNORMAL
ORGANISM # SPEC MICROSCOPIC CNT: SIGNIFICANT CHANGE UP
SARS-COV-2 RNA SPEC QL NAA+PROBE: SIGNIFICANT CHANGE UP
SPECIMEN SOURCE: SIGNIFICANT CHANGE UP
SPECIMEN SOURCE: SIGNIFICANT CHANGE UP

## 2024-02-22 PROCEDURE — 99239 HOSP IP/OBS DSCHRG MGMT >30: CPT

## 2024-02-22 RX ORDER — METHIMAZOLE 10 MG/1
1 TABLET ORAL
Qty: 0 | Refills: 0 | DISCHARGE
Start: 2024-02-22

## 2024-02-22 RX ORDER — LEVOTHYROXINE SODIUM 125 MCG
100 TABLET ORAL
Refills: 0 | DISCHARGE

## 2024-02-22 RX ORDER — CIPROFLOXACIN LACTATE 400MG/40ML
1 VIAL (ML) INTRAVENOUS
Qty: 10 | Refills: 0
Start: 2024-02-22 | End: 2024-02-26

## 2024-02-22 RX ADMIN — LOSARTAN POTASSIUM 50 MILLIGRAM(S): 100 TABLET, FILM COATED ORAL at 05:05

## 2024-02-22 RX ADMIN — HEPARIN SODIUM 5000 UNIT(S): 5000 INJECTION INTRAVENOUS; SUBCUTANEOUS at 05:05

## 2024-02-22 RX ADMIN — CEFTRIAXONE 100 MILLIGRAM(S): 500 INJECTION, POWDER, FOR SOLUTION INTRAMUSCULAR; INTRAVENOUS at 01:01

## 2024-02-22 RX ADMIN — Medication 6: at 12:09

## 2024-02-22 NOTE — DISCHARGE NOTE PROVIDER - CARE PROVIDER_API CALL
Wilberto Lehman  Orthopaedic Surgery  [address] [place] [address]  Phone: [phone]  Fax: [phone]  Follow Up Time:     Semaj Ocampo)  Neurology  [address] [facility], [address]  [place] [address]  Phone: [phone]  Fax: [phone]  Follow Up Time:     Arnav Mtz  Endocrinology/Metab/Diabetes  [address] [facility], [address]  [place] [address]  Phone: [phone]  Fax: [phone]  Follow Up Time:

## 2024-02-22 NOTE — DISCHARGE NOTE NURSING/CASE MANAGEMENT/SOCIAL WORK - NSDCPEFALRISK_GEN_ALL_CORE
For information on Fall & Injury Prevention, visit: [email or web address] OR  [email or web address] OR  https://www.cdc.gov/steadi/patient.html

## 2024-02-22 NOTE — PROGRESS NOTE ADULT - SUBJECTIVE AND OBJECTIVE BOX
72 yo female w/ history of HTN, diabetes, acquired hypothyroidism presented to [facility] for generalized weakness and was admitted w/ sepsis POA due to UTI. She is lying in bed in NAD.     MEDICATIONS  (STANDING):  atorvastatin 10 milliGRAM(s) Oral at bedtime  cefTRIAXone   IVPB 1000 milliGRAM(s) IV Intermittent every 24 hours  dextrose 5%. 1000 milliLiter(s) (100 mL/Hr) IV Continuous <Continuous>  dextrose 5%. 1000 milliLiter(s) (50 mL/Hr) IV Continuous <Continuous>  dextrose 50% Injectable 25 Gram(s) IV Push once  dextrose 50% Injectable 12.5 Gram(s) IV Push once  dextrose 50% Injectable 25 Gram(s) IV Push once  glucagon  Injectable 1 milliGRAM(s) IntraMuscular once  heparin   Injectable 5000 Unit(s) SubCutaneous every 12 hours  insulin glargine Injectable (LANTUS) 15 Unit(s) SubCutaneous at bedtime  insulin lispro (ADMELOG) corrective regimen sliding scale   SubCutaneous three times a day before meals  losartan 50 milliGRAM(s) Oral daily  potassium phosphate / sodium phosphate Powder (PHOS-NaK) 2 Packet(s) Oral four times a day with meals    MEDICATIONS  (PRN):  acetaminophen     Tablet .. 650 milliGRAM(s) Oral every 8 hours PRN Temp greater or equal to 38C (100.4F), Mild Pain (1 - 3)  dextrose Oral Gel 15 Gram(s) Oral once PRN Blood Glucose LESS THAN 70 milliGRAM(s)/deciliter      Allergies    No Known Allergies    Intolerances        Vital Signs Last 24 Hrs  T(C): 36.4 (20 Feb 2024 17:00), Max: 36.8 (20 Feb 2024 11:24)  T(F): 97.6 (20 Feb 2024 17:00), Max: 98.3 (20 Feb 2024 11:24)  HR: 80 (20 Feb 2024 17:00) (80 - 96)  BP: 132/81 (20 Feb 2024 17:00) (132/78 - 158/83)  BP(mean): --  RR: 18 (20 Feb 2024 17:00) (16 - 18)  SpO2: 98% (20 Feb 2024 17:00) (96% - 100%)    Parameters below as of 20 Feb 2024 15:40  Patient On (Oxygen Delivery Method): room air        PHYSICAL EXAM:  GENERAL: NAD, well-groomed, well-developed  HEAD:  Atraumatic, Normocephalic  EYES: EOMI, PERRLA   NECK: Supple   NERVOUS SYSTEM:  Alert    CHEST/LUNG: Clear to auscultation bilaterally; No rales, rhonchi, wheezing, or rubs  HEART: Regular rate and rhythm; No murmurs, rubs, or gallops  ABDOMEN: Soft, Nontender, Nondistended; Bowel sounds present  EXTREMITIES:  No clubbing, cyanosis, or edema    LABS:                        8.6    12.73 )-----------( 267      ( 20 Feb 2024 06:17 )             26.0     02-20    141  |  109<H>  |  29<H>  ----------------------------<  108<H>  3.1<L>   |  23  |  1.17    Ca    8.2<L>      20 Feb 2024 06:17  Phos  1.5     02-20  Mg     1.4     02-20    TPro  6.2  /  Alb  1.8<L>  /  TBili  0.5  /  DBili  x   /  AST  66<H>  /  ALT  62  /  AlkPhos  58  02-20      Urinalysis Basic - ( 20 Feb 2024 06:17 )    Color: x / Appearance: x / SG: x / pH: x  Gluc: 108 mg/dL / Ketone: x  / Bili: x / Urobili: x   Blood: x / Protein: x / Nitrite: x   Leuk Esterase: x / RBC: x / WBC x   Sq Epi: x / Non Sq Epi: x / Bacteria: x      CAPILLARY BLOOD GLUCOSE      POCT Blood Glucose.: 188 mg/dL (20 Feb 2024 16:50)  POCT Blood Glucose.: 243 mg/dL (20 Feb 2024 11:00)  POCT Blood Glucose.: 114 mg/dL (20 Feb 2024 07:25)  POCT Blood Glucose.: 170 mg/dL (19 Feb 2024 21:07)      Culture - Urine (collected 17 Feb 2024 15:24)  Source: Clean Catch Clean Catch (Midstream)  Final Report (20 Feb 2024 09:08):    >100,000 CFU/ml Escherichia coli    >100,000 CFU/ml Klebsiella variicola  Organism: Escherichia coli  Klebsiella variicola (20 Feb 2024 09:08)  Organism: Klebsiella variicola (20 Feb 2024 09:08)  Organism: Escherichia coli (20 Feb 2024 09:08)    Culture - Blood (collected 17 Feb 2024 13:55)  Source: .Blood Blood-Peripheral  Preliminary Report (20 Feb 2024 19:02):    No growth at 72 Hours    Culture - Blood (collected 17 Feb 2024 13:55)  Source: .Blood Blood-Peripheral  Gram Stain (prelim) (19 Feb 2024 17:55):    Growth in aerobic bottle: Gram Negative Rods  Preliminary Report (20 Feb 2024 11:23):    Growth in aerobic bottle: Klebsiella variicola    Direct identification is available within approximately 3-5    hours either by Blood Panel Multiplexed PCR or Direct    MALDI-TOF. Details: [email or web address]  Organism: Blood Culture PCR (19 Feb 2024 21:21)  Organism: Blood Culture PCR (19 Feb 2024 21:21)      RADIOLOGY & ADDITIONAL TESTS:    02-20-24 @ 07:01  -  02-20-24 @ 20:43  --------------------------------------------------------  IN:    Oral Fluid: 420 mL  Total IN: 420 mL    OUT:    Voided (mL): 550 mL  Total OUT: 550 mL    Total NET: -130 mL      
Patient is a 73y old  Female who presents with a chief complaint of Weakness, dehydration, possible UTI (2024 16:59)    INTERVAL HPI/OVERNIGHT EVENTS: no acute events  SUBJECTIVE: reports feeling a lot better    MEDICATIONS  (STANDING):  cefTRIAXone   IVPB 1000 milliGRAM(s) IV Intermittent every 24 hours  dextrose 5%. 1000 milliLiter(s) (100 mL/Hr) IV Continuous <Continuous>  dextrose 5%. 1000 milliLiter(s) (50 mL/Hr) IV Continuous <Continuous>  dextrose 50% Injectable 25 Gram(s) IV Push once  dextrose 50% Injectable 25 Gram(s) IV Push once  dextrose 50% Injectable 12.5 Gram(s) IV Push once  glucagon  Injectable 1 milliGRAM(s) IntraMuscular once  heparin   Injectable 5000 Unit(s) SubCutaneous every 12 hours  insulin glargine Injectable (LANTUS) 15 Unit(s) SubCutaneous at bedtime  insulin lispro (ADMELOG) corrective regimen sliding scale   SubCutaneous three times a day before meals  sodium chloride 0.9%. 1000 milliLiter(s) (75 mL/Hr) IV Continuous <Continuous>    MEDICATIONS  (PRN):  acetaminophen     Tablet .. 650 milliGRAM(s) Oral every 8 hours PRN Temp greater or equal to 38C (100.4F), Mild Pain (1 - 3)  dextrose Oral Gel 15 Gram(s) Oral once PRN Blood Glucose LESS THAN 70 milliGRAM(s)/deciliter    Allergies    No Known Allergies    Intolerances      REVIEW OF SYSTEMS:  All other systems reviewed and are negative    Vital Signs Last 24 Hrs  T(C): 36.3 (2024 11:04), Max: 40.2 (2024 23:15)  T(F): 97.4 (2024 11:04), Max: 104.4 (2024 23:15)  HR: 87 (2024 11:04) (87 - 136)  BP: 108/65 (2024 11:04) (108/65 - 148/86)  BP(mean): 73 (2024 15:13) (73 - 73)  RR: 18 (2024 11:04) (18 - 27)  SpO2: 97% (2024 11:04) (94% - 98%)    Parameters below as of 2024 11:04  Patient On (Oxygen Delivery Method): room air      Daily Height in cm: 162.56 (2024 13:15)    Daily Weight in k.8 (2024 04:44)  I&O's Summary    2024 07:01  -  2024 07:00  --------------------------------------------------------  IN: 0 mL / OUT: 250 mL / NET: -250 mL      CAPILLARY BLOOD GLUCOSE      POCT Blood Glucose.: 205 mg/dL (2024 11:45)  POCT Blood Glucose.: 162 mg/dL (2024 07:51)  POCT Blood Glucose.: 149 mg/dL (2024 22:30)  POCT Blood Glucose.: 118 mg/dL (2024 21:13)  POCT Blood Glucose.: 208 mg/dL (2024 18:26)    PHYSICAL EXAM:  GENERAL: NAD, well-groomed, well-developed  HEAD:  Atraumatic, Normocephalic  NECK: Supple, No JVD, Normal thyroid  NERVOUS SYSTEM:  Alert & Oriented X4, excellent concentration  CHEST/LUNG: Clear to percussion bilaterally; No rales, rhonchi, wheezing, or rubs  HEART: Regular rate and rhythm; No murmurs, rubs, or gallops  ABDOMEN: Soft, Nontender, Nondistended; Bowel sounds present  EXTREMITIES:  2+ Peripheral Pulses, No clubbing, cyanosis, or edema  LYMPH: No lymphadenopathy noted  SKIN: No rashes or lesions    Labs                          8.9    16.76 )-----------( 201      ( 2024 06:31 )             26.1     02-18    138  |  109<H>  |  60<H>  ----------------------------<  172<H>  3.3<L>   |  22  |  2.22<H>    Ca    8.4<L>      2024 06:31    TPro  7.8  /  Alb  2.6<L>  /  TBili  1.3<H>  /  DBili  x   /  AST  84<H>  /  ALT  82<H>  /  AlkPhos  63  02-17    PT/INR - ( 2024 13:55 )   PT: 11.9 sec;   INR: 0.99 ratio         PTT - ( 2024 13:55 )  PTT:30.1 sec      Urinalysis Basic - ( 2024 06:31 )    Color: x / Appearance: x / SG: x / pH: x  Gluc: 172 mg/dL / Ketone: x  / Bili: x / Urobili: x   Blood: x / Protein: x / Nitrite: x   Leuk Esterase: x / RBC: x / WBC x   Sq Epi: x / Non Sq Epi: x / Bacteria: x                
Patient seen and examined this am. No new events    MEDICATIONS:    acetaminophen     Tablet .. 650 milliGRAM(s) Oral every 8 hours PRN  atorvastatin 10 milliGRAM(s) Oral at bedtime  cefTRIAXone   IVPB 1000 milliGRAM(s) IV Intermittent every 24 hours  dextrose 5%. 1000 milliLiter(s) IV Continuous <Continuous>  dextrose 5%. 1000 milliLiter(s) IV Continuous <Continuous>  dextrose 50% Injectable 25 Gram(s) IV Push once  dextrose 50% Injectable 12.5 Gram(s) IV Push once  dextrose 50% Injectable 25 Gram(s) IV Push once  dextrose Oral Gel 15 Gram(s) Oral once PRN  glucagon  Injectable 1 milliGRAM(s) IntraMuscular once  heparin   Injectable 5000 Unit(s) SubCutaneous every 12 hours  insulin glargine Injectable (LANTUS) 15 Unit(s) SubCutaneous at bedtime  insulin lispro (ADMELOG) corrective regimen sliding scale   SubCutaneous three times a day before meals  losartan 50 milliGRAM(s) Oral daily  methimazole 5 milliGRAM(s) Oral <User Schedule>      LABS:                          8.8    11.50 )-----------( 296      ( 21 Feb 2024 05:40 )             26.7     02-21    140  |  111<H>  |  20  ----------------------------<  103<H>  4.6   |  25  |  1.00    Ca    8.3<L>      21 Feb 2024 05:40  Phos  2.4     02-21  Mg     1.9     02-21    TPro  6.4  /  Alb  1.7<L>  /  TBili  0.6  /  DBili  x   /  AST  69<H>  /  ALT  64  /  AlkPhos  68  02-21    CAPILLARY BLOOD GLUCOSE      POCT Blood Glucose.: 111 mg/dL (22 Feb 2024 07:43)  POCT Blood Glucose.: 143 mg/dL (21 Feb 2024 21:26)  POCT Blood Glucose.: 154 mg/dL (21 Feb 2024 16:21)  POCT Blood Glucose.: 208 mg/dL (21 Feb 2024 11:05)      Urinalysis Basic - ( 21 Feb 2024 05:40 )    Color: x / Appearance: x / SG: x / pH: x  Gluc: 103 mg/dL / Ketone: x  / Bili: x / Urobili: x   Blood: x / Protein: x / Nitrite: x   Leuk Esterase: x / RBC: x / WBC x   Sq Epi: x / Non Sq Epi: x / Bacteria: x      I&O's Summary    21 Feb 2024 07:01  -  22 Feb 2024 07:00  --------------------------------------------------------  IN: 440 mL / OUT: 500 mL / NET: -60 mL      Vital Signs Last 24 Hrs  T(C): 36.8 (22 Feb 2024 05:02), Max: 37.1 (21 Feb 2024 17:14)  T(F): 98.2 (22 Feb 2024 05:02), Max: 98.8 (21 Feb 2024 17:14)  HR: 71 (22 Feb 2024 05:02) (71 - 81)  BP: 149/81 (22 Feb 2024 05:02) (139/82 - 149/81)  BP(mean): --  RR: 17 (22 Feb 2024 05:02) (15 - 17)  SpO2: 96% (22 Feb 2024 05:02) (96% - 97%)    Parameters below as of 22 Feb 2024 05:02  Patient On (Oxygen Delivery Method): room air          On Neurological Examination:    Mental Status - Patient is alert, awake, oriented X3. fluent, names, no dysarthria no aphasia Follows commands well and able to answer questions appropriately. Mood and affect  normal    Cranial Nerves - PERRL, EOMI, VFF, V1-V3 intact, no gross facial asymmetry, tongue/uvula midline    Motor Exam -   4+ to 5 throughout left foot drop      Sensory: intact to light touch and pinprick bilaterally    Coord: FTN intact bilaterally     Gait -  unable to ambulate w/o wlaker    Reflexes:          1+ throughout                                               GENERAL Exam:     Nontoxic , No Acute Distress   	  HEENT:  normocephalic, atraumatic  		  LUNGS:	Clear bilaterally  No Wheeze    	  HEART:	 Normal S1S2   No murmur RRR        	  GI/ ABDOMEN:  Soft  Non tender    EXTREMITIES:   No Edema  No Clubbing  No Cyanosis No Edema    MUSCULOSKELETAL: Normal Range of Motion  	   SKIN:      Normal   No Ecchymosis               LABS:  CBC Full  -  ( 20 Feb 2024 06:17 )  WBC Count : 12.73 K/uL  RBC Count : 2.89 M/uL  Hemoglobin : 8.6 g/dL  Hematocrit : 26.0 %  Platelet Count - Automated : 267 K/uL  Mean Cell Volume : 90.0 fl  Mean Cell Hemoglobin : 29.8 pg  Mean Cell Hemoglobin Concentration : 33.1 g/dL  Auto Neutrophil # : x  Auto Lymphocyte # : x  Auto Monocyte # : x  Auto Eosinophil # : x  Auto Basophil # : x  Auto Neutrophil % : x  Auto Lymphocyte % : x  Auto Monocyte % : x  Auto Eosinophil % : x  Auto Basophil % : x    Urinalysis Basic - ( 20 Feb 2024 06:17 )    Color: x / Appearance: x / SG: x / pH: x  Gluc: 108 mg/dL / Ketone: x  / Bili: x / Urobili: x   Blood: x / Protein: x / Nitrite: x   Leuk Esterase: x / RBC: x / WBC x   Sq Epi: x / Non Sq Epi: x / Bacteria: x      02-20    141  |  109<H>  |  29<H>  ----------------------------<  108<H>  3.1<L>   |  23  |  1.17    Ca    8.2<L>      20 Feb 2024 06:17  Phos  1.5     02-20  Mg     1.4     02-20    TPro  6.2  /  Alb  1.8<L>  /  TBili  0.5  /  DBili  x   /  AST  66<H>  /  ALT  62  /  AlkPhos  58  02-20    LIVER FUNCTIONS - ( 20 Feb 2024 06:17 )  Alb: 1.8 g/dL / Pro: 6.2 gm/dL / ALK PHOS: 58 U/L / ALT: 62 U/L / AST: 66 U/L / GGT: x           Hemoglobin A1C:             RADIOLOGY  < from: CT Head No Cont (02.17.24 @ 17:57) >  IMPRESSION:    -No acute intracranial hemorrhage.    -Extensive white matter small vessel changes.    -Mild ventriculomegaly with secondary findings suggestive of normal   pressure hydrocephalus.    --- End of Report ---      < end of copied text >          < from: MR Cervical Spine No Cont (02.21.24 @ 16:26) >  MRI CERVICAL SPINE  1. Straightening of lordosis can be seen in the setting of muscle spasm.  2. Severe C4-C5, C5-C6 and C6-C7 degenerative disc disease and multilevel   moderate-severe facet arthrosis.  3. C4-C5 central-left paracentral disc protrusion superimposed upon a   disc bulge-spondylitic ridge complex, impinging upon the ventral cord,   resulting in moderate-severe central canal and severe bilateral neural   foramen stenosis with uncovertebral spurring and facet arthrosis.  4. C5-C6 disc bulge-spondylitic ridge complex, on the ventral cord,   resulting in moderate-severe central canal and severe bilateral neural   foramen stenosis with uncovertebral spurring and facet arthrosis.  5. C6-C7 right foraminal disc protrusion superimposed upon a disc   bulge-spondylitic ridge complex, impinging upon the ventral cord,   resulting in mild central canal, severe right and moderate left neural   foramen stenosis with uncovertebral spurring and facet arthrosis.  6. No obvious abnormal cord signal, however suboptimally evaluated   secondary to motion artifact.  7. Additional findings, described in detail above.    MRI THORACIC SPINE  1. Grade 1 retrolisthesis T11 over T12. No compression fracture.  2. T11-T12 retrolisthesis with a central-left paracentral disc protrusion   superimposed upon a disc bulge, impinging upon the ventral cord,   resulting in mild central canal stenosis facet arthrosis.  3. T12-L1 left foraminal disc protrusion superimposed upon a disc bulge,   resulting in mild left neural foramen stenosis with facet arthrosis.  4. Additional findings described in detail above, including a question of   small bilateral pleural effusions.    --- End of Report ---    < end of copied text >  < from: MR Lumbar Spine No Cont (02.21.24 @ 11:16) >  IMPRESSION: Degenerative spondylosis as described above with severe   spinal stenosis L4-5 due to accommodation of a degenerative facet change   and anterolisthesis.    --- End of Report ---    < end of copied text >  < from: MR Head No Cont (02.21.24 @ 11:16) >    1.  No evidence of acute infarct or midline shift.  2.  Chronic ischemic changes.  3.  Ventricles are prominent when compared to the sulci. This is likely   secondary to a ex vacuo dilatation appearance secondary to parenchymal   volume loss. A underlying or concordant normal pressure hydrocephalus   could present similarly. Recommend clinical correlation.    --- End of Report ---        < end of copied text >            
74 yo female w/ history of HTN, diabetes, acquired hypothyroidism presented to [facility] for generalized weakness and was admitted w/ sepsis POA due to UTI. She is lying in bed in NAD.      MEDICATIONS  (STANDING):  atorvastatin 10 milliGRAM(s) Oral at bedtime  cefTRIAXone   IVPB 1000 milliGRAM(s) IV Intermittent every 24 hours  dextrose 5%. 1000 milliLiter(s) (100 mL/Hr) IV Continuous <Continuous>  dextrose 5%. 1000 milliLiter(s) (50 mL/Hr) IV Continuous <Continuous>  dextrose 50% Injectable 25 Gram(s) IV Push once  dextrose 50% Injectable 12.5 Gram(s) IV Push once  dextrose 50% Injectable 25 Gram(s) IV Push once  glucagon  Injectable 1 milliGRAM(s) IntraMuscular once  heparin   Injectable 5000 Unit(s) SubCutaneous every 12 hours  insulin glargine Injectable (LANTUS) 15 Unit(s) SubCutaneous at bedtime  insulin lispro (ADMELOG) corrective regimen sliding scale   SubCutaneous three times a day before meals  levothyroxine 100 MICROGram(s) Oral daily  potassium chloride    Tablet ER 40 milliEquivalent(s) Oral every 4 hours  sodium chloride 0.9%. 1000 milliLiter(s) (75 mL/Hr) IV Continuous <Continuous>    MEDICATIONS  (PRN):  acetaminophen     Tablet .. 650 milliGRAM(s) Oral every 8 hours PRN Temp greater or equal to 38C (100.4F), Mild Pain (1 - 3)  dextrose Oral Gel 15 Gram(s) Oral once PRN Blood Glucose LESS THAN 70 milliGRAM(s)/deciliter      Allergies    No Known Allergies    Intolerances        Vital Signs Last 24 Hrs  T(C): 37.1 (19 Feb 2024 11:22), Max: 37.1 (19 Feb 2024 11:22)  T(F): 98.7 (19 Feb 2024 11:22), Max: 98.7 (19 Feb 2024 11:22)  HR: 93 (19 Feb 2024 11:22) (81 - 98)  BP: 147/72 (19 Feb 2024 11:22) (123/72 - 147/72)   RR: 16 (19 Feb 2024 11:22) (16 - 18)  SpO2: 97% (19 Feb 2024 11:22) (95% - 97%)    Parameters below as of 19 Feb 2024 10:37  Patient On (Oxygen Delivery Method): room air        PHYSICAL EXAM:  GENERAL: NAD, well-groomed, well-developed  HEAD:  Atraumatic, Normocephalic  EYES: EOMI, PERRLA   NECK: Supple   NERVOUS SYSTEM:  Alert    CHEST/LUNG: Clear to auscultation bilaterally; No rales, rhonchi, wheezing, or rubs  HEART: Regular rate and rhythm; No murmurs, rubs, or gallops  ABDOMEN: Soft, Nontender, Nondistended; Bowel sounds present  EXTREMITIES:  No clubbing, cyanosis, or edema       LABS:                        9.7    14.73 )-----------( 225      ( 19 Feb 2024 07:09 )             28.9     02-19    140  |  112<H>  |  45<H>  ----------------------------<  116<H>  3.4<L>   |  21<L>  |  1.17    Ca    8.7      19 Feb 2024 07:09  Phos  2.7     02-19  Mg     1.8     02-19    TPro  6.3  /  Alb  1.9<L>  /  TBili  0.6  /  DBili  x   /  AST  83<H>  /  ALT  71  /  AlkPhos  62  02-19    PT/INR - ( 17 Feb 2024 13:55 )   PT: 11.9 sec;   INR: 0.99 ratio         PTT - ( 17 Feb 2024 13:55 )  PTT:30.1 sec  Urinalysis Basic - ( 19 Feb 2024 07:09 )    Color: x / Appearance: x / SG: x / pH: x  Gluc: 116 mg/dL / Ketone: x  / Bili: x / Urobili: x   Blood: x / Protein: x / Nitrite: x   Leuk Esterase: x / RBC: x / WBC x   Sq Epi: x / Non Sq Epi: x / Bacteria: x      CAPILLARY BLOOD GLUCOSE      POCT Blood Glucose.: 105 mg/dL (19 Feb 2024 08:02)  POCT Blood Glucose.: 154 mg/dL (18 Feb 2024 21:34)  POCT Blood Glucose.: 126 mg/dL (18 Feb 2024 16:41)  POCT Blood Glucose.: 205 mg/dL (18 Feb 2024 11:45)      Culture - Urine (collected 17 Feb 2024 15:24)  Source: Clean Catch Clean Catch (Midstream)  Preliminary Report (19 Feb 2024 09:18):    >100,000 CFU/ml Escherichia coli    >100,000 CFU/ml Klebsiella variicola    Culture - Blood (collected 17 Feb 2024 13:55)  Source: .Blood Blood-Peripheral  Preliminary Report (18 Feb 2024 19:02):    No growth at 24 hours    Culture - Blood (collected 17 Feb 2024 13:55)  Source: .Blood Blood-Peripheral  Preliminary Report (18 Feb 2024 19:02):    No growth at 24 hours      RADIOLOGY & ADDITIONAL TESTS:    02-18-24 @ 07:01  -  02-19-24 @ 07:00  --------------------------------------------------------  IN:    Oral Fluid: 400 mL  Total IN: 400 mL    OUT:    Voided (mL): 1750 mL  Total OUT: 1750 mL    Total NET: -1350 mL      
Patient is a 73y old  Female who presents with a chief complaint of Weakness, dehydration, possible UTI (2024 20:43)    INTERVAL HPI/OVERNIGHT EVENTS:    MEDICATIONS  (STANDING):  atorvastatin 10 milliGRAM(s) Oral at bedtime  cefTRIAXone   IVPB 1000 milliGRAM(s) IV Intermittent every 24 hours  dextrose 5%. 1000 milliLiter(s) (100 mL/Hr) IV Continuous <Continuous>  dextrose 5%. 1000 milliLiter(s) (50 mL/Hr) IV Continuous <Continuous>  dextrose 50% Injectable 25 Gram(s) IV Push once  dextrose 50% Injectable 25 Gram(s) IV Push once  dextrose 50% Injectable 12.5 Gram(s) IV Push once  glucagon  Injectable 1 milliGRAM(s) IntraMuscular once  heparin   Injectable 5000 Unit(s) SubCutaneous every 12 hours  insulin glargine Injectable (LANTUS) 15 Unit(s) SubCutaneous at bedtime  insulin lispro (ADMELOG) corrective regimen sliding scale   SubCutaneous three times a day before meals  losartan 50 milliGRAM(s) Oral daily  potassium phosphate / sodium phosphate Powder (PHOS-NaK) 2 Packet(s) Oral four times a day with meals    MEDICATIONS  (PRN):  acetaminophen     Tablet .. 650 milliGRAM(s) Oral every 8 hours PRN Temp greater or equal to 38C (100.4F), Mild Pain (1 - 3)  dextrose Oral Gel 15 Gram(s) Oral once PRN Blood Glucose LESS THAN 70 milliGRAM(s)/deciliter    Allergies    No Known Allergies    Intolerances      REVIEW OF SYSTEMS:  All other systems reviewed and are negative    Vital Signs Last 24 Hrs  T(C): 36.7 (2024 05:00), Max: 37.1 (2024 23:16)  T(F): 98 (2024 05:00), Max: 98.7 (2024 23:16)  HR: 82 (2024 05:00) (80 - 96)  BP: 133/84 (2024 05:00) (119/68 - 145/81)  BP(mean): --  RR: 18 (2024 05:00) (17 - 19)  SpO2: 97% (2024 05:00) (97% - 100%)    Parameters below as of 2024 05:00  Patient On (Oxygen Delivery Method): room air      Daily     Daily Weight in k.2 (2024 05:00)  I&O's Summary    2024 07:01  -  2024 07:00  --------------------------------------------------------  IN: 420 mL / OUT: 550 mL / NET: -130 mL      CAPILLARY BLOOD GLUCOSE      POCT Blood Glucose.: 115 mg/dL (2024 08:09)  POCT Blood Glucose.: 144 mg/dL (2024 21:36)  POCT Blood Glucose.: 188 mg/dL (2024 16:50)  POCT Blood Glucose.: 243 mg/dL (2024 11:00)    PHYSICAL EXAM:  GENERAL: NAD,    HEAD:  Atraumatic, Normocephalic  EYES: EOMI, PERRLA, conjunctiva and sclera clear  ENMT: No tonsillar erythema, exudates, or enlargement; Moist mucous membranes, Good dentition, No lesions  NECK: Supple, No JVD, Normal thyroid  NERVOUS SYSTEM:  Alert & Oriented X3, Good concentration; Motor Strength 5/5 B/L upper and lower extremities; DTRs 2+ intact and symmetric  CHEST/LUNG: Clear to percussion bilaterally; No rales, rhonchi, wheezing, or rubs  HEART: Regular rate and rhythm; No murmurs, rubs, or gallops  ABDOMEN: Soft, Nontender, Nondistended; Bowel sounds present  EXTREMITIES:  2+ Peripheral Pulses, No clubbing, cyanosis, or edema  LYMPH: No lymphadenopathy noted  SKIN: No rashes or lesions    Labs                          8.8    11.50 )-----------( 296      ( 2024 05:40 )             26.7     02-21    140  |  111<H>  |  20  ----------------------------<  103<H>  4.6   |  25  |  1.00    Ca    8.3<L>      2024 05:40  Phos  2.4     02-  Mg     1.9     02-    TPro  6.4  /  Alb  1.7<L>  /  TBili  0.6  /  DBili  x   /  AST  69<H>  /  ALT  64  /  AlkPhos  68            Urinalysis Basic - ( 2024 05:40 )    Color: x / Appearance: x / SG: x / pH: x  Gluc: 103 mg/dL / Ketone: x  / Bili: x / Urobili: x   Blood: x / Protein: x / Nitrite: x   Leuk Esterase: x / RBC: x / WBC x   Sq Epi: x / Non Sq Epi: x / Bacteria: x                  DVT prophylaxis: > Lovenox 40mg SQ daily  > Heparin   > SCD's
Patient is a 73y old  Female who presents with a chief complaint of Weakness, dehydration, possible UTI (22 Feb 2024 10:13)      Interval History: Continued on low-dose methimazole.  Patient's ESR is 80 raising the possibility of thyroiditis superimposed on hypothyroidism and transiently giving her hyperthyroidism  Patient discharged on low-dose methimazole earlier today    MEDICATIONS  (STANDING):  atorvastatin 10 milliGRAM(s) Oral at bedtime  cefTRIAXone   IVPB 1000 milliGRAM(s) IV Intermittent every 24 hours  dextrose 5%. 1000 milliLiter(s) (50 mL/Hr) IV Continuous <Continuous>  dextrose 5%. 1000 milliLiter(s) (100 mL/Hr) IV Continuous <Continuous>  dextrose 50% Injectable 25 Gram(s) IV Push once  dextrose 50% Injectable 25 Gram(s) IV Push once  dextrose 50% Injectable 12.5 Gram(s) IV Push once  glucagon  Injectable 1 milliGRAM(s) IntraMuscular once  heparin   Injectable 5000 Unit(s) SubCutaneous every 12 hours  insulin glargine Injectable (LANTUS) 15 Unit(s) SubCutaneous at bedtime  insulin lispro (ADMELOG) corrective regimen sliding scale   SubCutaneous three times a day before meals  losartan 50 milliGRAM(s) Oral daily  methimazole 5 milliGRAM(s) Oral <User Schedule>    MEDICATIONS  (PRN):  acetaminophen     Tablet .. 650 milliGRAM(s) Oral every 8 hours PRN Temp greater or equal to 38C (100.4F), Mild Pain (1 - 3)  dextrose Oral Gel 15 Gram(s) Oral once PRN Blood Glucose LESS THAN 70 milliGRAM(s)/deciliter      Allergies    No Known Allergies    Intolerances        REVIEW OF SYSTEMS:  CONSTITUTIONAL: no changes  EYES: No eye pain, visual disturbances, or discharge  ENMT:  No difficulty hearing, No sinus or throat pain  NECK: No pain or stiffness  RESPIRATORY: No cough, wheezing, chills or hemoptysis; No shortness of breath  CARDIOVASCULAR: No chest pain, palpitations or leg swelling  GASTROINTESTINAL: No abdominal or epigastric pain. No nausea, vomiting, or hematemesis; No diarrhea or constipation. No melena or hematochezia.  GENITOURINARY: No dysuria, frequency, hematuria, or incontinence  NEUROLOGICAL: No headaches, memory loss, loss of strength, numbness, or tremors  SKIN: No itching, burning, rashes, or lesions   ENDOCRINE: No heat or cold intolerance; No hair loss  MUSCULOSKELETAL: No joint pain or swelling; No muscle, back, or extremity pain  PSYCHIATRIC: No depression, anxiety, mood swings, or difficulty sleeping  HEME/LYMPH: No easy bruising, or bleeding gums  ALLERY AND IMMUNOLOGIC: No hives or eczema    Vital Signs Last 24 Hrs  T(C): 37.1 (22 Feb 2024 12:05), Max: 37.1 (22 Feb 2024 12:05)  T(F): 98.7 (22 Feb 2024 12:05), Max: 98.7 (22 Feb 2024 12:05)  HR: 82 (22 Feb 2024 12:05) (71 - 82)  BP: 147/78 (22 Feb 2024 12:05) (139/82 - 149/81)  BP(mean): --  RR: 18 (22 Feb 2024 12:05) (16 - 18)  SpO2: 95% (22 Feb 2024 12:05) (95% - 97%)    Parameters below as of 22 Feb 2024 12:05  Patient On (Oxygen Delivery Method): room air        PHYSICAL EXAM:  GENERAL:   HEAD: Atraumatic, Normocephalic  EYES: PERRLA, conjunctiva and sclera clear  ENMT: No  exudates,; Moist mucous membranes,, No lesions  NECK: Supple, No JVD, Normal thyroid  NERVOUS SYSTEM:  Alert & Oriented,   CHEST/LUNG: Clear to auscultation bilaterally; No rales, rhonchi, wheezing, or rubs  HEART: Regular rate and rhythm; No murmurs, rubs, or gallops  ABDOMEN: Soft, Nontender, Nondistended; Bowel sounds present  EXTREMITIES:  2+ Peripheral Pulses, no edema  SKIN: No rashes or lesions    LABS:      Urinalysis Basic - ( 21 Feb 2024 05:40 )    Color: x / Appearance: x / SG: x / pH: x  Gluc: 103 mg/dL / Ketone: x  / Bili: x / Urobili: x   Blood: x / Protein: x / Nitrite: x   Leuk Esterase: x / RBC: x / WBC x   Sq Epi: x / Non Sq Epi: x / Bacteria: x      CAPILLARY BLOOD GLUCOSE      POCT Blood Glucose.: 283 mg/dL (22 Feb 2024 11:54)  POCT Blood Glucose.: 111 mg/dL (22 Feb 2024 07:43)  POCT Blood Glucose.: 143 mg/dL (21 Feb 2024 21:26)    Lipid panel:           Thyroid:  Diabetes Tests:  Parathyroid Panel:  Adrenals:  RADIOLOGY & ADDITIONAL TESTS:    Imaging Personally Reviewed:  [ ] YES  [ ] NO    Consultant(s) Notes Reviewed:  [ ] YES  [ ] NO    Care Discussed with Consultants/Other Providers [ ] YES  [ ] NO

## 2024-02-22 NOTE — DISCHARGE NOTE PROVIDER - DISCHARGE DATE
Chaka Bah is a 57 year old female presenting to the walk-in clinic today for a sore throat with weakness, headache, diarrhea with onset this am.     Pt is not Flu vaccinated, Pt is Covid vaccinated.     Rapid Covid, Rapid Flu and Strep PCR obtained.     Denies known Latex allergy or symptoms of Latex sensitivity.  Medications reviewed and updated.  Patient would like communication of their results via:        Cell Phone:   Telephone Information:   Mobile [phone]     Okay to leave a message containing results? Yes   22-Feb-2024

## 2024-02-22 NOTE — PROGRESS NOTE ADULT - REASON FOR ADMISSION
Weakness, dehydration, possible UTI

## 2024-02-22 NOTE — DISCHARGE NOTE PROVIDER - NSDCMRMEDTOKEN_GEN_ALL_CORE_FT
atorvastatin 10 mg oral tablet: 1 tab(s) orally once a day (at bedtime)  benzonatate 100 mg oral capsule: 1 cap(s) orally every 8 hours  ciprofloxacin 500 mg oral tablet: 1 tab(s) orally 2 times a day  gabapentin 300 mg oral capsule: 1 cap(s) orally 3 times a day  Janumet 50 mg-1000 mg oral tablet: 1 tab(s) orally 2 times a day  losartan 50 mg oral tablet: 1 tab(s) orally once a day  methIMAzole 5 mg oral tablet: 1 tab(s) orally 2 times a day

## 2024-02-22 NOTE — PROGRESS NOTE ADULT - PROBLEM SELECTOR PLAN 1
Since the hyperthyroidism is borne out thyroiditis, basically patient has hypothyroidism.  Check thyroid function test within a week or 2 after discharge and if patient's TSH is increasing, then methimazole should be stopped.  Otherwise low-dose can be continued for a few weeks more

## 2024-02-22 NOTE — DISCHARGE NOTE PROVIDER - NSDCCPCAREPLAN_GEN_ALL_CORE_FT
PRINCIPAL DISCHARGE DIAGNOSIS  Diagnosis: Acute UTI  Assessment and Plan of Treatment: complete antibiotics as prescribed  follow up with neurology  follow up with orthopedics for your spinal stenosis  follow up with endocrine , for treatment of hyperthyroid. repeat tsh/ft4/ft3 in 5-7days at rehab      SECONDARY DISCHARGE DIAGNOSES  Diagnosis: Dehydration  Assessment and Plan of Treatment:

## 2024-02-22 NOTE — PROGRESS NOTE ADULT - PROVIDER SPECIALTY LIST ADULT
Hospitalist
Physical Therapy     Referred by: Erika Miller DO; Medical Diagnosis (from order):    Diagnosis Information      Diagnosis    727.9 (ICD-9-CM) - M67.952 (ICD-10-CM) - Tendinopathy of left gluteus medius    719.45 (ICD-9-CM) - M25.559 (ICD-10-CM) - Hip pain    719.46 (ICD-9-CM) - M25.561, M25.562 (ICD-10-CM) - Pain in both knees, unspecified chronicity    728.89 (ICD-9-CM) - M76.31 (ICD-10-CM) - Iliotibial band syndrome of right side                Physical Therapy -  Daily Treatment Note    Visit:  4     SUBJECTIVE                                                                                                             No new complaints, but realizes when fatigued she experiences obvious changes in walking pattern.  Pain / Symptoms:  Pain rating (out of 10): Current: 3     OBJECTIVE                                                                                                                      Strength  (out of 5 unless noted, standard test position unless noted, lbs tested with hand held dynamometer)   Comments / Details: When assessing functional stability, patient demonstrates increased right lateral sway and lateral tilt during right single leg stance. Also demonstrates left lateral sway though diminished compared to right       Palpation:   Comments / Details: Multiple areas of tenderness over left gluteus medius, posterior aspect greater, and lateral aspect glute king      TREATMENT                                                                                                                  Therapeutic Exercise:  Reviewed present advised to continue as outlined.  *Today instructedCKC single leg stabilization using hands on counter top counter balance, focusing on maintaining a level pelvis and neutral alignment over stance leg.  Also utilized near for biofeedback allow for better patient understanding.  Patient does report she has walking sticks but has not used for long time.  Suggestion was made 
to bring them next visit and review possibly usage to allow for offloading lateral hips and allowing for realigning gait pattern    Manual Therapy:  Sideline soft tissue mobilization and tender point release over left lateral hip    Home Exercise Program: Clam shells.*  Self massage of ITB (foam roller/the stick).*  5/13- added standing piriformis stretching.*  On 5/17, modified to using seat pan and WB less on left LE.    Ultrasound (41208)  Location: Left lateral hip  Position: sidelying  Duty Cycle: 100%  Frequency: 1 Mhz  Intensity (w/cm2): 1.5  Intensity: patient comfort  Duration: 10 minutes  Results: decreased pain and improved circulation  Reaction: no adverse reaction to treatment      ASSESSMENT                                                                                                             Improved patient awareness and understanding how subtle gait deviations may be contributing to lateral hip complaints.  Secondary to DJD of bilateral knee joints must also be considered as part of her overall weight-bearing pain patterns.  Pain/symptoms after session (out of 10): 2    Patient Education:   Results of above outlined education: Verbalizes understanding, Demonstrates understanding and Needs reinforcement      PLAN                                                                                                                           Suggestions for next session as indicated: Progress per plan of care assess options of utilizing walking sticks for daily wellness walks.         Therapy procedure time and total treatment time can be found documented on the Time Entry flowsheet  
Endocrinology
Hospitalist
Hospitalist
Internal Medicine
Neurology

## 2024-02-22 NOTE — DISCHARGE NOTE NURSING/CASE MANAGEMENT/SOCIAL WORK - PATIENT PORTAL LINK FT
You can access the FollowMyHealth Patient Portal offered by [facility] by registering at the following website: [email or web address]. By joining [facility] ’s FollowMyHealth portal, you will also be able to view your health information using other applications (apps) compatible with our system.

## 2024-02-22 NOTE — DISCHARGE NOTE PROVIDER - HOSPITAL COURSE
74 yo female w/ history of HTN, diabetes, acquired hypothyroidism presented to [facility] for generalized weakness and was admitted w/ sepsis POA due to UTI.     UTI w sepsis poa, gram neg bacteremia   - change to po abx , pan sensitive   - urine culture growing E. coli and Klebsiella, sensitivities pending   -blood cultures with bacteremia, matching urine      hyperthyroid, seen by endo, start low dose methimazole   - TSH is <0.005 w/ free T4 is 3.7  - patient says she was taking synthroid w/ meds -  - will hold synthroid 100mcg         Hypokalemia/ Hypomagnesemia/ Hypophosphatemia  - replace w/ KCl, Mg and Phos    Mild ventriculomegaly with secondary findings suggestive of normal pressure hydrocephalus on CT  - neurology note read and appreciated patient has possible NPH foot drop -   mr brain stable, follow neuro as outpt  spinal stenosis seen by ortho no acute intervention f/u as outpt     DM II  - c/w Lantus & ISS  - Hgb A1C is 7.2    MICHELINE POA  - prerenal  - resolved      HTN  - resume home losartan     HLD  - c/w Lipitor    Prophylaxis:  DVT: Heparin  GI: PO diet    NOK: pt's , Pierre Fuller, and son on 2/18- [phone], [phone]. [phone].     Dispo: [facility]         pt seen and examined 45 min spent on dc planning     Lab test review, Radiology Review, Vitals review, Consultant review and discussion, Physical examination, IDR, Assessment and plan; Plan discussion with patient and family

## 2024-02-22 NOTE — DISCHARGE NOTE PROVIDER - PROVIDER TOKENS
PROVIDER:[TOKEN:[[id number]]],PROVIDER:[TOKEN:[[id number]]],PROVIDER:[TOKEN:[[id number]]]

## 2024-02-22 NOTE — PROGRESS NOTE ADULT - ASSESSMENT
73-year-old female PMH of hypertension, diabetes and acquired hypothyroidism here with UTI. chronic incontinence gait issues  PE generalized weakness left foot drop  CTH concerning for NPH, DESH  MRI Neuro axis reviewed, atrophy vs NPH degenerative chnages, spinal stenosis      Impression: possible NPH, L> R LE weakness poissble spinal stenosis, possible neuropathy      outpt EMG  Can follow up with Neurology, Dr. Semaj Ocampo at [phone], will reasess

## 2024-02-24 LAB — TSI ACT/NOR SER: <0.1 IU/L — SIGNIFICANT CHANGE UP (ref 0–0.55)

## 2024-02-28 DIAGNOSIS — E83.39 OTHER DISORDERS OF PHOSPHORUS METABOLISM: ICD-10-CM

## 2024-02-28 DIAGNOSIS — N39.0 URINARY TRACT INFECTION, SITE NOT SPECIFIED: ICD-10-CM

## 2024-02-28 DIAGNOSIS — A41.51 SEPSIS DUE TO ESCHERICHIA COLI [E. COLI]: ICD-10-CM

## 2024-02-28 DIAGNOSIS — Z79.899 OTHER LONG TERM (CURRENT) DRUG THERAPY: ICD-10-CM

## 2024-02-28 DIAGNOSIS — E86.0 DEHYDRATION: ICD-10-CM

## 2024-02-28 DIAGNOSIS — M21.372 FOOT DROP, LEFT FOOT: ICD-10-CM

## 2024-02-28 DIAGNOSIS — E87.6 HYPOKALEMIA: ICD-10-CM

## 2024-02-28 DIAGNOSIS — N17.9 ACUTE KIDNEY FAILURE, UNSPECIFIED: ICD-10-CM

## 2024-02-28 DIAGNOSIS — Z79.84 LONG TERM (CURRENT) USE OF ORAL HYPOGLYCEMIC DRUGS: ICD-10-CM

## 2024-02-28 DIAGNOSIS — E03.9 HYPOTHYROIDISM, UNSPECIFIED: ICD-10-CM

## 2024-02-28 DIAGNOSIS — I10 ESSENTIAL (PRIMARY) HYPERTENSION: ICD-10-CM

## 2024-02-28 DIAGNOSIS — E83.42 HYPOMAGNESEMIA: ICD-10-CM

## 2024-02-28 DIAGNOSIS — E11.9 TYPE 2 DIABETES MELLITUS WITHOUT COMPLICATIONS: ICD-10-CM

## 2024-02-28 DIAGNOSIS — E78.5 HYPERLIPIDEMIA, UNSPECIFIED: ICD-10-CM

## 2024-02-28 DIAGNOSIS — G91.2 (IDIOPATHIC) NORMAL PRESSURE HYDROCEPHALUS: ICD-10-CM

## 2024-03-22 ENCOUNTER — RX RENEWAL (OUTPATIENT)
Age: 74
End: 2024-03-22

## 2024-03-22 DIAGNOSIS — E03.9 HYPOTHYROIDISM, UNSPECIFIED: ICD-10-CM

## 2024-03-22 RX ORDER — LEVOTHYROXINE SODIUM 0.12 MG/1
125 TABLET ORAL
Qty: 90 | Refills: 3 | Status: ACTIVE | COMMUNITY
Start: 2024-03-22 | End: 1900-01-01

## 2024-05-13 NOTE — ED PROVIDER NOTE - ADMIT DISPOSITION PRESENT ON ADMISSION SEPSIS Q1 - RE-EVALUATED PATIENT FLUID AND VITAL SIGNS
Detail Level: Zone I have re-evaluated the patient's fluid status and reviewed vital signs. Clinical perfusion assessment was performed.

## 2024-07-31 ENCOUNTER — INPATIENT (INPATIENT)
Facility: HOSPITAL | Age: 74
LOS: 2 days | Discharge: HOME | End: 2024-08-03
Attending: INTERNAL MEDICINE | Admitting: INTERNAL MEDICINE
Payer: MEDICARE

## 2024-07-31 VITALS
SYSTOLIC BLOOD PRESSURE: 183 MMHG | HEIGHT: 66 IN | TEMPERATURE: 98 F | WEIGHT: 149.91 LBS | RESPIRATION RATE: 20 BRPM | DIASTOLIC BLOOD PRESSURE: 106 MMHG | OXYGEN SATURATION: 99 % | HEART RATE: 87 BPM

## 2024-07-31 LAB
ALBUMIN SERPL ELPH-MCNC: 3.8 G/DL — SIGNIFICANT CHANGE UP (ref 3.3–5)
ALP SERPL-CCNC: 87 U/L — SIGNIFICANT CHANGE UP (ref 40–120)
ALT FLD-CCNC: 29 U/L — SIGNIFICANT CHANGE UP (ref 12–78)
ANION GAP SERPL CALC-SCNC: 12 MMOL/L — SIGNIFICANT CHANGE UP (ref 5–17)
AST SERPL-CCNC: 28 U/L — SIGNIFICANT CHANGE UP (ref 15–37)
BASOPHILS # BLD AUTO: 0.02 K/UL — SIGNIFICANT CHANGE UP (ref 0–0.2)
BASOPHILS NFR BLD AUTO: 0.1 % — SIGNIFICANT CHANGE UP (ref 0–2)
BILIRUB SERPL-MCNC: 0.5 MG/DL — SIGNIFICANT CHANGE UP (ref 0.2–1.2)
BUN SERPL-MCNC: 46 MG/DL — HIGH (ref 7–23)
CALCIUM SERPL-MCNC: 9.2 MG/DL — SIGNIFICANT CHANGE UP (ref 8.5–10.1)
CHLORIDE SERPL-SCNC: 99 MMOL/L — SIGNIFICANT CHANGE UP (ref 96–108)
CO2 SERPL-SCNC: 21 MMOL/L — LOW (ref 22–31)
CREAT SERPL-MCNC: 1.56 MG/DL — HIGH (ref 0.5–1.3)
EGFR: 35 ML/MIN/1.73M2 — LOW
EGFR: 35 ML/MIN/1.73M2 — LOW
EOSINOPHIL # BLD AUTO: 0.01 K/UL — SIGNIFICANT CHANGE UP (ref 0–0.5)
EOSINOPHIL NFR BLD AUTO: 0.1 % — SIGNIFICANT CHANGE UP (ref 0–6)
GLUCOSE SERPL-MCNC: 185 MG/DL — HIGH (ref 70–99)
HCT VFR BLD CALC: 41.7 % — SIGNIFICANT CHANGE UP (ref 34.5–45)
HGB BLD-MCNC: 14.3 G/DL — SIGNIFICANT CHANGE UP (ref 11.5–15.5)
IMM GRANULOCYTES NFR BLD AUTO: 0.7 % — SIGNIFICANT CHANGE UP (ref 0–0.9)
LYMPHOCYTES # BLD AUTO: 1.35 K/UL — SIGNIFICANT CHANGE UP (ref 1–3.3)
LYMPHOCYTES # BLD AUTO: 9.3 % — LOW (ref 13–44)
MCHC RBC-ENTMCNC: 29.9 PG — SIGNIFICANT CHANGE UP (ref 27–34)
MCHC RBC-ENTMCNC: 34.3 G/DL — SIGNIFICANT CHANGE UP (ref 32–36)
MCV RBC AUTO: 87.2 FL — SIGNIFICANT CHANGE UP (ref 80–100)
MONOCYTES # BLD AUTO: 0.38 K/UL — SIGNIFICANT CHANGE UP (ref 0–0.9)
MONOCYTES NFR BLD AUTO: 2.6 % — SIGNIFICANT CHANGE UP (ref 2–14)
NEUTROPHILS # BLD AUTO: 12.67 K/UL — HIGH (ref 1.8–7.4)
NEUTROPHILS NFR BLD AUTO: 87.2 % — HIGH (ref 43–77)
NRBC # BLD: 0 /100 WBCS — SIGNIFICANT CHANGE UP (ref 0–0)
NRBC BLD-RTO: 0 /100 WBCS — SIGNIFICANT CHANGE UP (ref 0–0)
NT-PROBNP SERPL-SCNC: 2491 PG/ML — HIGH (ref 0–125)
PLATELET # BLD AUTO: 302 K/UL — SIGNIFICANT CHANGE UP (ref 150–400)
POTASSIUM SERPL-MCNC: 3.6 MMOL/L — SIGNIFICANT CHANGE UP (ref 3.5–5.3)
POTASSIUM SERPL-SCNC: 3.6 MMOL/L — SIGNIFICANT CHANGE UP (ref 3.5–5.3)
PROT SERPL-MCNC: 8.8 GM/DL — HIGH (ref 6–8.3)
RBC # BLD: 4.78 M/UL — SIGNIFICANT CHANGE UP (ref 3.8–5.2)
RBC # FLD: 13.3 % — SIGNIFICANT CHANGE UP (ref 10.3–14.5)
SODIUM SERPL-SCNC: 132 MMOL/L — LOW (ref 135–145)
TROPONIN I, HIGH SENSITIVITY RESULT: 92.1 NG/L — HIGH
TROPONIN I, HIGH SENSITIVITY RESULT: 95.3 NG/L — HIGH
WBC # BLD: 14.53 K/UL — HIGH (ref 3.8–10.5)
WBC # FLD AUTO: 14.53 K/UL — HIGH (ref 3.8–10.5)

## 2024-07-31 PROCEDURE — 93010 ELECTROCARDIOGRAM REPORT: CPT

## 2024-07-31 PROCEDURE — 74174 CTA ABD&PLVS W/CONTRAST: CPT | Mod: 26,MC

## 2024-07-31 PROCEDURE — 99291 CRITICAL CARE FIRST HOUR: CPT

## 2024-07-31 PROCEDURE — 71275 CT ANGIOGRAPHY CHEST: CPT | Mod: 26,MC

## 2024-07-31 PROCEDURE — 71046 X-RAY EXAM CHEST 2 VIEWS: CPT | Mod: 26

## 2024-07-31 RX ORDER — LABETALOL HYDROCHLORIDE 200 MG/1
10 TABLET, FILM COATED ORAL ONCE
Refills: 0 | Status: COMPLETED | OUTPATIENT
Start: 2024-07-31 | End: 2024-07-31

## 2024-07-31 RX ORDER — NICARDIPINE HCL 30 MG
5 CAPSULE ORAL
Qty: 40 | Refills: 0 | Status: DISCONTINUED | OUTPATIENT
Start: 2024-07-31 | End: 2024-08-01

## 2024-07-31 RX ORDER — NITROGLYCERIN 20 MG/G
0.4 OINTMENT TOPICAL ONCE
Refills: 0 | Status: COMPLETED | OUTPATIENT
Start: 2024-07-31 | End: 2024-07-31

## 2024-07-31 RX ADMIN — LABETALOL HYDROCHLORIDE 10 MILLIGRAM(S): 200 TABLET, FILM COATED ORAL at 20:45

## 2024-07-31 RX ADMIN — NITROGLYCERIN 0.4 MILLIGRAM(S): 20 OINTMENT TOPICAL at 20:28

## 2024-07-31 RX ADMIN — NITROGLYCERIN 0.4 MILLIGRAM(S): 20 OINTMENT TOPICAL at 19:28

## 2024-07-31 RX ADMIN — LABETALOL HYDROCHLORIDE 10 MILLIGRAM(S): 200 TABLET, FILM COATED ORAL at 21:41

## 2024-07-31 RX ADMIN — Medication 25 MG/HR: at 23:50

## 2024-08-01 ENCOUNTER — RESULT REVIEW (OUTPATIENT)
Age: 74
End: 2024-08-01

## 2024-08-01 PROBLEM — Z86.79 PERSONAL HISTORY OF OTHER DISEASES OF THE CIRCULATORY SYSTEM: Chronic | Status: ACTIVE | Noted: 2024-02-17

## 2024-08-01 LAB
A1C WITH ESTIMATED AVERAGE GLUCOSE RESULT: 6.7 % — HIGH (ref 4–5.6)
ALBUMIN SERPL ELPH-MCNC: 3.9 G/DL — SIGNIFICANT CHANGE UP (ref 3.3–5)
ALP SERPL-CCNC: 88 U/L — SIGNIFICANT CHANGE UP (ref 40–120)
ALT FLD-CCNC: 35 U/L — SIGNIFICANT CHANGE UP (ref 12–78)
ANION GAP SERPL CALC-SCNC: 17 MMOL/L — SIGNIFICANT CHANGE UP (ref 5–17)
APPEARANCE UR: CLEAR — SIGNIFICANT CHANGE UP
AST SERPL-CCNC: 40 U/L — HIGH (ref 15–37)
BACTERIA # UR AUTO: ABNORMAL /HPF
BILIRUB SERPL-MCNC: 0.8 MG/DL — SIGNIFICANT CHANGE UP (ref 0.2–1.2)
BILIRUB UR-MCNC: NEGATIVE — SIGNIFICANT CHANGE UP
BUN SERPL-MCNC: 39 MG/DL — HIGH (ref 7–23)
CALCIUM SERPL-MCNC: 9.8 MG/DL — SIGNIFICANT CHANGE UP (ref 8.5–10.1)
CHLORIDE SERPL-SCNC: 95 MMOL/L — LOW (ref 96–108)
CHOLEST SERPL-MCNC: 152 MG/DL — SIGNIFICANT CHANGE UP
CK MB BLD-MCNC: 1.8 % — SIGNIFICANT CHANGE UP (ref 0–3.5)
CK MB CFR SERPL CALC: 2 NG/ML — SIGNIFICANT CHANGE UP (ref 0.5–3.6)
CK SERPL-CCNC: 114 U/L — SIGNIFICANT CHANGE UP (ref 26–192)
CO2 SERPL-SCNC: 19 MMOL/L — LOW (ref 22–31)
COLOR SPEC: YELLOW — SIGNIFICANT CHANGE UP
CREAT SERPL-MCNC: 1.37 MG/DL — HIGH (ref 0.5–1.3)
DIFF PNL FLD: ABNORMAL
EGFR: 41 ML/MIN/1.73M2 — LOW
EGFR: 41 ML/MIN/1.73M2 — LOW
EPI CELLS # UR: PRESENT
ERYTHROCYTE [SEDIMENTATION RATE] IN BLOOD: 34 MM/HR — HIGH (ref 0–20)
ESTIMATED AVERAGE GLUCOSE: 146 MG/DL — HIGH (ref 68–114)
GLUCOSE BLDC GLUCOMTR-MCNC: 131 MG/DL — HIGH (ref 70–99)
GLUCOSE BLDC GLUCOMTR-MCNC: 173 MG/DL — HIGH (ref 70–99)
GLUCOSE BLDC GLUCOMTR-MCNC: 185 MG/DL — HIGH (ref 70–99)
GLUCOSE BLDC GLUCOMTR-MCNC: 261 MG/DL — HIGH (ref 70–99)
GLUCOSE SERPL-MCNC: 163 MG/DL — HIGH (ref 70–99)
GLUCOSE UR QL: NEGATIVE MG/DL — SIGNIFICANT CHANGE UP
HCT VFR BLD CALC: 41.9 % — SIGNIFICANT CHANGE UP (ref 34.5–45)
HDLC SERPL-MCNC: 49 MG/DL — LOW
HGB BLD-MCNC: 15.1 G/DL — SIGNIFICANT CHANGE UP (ref 11.5–15.5)
KETONES UR-MCNC: NEGATIVE MG/DL — SIGNIFICANT CHANGE UP
LDLC SERPL-MCNC: 73 MG/DL — SIGNIFICANT CHANGE UP
LEUKOCYTE ESTERASE UR-ACNC: NEGATIVE — SIGNIFICANT CHANGE UP
LIPID PNL WITH DIRECT LDL SERPL: 73 MG/DL — SIGNIFICANT CHANGE UP
MAGNESIUM SERPL-MCNC: 2.2 MG/DL — SIGNIFICANT CHANGE UP (ref 1.6–2.6)
MCHC RBC-ENTMCNC: 30.4 PG — SIGNIFICANT CHANGE UP (ref 27–34)
MCHC RBC-ENTMCNC: 36 G/DL — SIGNIFICANT CHANGE UP (ref 32–36)
MCV RBC AUTO: 84.5 FL — SIGNIFICANT CHANGE UP (ref 80–100)
NITRITE UR-MCNC: NEGATIVE — SIGNIFICANT CHANGE UP
NONHDLC SERPL-MCNC: 103 MG/DL — SIGNIFICANT CHANGE UP
NRBC # BLD: 0 /100 WBCS — SIGNIFICANT CHANGE UP (ref 0–0)
NRBC BLD-RTO: 0 /100 WBCS — SIGNIFICANT CHANGE UP (ref 0–0)
NT-PROBNP SERPL-SCNC: 5423 PG/ML — HIGH (ref 0–125)
PH UR: 6.5 — SIGNIFICANT CHANGE UP (ref 5–8)
PHOSPHATE SERPL-MCNC: 3.4 MG/DL — SIGNIFICANT CHANGE UP (ref 2.5–4.5)
PLATELET # BLD AUTO: 313 K/UL — SIGNIFICANT CHANGE UP (ref 150–400)
POTASSIUM SERPL-MCNC: 4.1 MMOL/L — SIGNIFICANT CHANGE UP (ref 3.5–5.3)
POTASSIUM SERPL-SCNC: 4.1 MMOL/L — SIGNIFICANT CHANGE UP (ref 3.5–5.3)
PROT SERPL-MCNC: 9.4 GM/DL — HIGH (ref 6–8.3)
PROT UR-MCNC: 300 MG/DL
RBC # BLD: 4.96 M/UL — SIGNIFICANT CHANGE UP (ref 3.8–5.2)
RBC # FLD: 13.2 % — SIGNIFICANT CHANGE UP (ref 10.3–14.5)
RBC CASTS # UR COMP ASSIST: 0 /HPF — SIGNIFICANT CHANGE UP (ref 0–4)
SODIUM SERPL-SCNC: 131 MMOL/L — LOW (ref 135–145)
SP GR SPEC: >1.03 — HIGH (ref 1–1.03)
T4 FREE SERPL-MCNC: 0.9 NG/DL — SIGNIFICANT CHANGE UP (ref 0.9–1.8)
TRIGL SERPL-MCNC: 180 MG/DL — HIGH
TROPONIN I, HIGH SENSITIVITY RESULT: 89.9 NG/L — HIGH
TSH SERPL-MCNC: 26.3 UIU/ML — HIGH (ref 0.36–3.74)
UROBILINOGEN FLD QL: 0.2 MG/DL — SIGNIFICANT CHANGE UP (ref 0.2–1)
WBC # BLD: 14.23 K/UL — HIGH (ref 3.8–10.5)
WBC # FLD AUTO: 14.23 K/UL — HIGH (ref 3.8–10.5)
WBC UR QL: 1 /HPF — SIGNIFICANT CHANGE UP (ref 0–5)

## 2024-08-01 PROCEDURE — 99233 SBSQ HOSP IP/OBS HIGH 50: CPT

## 2024-08-01 PROCEDURE — 93306 TTE W/DOPPLER COMPLETE: CPT | Mod: 26

## 2024-08-01 PROCEDURE — 76775 US EXAM ABDO BACK WALL LIM: CPT | Mod: 26

## 2024-08-01 PROCEDURE — G0508: CPT

## 2024-08-01 RX ORDER — ACETAMINOPHEN 500 MG/5ML
650 LIQUID (ML) ORAL ONCE
Refills: 0 | Status: COMPLETED | OUTPATIENT
Start: 2024-08-01 | End: 2024-08-01

## 2024-08-01 RX ORDER — INSULIN LISPRO 100 U/ML
INJECTION, SOLUTION INTRAVENOUS; SUBCUTANEOUS
Refills: 0 | Status: DISCONTINUED | OUTPATIENT
Start: 2024-08-01 | End: 2024-08-03

## 2024-08-01 RX ORDER — AMLODIPINE BESYLATE 10 MG/1
10 TABLET ORAL DAILY
Refills: 0 | Status: DISCONTINUED | OUTPATIENT
Start: 2024-08-02 | End: 2024-08-03

## 2024-08-01 RX ORDER — INSULIN LISPRO 100 U/ML
INJECTION, SOLUTION INTRAVENOUS; SUBCUTANEOUS EVERY 6 HOURS
Refills: 0 | Status: DISCONTINUED | OUTPATIENT
Start: 2024-08-01 | End: 2024-08-01

## 2024-08-01 RX ORDER — AMLODIPINE BESYLATE 10 MG/1
5 TABLET ORAL ONCE
Refills: 0 | Status: COMPLETED | OUTPATIENT
Start: 2024-08-01 | End: 2024-08-01

## 2024-08-01 RX ORDER — AMLODIPINE BESYLATE 10 MG/1
5 TABLET ORAL DAILY
Refills: 0 | Status: DISCONTINUED | OUTPATIENT
Start: 2024-08-01 | End: 2024-08-01

## 2024-08-01 RX ORDER — ENOXAPARIN SODIUM 100 MG/ML
40 INJECTION SUBCUTANEOUS EVERY 24 HOURS
Refills: 0 | Status: DISCONTINUED | OUTPATIENT
Start: 2024-08-01 | End: 2024-08-03

## 2024-08-01 RX ADMIN — Medication 25 MG/HR: at 03:16

## 2024-08-01 RX ADMIN — INSULIN LISPRO 1: 100 INJECTION, SOLUTION INTRAVENOUS; SUBCUTANEOUS at 21:54

## 2024-08-01 RX ADMIN — Medication 650 MILLIGRAM(S): at 20:50

## 2024-08-01 RX ADMIN — Medication 25 MG/HR: at 01:07

## 2024-08-01 RX ADMIN — Medication 10 MILLIGRAM(S): at 16:50

## 2024-08-01 RX ADMIN — INSULIN LISPRO 1: 100 INJECTION, SOLUTION INTRAVENOUS; SUBCUTANEOUS at 05:29

## 2024-08-01 RX ADMIN — ENOXAPARIN SODIUM 40 MILLIGRAM(S): 100 INJECTION SUBCUTANEOUS at 12:12

## 2024-08-01 RX ADMIN — AMLODIPINE BESYLATE 5 MILLIGRAM(S): 10 TABLET ORAL at 17:57

## 2024-08-01 RX ADMIN — Medication 1 APPLICATION(S): at 05:23

## 2024-08-01 RX ADMIN — Medication 650 MILLIGRAM(S): at 19:51

## 2024-08-01 RX ADMIN — AMLODIPINE BESYLATE 5 MILLIGRAM(S): 10 TABLET ORAL at 06:40

## 2024-08-01 RX ADMIN — INSULIN LISPRO 3: 100 INJECTION, SOLUTION INTRAVENOUS; SUBCUTANEOUS at 12:11

## 2024-08-02 DIAGNOSIS — E03.9 HYPOTHYROIDISM, UNSPECIFIED: ICD-10-CM

## 2024-08-02 LAB
ALBUMIN SERPL ELPH-MCNC: 3.6 G/DL — SIGNIFICANT CHANGE UP (ref 3.3–5)
ALP SERPL-CCNC: 78 U/L — SIGNIFICANT CHANGE UP (ref 40–120)
ALT FLD-CCNC: 29 U/L — SIGNIFICANT CHANGE UP (ref 12–78)
ANION GAP SERPL CALC-SCNC: 9 MMOL/L — SIGNIFICANT CHANGE UP (ref 5–17)
AST SERPL-CCNC: 20 U/L — SIGNIFICANT CHANGE UP (ref 15–37)
BASOPHILS # BLD AUTO: 0.04 K/UL — SIGNIFICANT CHANGE UP (ref 0–0.2)
BASOPHILS NFR BLD AUTO: 0.3 % — SIGNIFICANT CHANGE UP (ref 0–2)
BILIRUB SERPL-MCNC: 0.9 MG/DL — SIGNIFICANT CHANGE UP (ref 0.2–1.2)
BUN SERPL-MCNC: 35 MG/DL — HIGH (ref 7–23)
CALCIUM SERPL-MCNC: 9.8 MG/DL — SIGNIFICANT CHANGE UP (ref 8.5–10.1)
CHLORIDE SERPL-SCNC: 97 MMOL/L — SIGNIFICANT CHANGE UP (ref 96–108)
CO2 SERPL-SCNC: 25 MMOL/L — SIGNIFICANT CHANGE UP (ref 22–31)
CREAT SERPL-MCNC: 1.25 MG/DL — SIGNIFICANT CHANGE UP (ref 0.5–1.3)
EGFR: 46 ML/MIN/1.73M2 — LOW
EGFR: 46 ML/MIN/1.73M2 — LOW
EOSINOPHIL # BLD AUTO: 0.08 K/UL — SIGNIFICANT CHANGE UP (ref 0–0.5)
EOSINOPHIL NFR BLD AUTO: 0.6 % — SIGNIFICANT CHANGE UP (ref 0–6)
GLUCOSE BLDC GLUCOMTR-MCNC: 154 MG/DL — HIGH (ref 70–99)
GLUCOSE BLDC GLUCOMTR-MCNC: 167 MG/DL — HIGH (ref 70–99)
GLUCOSE BLDC GLUCOMTR-MCNC: 169 MG/DL — HIGH (ref 70–99)
GLUCOSE BLDC GLUCOMTR-MCNC: 263 MG/DL — HIGH (ref 70–99)
GLUCOSE SERPL-MCNC: 155 MG/DL — HIGH (ref 70–99)
HCT VFR BLD CALC: 40.7 % — SIGNIFICANT CHANGE UP (ref 34.5–45)
HGB BLD-MCNC: 14.2 G/DL — SIGNIFICANT CHANGE UP (ref 11.5–15.5)
IMM GRANULOCYTES NFR BLD AUTO: 0.6 % — SIGNIFICANT CHANGE UP (ref 0–0.9)
LYMPHOCYTES # BLD AUTO: 17.4 % — SIGNIFICANT CHANGE UP (ref 13–44)
LYMPHOCYTES # BLD AUTO: 2.22 K/UL — SIGNIFICANT CHANGE UP (ref 1–3.3)
MAGNESIUM SERPL-MCNC: 2.1 MG/DL — SIGNIFICANT CHANGE UP (ref 1.6–2.6)
MCHC RBC-ENTMCNC: 30.1 PG — SIGNIFICANT CHANGE UP (ref 27–34)
MCHC RBC-ENTMCNC: 34.9 G/DL — SIGNIFICANT CHANGE UP (ref 32–36)
MCV RBC AUTO: 86.2 FL — SIGNIFICANT CHANGE UP (ref 80–100)
MONOCYTES # BLD AUTO: 0.74 K/UL — SIGNIFICANT CHANGE UP (ref 0–0.9)
MONOCYTES NFR BLD AUTO: 5.8 % — SIGNIFICANT CHANGE UP (ref 2–14)
NEUTROPHILS # BLD AUTO: 9.59 K/UL — HIGH (ref 1.8–7.4)
NEUTROPHILS NFR BLD AUTO: 75.3 % — SIGNIFICANT CHANGE UP (ref 43–77)
NRBC # BLD: 0 /100 WBCS — SIGNIFICANT CHANGE UP (ref 0–0)
NRBC BLD-RTO: 0 /100 WBCS — SIGNIFICANT CHANGE UP (ref 0–0)
PHOSPHATE SERPL-MCNC: 2.6 MG/DL — SIGNIFICANT CHANGE UP (ref 2.5–4.5)
PLATELET # BLD AUTO: 295 K/UL — SIGNIFICANT CHANGE UP (ref 150–400)
POTASSIUM SERPL-MCNC: 3.2 MMOL/L — LOW (ref 3.5–5.3)
POTASSIUM SERPL-SCNC: 3.2 MMOL/L — LOW (ref 3.5–5.3)
PROT SERPL-MCNC: 8.4 GM/DL — HIGH (ref 6–8.3)
RBC # BLD: 4.72 M/UL — SIGNIFICANT CHANGE UP (ref 3.8–5.2)
RBC # FLD: 13.3 % — SIGNIFICANT CHANGE UP (ref 10.3–14.5)
SODIUM SERPL-SCNC: 131 MMOL/L — LOW (ref 135–145)
T3FREE SERPL-MCNC: 1.72 PG/ML — LOW (ref 2–4.4)
T4 FREE SERPL-MCNC: 0.9 NG/DL — SIGNIFICANT CHANGE UP (ref 0.9–1.8)
WBC # BLD: 12.75 K/UL — HIGH (ref 3.8–10.5)
WBC # FLD AUTO: 12.75 K/UL — HIGH (ref 3.8–10.5)

## 2024-08-02 PROCEDURE — 99232 SBSQ HOSP IP/OBS MODERATE 35: CPT

## 2024-08-02 RX ADMIN — Medication 40 MILLIEQUIVALENT(S): at 17:38

## 2024-08-02 RX ADMIN — Medication 1 APPLICATION(S): at 05:16

## 2024-08-02 RX ADMIN — INSULIN LISPRO 1: 100 INJECTION, SOLUTION INTRAVENOUS; SUBCUTANEOUS at 17:10

## 2024-08-02 RX ADMIN — INSULIN LISPRO 3: 100 INJECTION, SOLUTION INTRAVENOUS; SUBCUTANEOUS at 11:32

## 2024-08-02 RX ADMIN — AMLODIPINE BESYLATE 10 MILLIGRAM(S): 10 TABLET ORAL at 05:15

## 2024-08-02 RX ADMIN — INSULIN LISPRO 1: 100 INJECTION, SOLUTION INTRAVENOUS; SUBCUTANEOUS at 08:31

## 2024-08-02 RX ADMIN — Medication 40 MILLIEQUIVALENT(S): at 11:38

## 2024-08-02 RX ADMIN — ENOXAPARIN SODIUM 40 MILLIGRAM(S): 100 INJECTION SUBCUTANEOUS at 11:38

## 2024-08-02 RX ADMIN — INSULIN LISPRO 1: 100 INJECTION, SOLUTION INTRAVENOUS; SUBCUTANEOUS at 21:52

## 2024-08-03 ENCOUNTER — TRANSCRIPTION ENCOUNTER (OUTPATIENT)
Age: 74
End: 2024-08-03

## 2024-08-03 VITALS
SYSTOLIC BLOOD PRESSURE: 113 MMHG | OXYGEN SATURATION: 96 % | DIASTOLIC BLOOD PRESSURE: 68 MMHG | RESPIRATION RATE: 18 BRPM | HEART RATE: 78 BPM | TEMPERATURE: 99 F

## 2024-08-03 LAB
ANION GAP SERPL CALC-SCNC: 4 MMOL/L — LOW (ref 5–17)
BUN SERPL-MCNC: 38 MG/DL — HIGH (ref 7–23)
CALCIUM SERPL-MCNC: 9.1 MG/DL — SIGNIFICANT CHANGE UP (ref 8.5–10.1)
CHLORIDE SERPL-SCNC: 101 MMOL/L — SIGNIFICANT CHANGE UP (ref 96–108)
CO2 SERPL-SCNC: 26 MMOL/L — SIGNIFICANT CHANGE UP (ref 22–31)
CREAT SERPL-MCNC: 1.33 MG/DL — HIGH (ref 0.5–1.3)
EGFR: 42 ML/MIN/1.73M2 — LOW
EGFR: 42 ML/MIN/1.73M2 — LOW
GLUCOSE BLDC GLUCOMTR-MCNC: 152 MG/DL — HIGH (ref 70–99)
GLUCOSE BLDC GLUCOMTR-MCNC: 267 MG/DL — HIGH (ref 70–99)
GLUCOSE SERPL-MCNC: 183 MG/DL — HIGH (ref 70–99)
HCT VFR BLD CALC: 37.7 % — SIGNIFICANT CHANGE UP (ref 34.5–45)
HGB BLD-MCNC: 12.8 G/DL — SIGNIFICANT CHANGE UP (ref 11.5–15.5)
MAGNESIUM SERPL-MCNC: 2 MG/DL — SIGNIFICANT CHANGE UP (ref 1.6–2.6)
MCHC RBC-ENTMCNC: 30.3 PG — SIGNIFICANT CHANGE UP (ref 27–34)
MCHC RBC-ENTMCNC: 34 G/DL — SIGNIFICANT CHANGE UP (ref 32–36)
MCV RBC AUTO: 89.1 FL — SIGNIFICANT CHANGE UP (ref 80–100)
NRBC # BLD: 0 /100 WBCS — SIGNIFICANT CHANGE UP (ref 0–0)
NRBC BLD-RTO: 0 /100 WBCS — SIGNIFICANT CHANGE UP (ref 0–0)
PHOSPHATE SERPL-MCNC: 2.9 MG/DL — SIGNIFICANT CHANGE UP (ref 2.5–4.5)
PLATELET # BLD AUTO: 263 K/UL — SIGNIFICANT CHANGE UP (ref 150–400)
POTASSIUM SERPL-MCNC: 4.1 MMOL/L — SIGNIFICANT CHANGE UP (ref 3.5–5.3)
POTASSIUM SERPL-SCNC: 4.1 MMOL/L — SIGNIFICANT CHANGE UP (ref 3.5–5.3)
RBC # BLD: 4.23 M/UL — SIGNIFICANT CHANGE UP (ref 3.8–5.2)
RBC # FLD: 13.5 % — SIGNIFICANT CHANGE UP (ref 10.3–14.5)
SODIUM SERPL-SCNC: 131 MMOL/L — LOW (ref 135–145)
WBC # BLD: 11.85 K/UL — HIGH (ref 3.8–10.5)
WBC # FLD AUTO: 11.85 K/UL — HIGH (ref 3.8–10.5)

## 2024-08-03 PROCEDURE — 99239 HOSP IP/OBS DSCHRG MGMT >30: CPT

## 2024-08-03 RX ORDER — LEVOTHYROXINE SODIUM 300 MCG
112 TABLET ORAL DAILY
Refills: 0 | Status: DISCONTINUED | OUTPATIENT
Start: 2024-08-03 | End: 2024-08-03

## 2024-08-03 RX ORDER — AMLODIPINE BESYLATE 10 MG/1
1 TABLET ORAL
Qty: 60 | Refills: 0
Start: 2024-08-03 | End: 2024-10-01

## 2024-08-03 RX ADMIN — INSULIN LISPRO 1: 100 INJECTION, SOLUTION INTRAVENOUS; SUBCUTANEOUS at 08:09

## 2024-08-03 RX ADMIN — INSULIN LISPRO 3: 100 INJECTION, SOLUTION INTRAVENOUS; SUBCUTANEOUS at 12:04

## 2024-08-03 RX ADMIN — Medication 1 APPLICATION(S): at 05:30

## 2024-08-03 RX ADMIN — ENOXAPARIN SODIUM 40 MILLIGRAM(S): 100 INJECTION SUBCUTANEOUS at 12:28

## 2024-08-03 RX ADMIN — AMLODIPINE BESYLATE 10 MILLIGRAM(S): 10 TABLET ORAL at 05:30

## 2024-08-09 DIAGNOSIS — D72.829 ELEVATED WHITE BLOOD CELL COUNT, UNSPECIFIED: ICD-10-CM

## 2024-08-09 DIAGNOSIS — I10 ESSENTIAL (PRIMARY) HYPERTENSION: ICD-10-CM

## 2024-08-09 DIAGNOSIS — E11.9 TYPE 2 DIABETES MELLITUS WITHOUT COMPLICATIONS: ICD-10-CM

## 2024-08-09 DIAGNOSIS — I16.1 HYPERTENSIVE EMERGENCY: ICD-10-CM

## 2024-08-09 DIAGNOSIS — E03.9 HYPOTHYROIDISM, UNSPECIFIED: ICD-10-CM

## 2024-08-09 DIAGNOSIS — I24.89 OTHER FORMS OF ACUTE ISCHEMIC HEART DISEASE: ICD-10-CM

## 2024-08-09 DIAGNOSIS — E87.20 ACIDOSIS, UNSPECIFIED: ICD-10-CM

## 2024-08-09 DIAGNOSIS — N17.9 ACUTE KIDNEY FAILURE, UNSPECIFIED: ICD-10-CM

## 2024-08-09 DIAGNOSIS — E78.5 HYPERLIPIDEMIA, UNSPECIFIED: ICD-10-CM

## 2024-08-09 DIAGNOSIS — Z79.84 LONG TERM (CURRENT) USE OF ORAL HYPOGLYCEMIC DRUGS: ICD-10-CM

## 2024-08-09 LAB — T3FREE SERPL-MCNC: 1.7 PG/ML — LOW

## 2025-03-25 ENCOUNTER — INPATIENT (INPATIENT)
Facility: HOSPITAL | Age: 75
LOS: 6 days | Discharge: NURSING, REHAB OR LONG-TERM CARE | End: 2025-04-01
Attending: HOSPITALIST | Admitting: HOSPITALIST
Payer: MEDICARE

## 2025-03-25 VITALS
DIASTOLIC BLOOD PRESSURE: 95 MMHG | OXYGEN SATURATION: 96 % | RESPIRATION RATE: 20 BRPM | HEIGHT: 63 IN | SYSTOLIC BLOOD PRESSURE: 175 MMHG | WEIGHT: 130.07 LBS | HEART RATE: 70 BPM | TEMPERATURE: 98 F

## 2025-03-25 DIAGNOSIS — R53.1 WEAKNESS: ICD-10-CM

## 2025-03-25 DIAGNOSIS — E78.5 HYPERLIPIDEMIA, UNSPECIFIED: ICD-10-CM

## 2025-03-25 DIAGNOSIS — E03.9 HYPOTHYROIDISM, UNSPECIFIED: ICD-10-CM

## 2025-03-25 DIAGNOSIS — E11.65 TYPE 2 DIABETES MELLITUS WITH HYPERGLYCEMIA: ICD-10-CM

## 2025-03-25 DIAGNOSIS — N30.00 ACUTE CYSTITIS WITHOUT HEMATURIA: ICD-10-CM

## 2025-03-25 DIAGNOSIS — I10 ESSENTIAL (PRIMARY) HYPERTENSION: ICD-10-CM

## 2025-03-25 LAB
ALBUMIN SERPL ELPH-MCNC: 3.9 G/DL — SIGNIFICANT CHANGE UP (ref 3.3–5)
ALP SERPL-CCNC: 151 U/L — HIGH (ref 40–120)
ALT FLD-CCNC: 42 U/L — SIGNIFICANT CHANGE UP (ref 12–78)
ANION GAP SERPL CALC-SCNC: 5 MMOL/L — SIGNIFICANT CHANGE UP (ref 5–17)
APPEARANCE UR: CLEAR — SIGNIFICANT CHANGE UP
AST SERPL-CCNC: 31 U/L — SIGNIFICANT CHANGE UP (ref 15–37)
B-OH-BUTYR SERPL-SCNC: 1.1 MMOL/L — HIGH
BACTERIA # UR AUTO: ABNORMAL /HPF
BASE EXCESS BLDV CALC-SCNC: 0.3 MMOL/L — SIGNIFICANT CHANGE UP (ref -2–3)
BASOPHILS # BLD AUTO: 0.04 K/UL — SIGNIFICANT CHANGE UP (ref 0–0.2)
BASOPHILS NFR BLD AUTO: 0.4 % — SIGNIFICANT CHANGE UP (ref 0–2)
BILIRUB SERPL-MCNC: 0.7 MG/DL — SIGNIFICANT CHANGE UP (ref 0.2–1.2)
BILIRUB UR-MCNC: NEGATIVE — SIGNIFICANT CHANGE UP
BLOOD GAS COMMENTS, VENOUS: SIGNIFICANT CHANGE UP
BUN SERPL-MCNC: 19 MG/DL — SIGNIFICANT CHANGE UP (ref 7–23)
CALCIUM SERPL-MCNC: 9.9 MG/DL — SIGNIFICANT CHANGE UP (ref 8.5–10.1)
CHLORIDE SERPL-SCNC: 94 MMOL/L — LOW (ref 96–108)
CO2 BLDV-SCNC: 29 MMOL/L — HIGH (ref 22–26)
CO2 SERPL-SCNC: 26 MMOL/L — SIGNIFICANT CHANGE UP (ref 22–31)
COLOR SPEC: YELLOW — SIGNIFICANT CHANGE UP
CREAT SERPL-MCNC: 1.65 MG/DL — HIGH (ref 0.5–1.3)
DIFF PNL FLD: ABNORMAL
EGFR: 32 ML/MIN/1.73M2 — LOW
EGFR: 32 ML/MIN/1.73M2 — LOW
EOSINOPHIL # BLD AUTO: 0.07 K/UL — SIGNIFICANT CHANGE UP (ref 0–0.5)
EOSINOPHIL NFR BLD AUTO: 0.6 % — SIGNIFICANT CHANGE UP (ref 0–6)
EPI CELLS # UR: PRESENT
FLUAV AG NPH QL: SIGNIFICANT CHANGE UP
FLUBV AG NPH QL: SIGNIFICANT CHANGE UP
GAS PNL BLDV: SIGNIFICANT CHANGE UP
GLUCOSE BLDC GLUCOMTR-MCNC: 439 MG/DL — HIGH (ref 70–99)
GLUCOSE BLDC GLUCOMTR-MCNC: 451 MG/DL — CRITICAL HIGH (ref 70–99)
GLUCOSE BLDC GLUCOMTR-MCNC: 467 MG/DL — CRITICAL HIGH (ref 70–99)
GLUCOSE BLDC GLUCOMTR-MCNC: 472 MG/DL — CRITICAL HIGH (ref 70–99)
GLUCOSE BLDC GLUCOMTR-MCNC: 510 MG/DL — CRITICAL HIGH (ref 70–99)
GLUCOSE BLDC GLUCOMTR-MCNC: 563 MG/DL — CRITICAL HIGH (ref 70–99)
GLUCOSE BLDC GLUCOMTR-MCNC: >600 MG/DL — CRITICAL HIGH (ref 70–99)
GLUCOSE SERPL-MCNC: 672 MG/DL — CRITICAL HIGH (ref 70–99)
GLUCOSE UR QL: >=1000 MG/DL
HCO3 BLDV-SCNC: 27 MMOL/L — SIGNIFICANT CHANGE UP (ref 22–28)
HCT VFR BLD CALC: 37.9 % — SIGNIFICANT CHANGE UP (ref 34.5–45)
HGB BLD-MCNC: 13.8 G/DL — SIGNIFICANT CHANGE UP (ref 11.5–15.5)
IMM GRANULOCYTES NFR BLD AUTO: 0.8 % — SIGNIFICANT CHANGE UP (ref 0–0.9)
KETONES UR-MCNC: ABNORMAL MG/DL
LACTATE SERPL-SCNC: 1.9 MMOL/L — SIGNIFICANT CHANGE UP (ref 0.7–2)
LEUKOCYTE ESTERASE UR-ACNC: ABNORMAL
LYMPHOCYTES # BLD AUTO: 2.64 K/UL — SIGNIFICANT CHANGE UP (ref 1–3.3)
LYMPHOCYTES # BLD AUTO: 23.2 % — SIGNIFICANT CHANGE UP (ref 13–44)
MAGNESIUM SERPL-MCNC: 2.2 MG/DL — SIGNIFICANT CHANGE UP (ref 1.6–2.6)
MCHC RBC-ENTMCNC: 31.4 PG — SIGNIFICANT CHANGE UP (ref 27–34)
MCHC RBC-ENTMCNC: 36.4 G/DL — HIGH (ref 32–36)
MCV RBC AUTO: 86.3 FL — SIGNIFICANT CHANGE UP (ref 80–100)
MONOCYTES # BLD AUTO: 0.53 K/UL — SIGNIFICANT CHANGE UP (ref 0–0.9)
MONOCYTES NFR BLD AUTO: 4.7 % — SIGNIFICANT CHANGE UP (ref 2–14)
NEUTROPHILS # BLD AUTO: 8 K/UL — HIGH (ref 1.8–7.4)
NEUTROPHILS NFR BLD AUTO: 70.3 % — SIGNIFICANT CHANGE UP (ref 43–77)
NITRITE UR-MCNC: POSITIVE
NRBC BLD AUTO-RTO: 0 /100 WBCS — SIGNIFICANT CHANGE UP (ref 0–0)
PCO2 BLDV: 53 MMHG — SIGNIFICANT CHANGE UP (ref 42–55)
PH BLDV: 7.32 — SIGNIFICANT CHANGE UP (ref 7.32–7.43)
PH UR: 6.5 — SIGNIFICANT CHANGE UP (ref 5–8)
PHOSPHATE SERPL-MCNC: 3.1 MG/DL — SIGNIFICANT CHANGE UP (ref 2.5–4.5)
PLATELET # BLD AUTO: 270 K/UL — SIGNIFICANT CHANGE UP (ref 150–400)
PO2 BLDV: <20 MMHG — LOW (ref 25–45)
POTASSIUM SERPL-MCNC: 4.8 MMOL/L — SIGNIFICANT CHANGE UP (ref 3.5–5.3)
POTASSIUM SERPL-SCNC: 4.8 MMOL/L — SIGNIFICANT CHANGE UP (ref 3.5–5.3)
PROT SERPL-MCNC: 8.4 GM/DL — HIGH (ref 6–8.3)
PROT UR-MCNC: 30 MG/DL
RBC # BLD: 4.39 M/UL — SIGNIFICANT CHANGE UP (ref 3.8–5.2)
RBC # FLD: 11.9 % — SIGNIFICANT CHANGE UP (ref 10.3–14.5)
RBC CASTS # UR COMP ASSIST: 1 /HPF — SIGNIFICANT CHANGE UP (ref 0–4)
RSV RNA NPH QL NAA+NON-PROBE: SIGNIFICANT CHANGE UP
SAO2 % BLDV: 27.8 % — LOW (ref 94–98)
SARS-COV-2 RNA SPEC QL NAA+PROBE: SIGNIFICANT CHANGE UP
SODIUM SERPL-SCNC: 125 MMOL/L — LOW (ref 135–145)
SOURCE RESPIRATORY: SIGNIFICANT CHANGE UP
SP GR SPEC: 1.02 — SIGNIFICANT CHANGE UP (ref 1–1.03)
T3 SERPL-MCNC: 82 NG/DL — SIGNIFICANT CHANGE UP (ref 80–200)
T4 AB SER-ACNC: 21.1 UG/DL — HIGH (ref 4.6–12)
TSH SERPL-MCNC: 0.13 UU/ML — LOW (ref 0.36–3.74)
UROBILINOGEN FLD QL: 0.2 MG/DL — SIGNIFICANT CHANGE UP (ref 0.2–1)
WBC # BLD: 11.37 K/UL — HIGH (ref 3.8–10.5)
WBC # FLD AUTO: 11.37 K/UL — HIGH (ref 3.8–10.5)
WBC UR QL: 30 /HPF — HIGH (ref 0–5)

## 2025-03-25 PROCEDURE — 99285 EMERGENCY DEPT VISIT HI MDM: CPT

## 2025-03-25 PROCEDURE — 93010 ELECTROCARDIOGRAM REPORT: CPT

## 2025-03-25 PROCEDURE — 71045 X-RAY EXAM CHEST 1 VIEW: CPT | Mod: 26

## 2025-03-25 PROCEDURE — 99223 1ST HOSP IP/OBS HIGH 75: CPT

## 2025-03-25 RX ORDER — SODIUM CHLORIDE 9 G/1000ML
1000 INJECTION, SOLUTION INTRAVENOUS
Refills: 0 | Status: DISCONTINUED | OUTPATIENT
Start: 2025-03-25 | End: 2025-03-28

## 2025-03-25 RX ORDER — INSULIN LISPRO 100 U/ML
7 INJECTION, SOLUTION INTRAVENOUS; SUBCUTANEOUS ONCE
Refills: 0 | Status: COMPLETED | OUTPATIENT
Start: 2025-03-25 | End: 2025-03-25

## 2025-03-25 RX ORDER — INSULIN LISPRO 100 U/ML
INJECTION, SOLUTION INTRAVENOUS; SUBCUTANEOUS AT BEDTIME
Refills: 0 | Status: DISCONTINUED | OUTPATIENT
Start: 2025-03-25 | End: 2025-04-01

## 2025-03-25 RX ORDER — INSULIN GLARGINE-YFGN 100 [IU]/ML
12 INJECTION, SOLUTION SUBCUTANEOUS ONCE
Refills: 0 | Status: COMPLETED | OUTPATIENT
Start: 2025-03-25 | End: 2025-03-25

## 2025-03-25 RX ORDER — MELATONIN 5 MG
3 TABLET ORAL AT BEDTIME
Refills: 0 | Status: DISCONTINUED | OUTPATIENT
Start: 2025-03-25 | End: 2025-04-01

## 2025-03-25 RX ORDER — SODIUM CHLORIDE 9 G/1000ML
1000 INJECTION, SOLUTION INTRAVENOUS
Refills: 0 | Status: DISCONTINUED | OUTPATIENT
Start: 2025-03-25 | End: 2025-04-01

## 2025-03-25 RX ORDER — HEPARIN SODIUM 1000 [USP'U]/ML
5000 INJECTION INTRAVENOUS; SUBCUTANEOUS EVERY 8 HOURS
Refills: 0 | Status: DISCONTINUED | OUTPATIENT
Start: 2025-03-25 | End: 2025-04-01

## 2025-03-25 RX ORDER — INSULIN LISPRO 100 U/ML
INJECTION, SOLUTION INTRAVENOUS; SUBCUTANEOUS
Refills: 0 | Status: DISCONTINUED | OUTPATIENT
Start: 2025-03-25 | End: 2025-04-01

## 2025-03-25 RX ORDER — AMLODIPINE BESYLATE 10 MG/1
10 TABLET ORAL DAILY
Refills: 0 | Status: DISCONTINUED | OUTPATIENT
Start: 2025-03-26 | End: 2025-04-01

## 2025-03-25 RX ORDER — INSULIN LISPRO 100 U/ML
4 INJECTION, SOLUTION INTRAVENOUS; SUBCUTANEOUS
Refills: 0 | Status: DISCONTINUED | OUTPATIENT
Start: 2025-03-25 | End: 2025-03-27

## 2025-03-25 RX ORDER — DEXTROSE 50 % IN WATER 50 %
15 SYRINGE (ML) INTRAVENOUS ONCE
Refills: 0 | Status: DISCONTINUED | OUTPATIENT
Start: 2025-03-25 | End: 2025-04-01

## 2025-03-25 RX ORDER — LOSARTAN POTASSIUM 100 MG/1
25 TABLET, FILM COATED ORAL ONCE
Refills: 0 | Status: COMPLETED | OUTPATIENT
Start: 2025-03-25 | End: 2025-03-25

## 2025-03-25 RX ORDER — INSULIN LISPRO 100 U/ML
12 INJECTION, SOLUTION INTRAVENOUS; SUBCUTANEOUS ONCE
Refills: 0 | Status: COMPLETED | OUTPATIENT
Start: 2025-03-25 | End: 2025-03-25

## 2025-03-25 RX ORDER — ATORVASTATIN CALCIUM 80 MG/1
10 TABLET, FILM COATED ORAL AT BEDTIME
Refills: 0 | Status: DISCONTINUED | OUTPATIENT
Start: 2025-03-25 | End: 2025-04-01

## 2025-03-25 RX ORDER — INSULIN GLARGINE-YFGN 100 [IU]/ML
12 INJECTION, SOLUTION SUBCUTANEOUS EVERY MORNING
Refills: 0 | Status: DISCONTINUED | OUTPATIENT
Start: 2025-03-26 | End: 2025-03-27

## 2025-03-25 RX ORDER — DEXTROSE 50 % IN WATER 50 %
25 SYRINGE (ML) INTRAVENOUS ONCE
Refills: 0 | Status: DISCONTINUED | OUTPATIENT
Start: 2025-03-25 | End: 2025-04-01

## 2025-03-25 RX ORDER — AMLODIPINE BESYLATE 10 MG/1
10 TABLET ORAL ONCE
Refills: 0 | Status: COMPLETED | OUTPATIENT
Start: 2025-03-25 | End: 2025-03-25

## 2025-03-25 RX ORDER — INSULIN LISPRO 100 U/ML
5 INJECTION, SOLUTION INTRAVENOUS; SUBCUTANEOUS ONCE
Refills: 0 | Status: COMPLETED | OUTPATIENT
Start: 2025-03-25 | End: 2025-03-25

## 2025-03-25 RX ORDER — CEFTRIAXONE 500 MG/1
1000 INJECTION, POWDER, FOR SOLUTION INTRAMUSCULAR; INTRAVENOUS EVERY 24 HOURS
Refills: 0 | Status: COMPLETED | OUTPATIENT
Start: 2025-03-26 | End: 2025-03-28

## 2025-03-25 RX ORDER — GLUCAGON 3 MG/1
1 POWDER NASAL ONCE
Refills: 0 | Status: DISCONTINUED | OUTPATIENT
Start: 2025-03-25 | End: 2025-04-01

## 2025-03-25 RX ORDER — ACETAMINOPHEN 500 MG/5ML
650 LIQUID (ML) ORAL EVERY 6 HOURS
Refills: 0 | Status: DISCONTINUED | OUTPATIENT
Start: 2025-03-25 | End: 2025-04-01

## 2025-03-25 RX ORDER — CEFTRIAXONE 500 MG/1
1000 INJECTION, POWDER, FOR SOLUTION INTRAMUSCULAR; INTRAVENOUS ONCE
Refills: 0 | Status: COMPLETED | OUTPATIENT
Start: 2025-03-25 | End: 2025-03-25

## 2025-03-25 RX ORDER — DEXTROSE 50 % IN WATER 50 %
12.5 SYRINGE (ML) INTRAVENOUS ONCE
Refills: 0 | Status: DISCONTINUED | OUTPATIENT
Start: 2025-03-25 | End: 2025-04-01

## 2025-03-25 RX ADMIN — Medication 1000 MILLILITER(S): at 11:34

## 2025-03-25 RX ADMIN — INSULIN LISPRO 12: 100 INJECTION, SOLUTION INTRAVENOUS; SUBCUTANEOUS at 16:48

## 2025-03-25 RX ADMIN — Medication 1000 MILLILITER(S): at 09:44

## 2025-03-25 RX ADMIN — INSULIN LISPRO 5 UNIT(S): 100 INJECTION, SOLUTION INTRAVENOUS; SUBCUTANEOUS at 11:11

## 2025-03-25 RX ADMIN — Medication 1000 MILLILITER(S): at 13:32

## 2025-03-25 RX ADMIN — CEFTRIAXONE 100 MILLIGRAM(S): 500 INJECTION, POWDER, FOR SOLUTION INTRAMUSCULAR; INTRAVENOUS at 11:42

## 2025-03-25 RX ADMIN — AMLODIPINE BESYLATE 10 MILLIGRAM(S): 10 TABLET ORAL at 09:44

## 2025-03-25 RX ADMIN — INSULIN LISPRO 12 UNIT(S): 100 INJECTION, SOLUTION INTRAVENOUS; SUBCUTANEOUS at 22:02

## 2025-03-25 RX ADMIN — SODIUM CHLORIDE 150 MILLILITER(S): 9 INJECTION, SOLUTION INTRAVENOUS at 21:19

## 2025-03-25 RX ADMIN — INSULIN LISPRO 7 UNIT(S): 100 INJECTION, SOLUTION INTRAVENOUS; SUBCUTANEOUS at 15:50

## 2025-03-25 RX ADMIN — SODIUM CHLORIDE 150 MILLILITER(S): 9 INJECTION, SOLUTION INTRAVENOUS at 15:57

## 2025-03-25 RX ADMIN — CEFTRIAXONE 1000 MILLIGRAM(S): 500 INJECTION, POWDER, FOR SOLUTION INTRAMUSCULAR; INTRAVENOUS at 13:31

## 2025-03-25 RX ADMIN — INSULIN LISPRO 4 UNIT(S): 100 INJECTION, SOLUTION INTRAVENOUS; SUBCUTANEOUS at 16:47

## 2025-03-25 RX ADMIN — SODIUM CHLORIDE 150 MILLILITER(S): 9 INJECTION, SOLUTION INTRAVENOUS at 14:08

## 2025-03-25 RX ADMIN — HEPARIN SODIUM 5000 UNIT(S): 1000 INJECTION INTRAVENOUS; SUBCUTANEOUS at 14:07

## 2025-03-25 RX ADMIN — HEPARIN SODIUM 5000 UNIT(S): 1000 INJECTION INTRAVENOUS; SUBCUTANEOUS at 21:20

## 2025-03-25 RX ADMIN — Medication 3 MILLIGRAM(S): at 21:19

## 2025-03-25 RX ADMIN — LOSARTAN POTASSIUM 25 MILLIGRAM(S): 100 TABLET, FILM COATED ORAL at 09:44

## 2025-03-25 RX ADMIN — INSULIN GLARGINE-YFGN 12 UNIT(S): 100 INJECTION, SOLUTION SUBCUTANEOUS at 14:09

## 2025-03-25 RX ADMIN — Medication 1000 MILLILITER(S): at 11:32

## 2025-03-25 RX ADMIN — ATORVASTATIN CALCIUM 10 MILLIGRAM(S): 80 TABLET, FILM COATED ORAL at 21:20

## 2025-03-25 NOTE — ED ADULT TRIAGE NOTE - CHIEF COMPLAINT QUOTE
General weakness not able to ambulate with a walker, incontinent H/O DM HTN CHOL   by EMS IV left AC 20g with N/S infusing

## 2025-03-25 NOTE — ED ADULT NURSE REASSESSMENT NOTE - NS ED NURSE REASSESS COMMENT FT1
Pt resting comfortably at this time. No s/s of pain noted. Ate lunch.  at bedside. Primafit in place

## 2025-03-25 NOTE — PATIENT PROFILE ADULT - MONEY FOR FOOD
How Severe Is Your Scar?: moderate
Is This A New Presentation, Or A Follow-Up?: Scar
Additional History: Patient continues to follow with plastic surgeon for routine evaluation of previous scar.
no

## 2025-03-25 NOTE — ED PROVIDER NOTE - OBJECTIVE STATEMENT
Yamile BENOIT: 74-year-old female, history of diabetes thyroiditis high blood pressure, presents from home with a chief complaint of generalized weakness, per EMS report is unable to ambulate with walker, however per patient she is able to ambulate, patient appears to be suffering intermittent episodes of confusion however at this time denies any acute complaints no nausea vomiting fever no chest pain no trouble breathing no abdominal pain, is at baseline urinary continent, she can move everything and feel everything.  Denies any recent falls or trauma.  Patient appears A and O x 4.

## 2025-03-25 NOTE — ED PROVIDER NOTE - CLINICAL SUMMARY MEDICAL DECISION MAKING FREE TEXT BOX
Yamile BENOIT:   exam her vitals are stable nontoxic-appearing with physical exam as above DDx concern for possible DKA given screening triage blood sugar, patient is mildly hypertensive as well, overall has a reassuring physical exam, will get screening blood work evaluate for infectious metabolic etiology, get collateral from family upon ED arrival and reassess anticipate admission.

## 2025-03-25 NOTE — CONSULT NOTE ADULT - PROBLEM SELECTOR RECOMMENDATION 2
.  May need to be put back on methimazole.  She probably will need low-dose methimazole for her life because once off the medication for hyperthyroidism tends to return  Thank you for the courtesy of this consultation

## 2025-03-25 NOTE — PATIENT PROFILE ADULT - FALL HARM RISK - HARM RISK INTERVENTIONS

## 2025-03-25 NOTE — CONSULT NOTE ADULT - SUBJECTIVE AND OBJECTIVE BOX
Patient is a 74y old  Female who presents with a chief complaint of severe hyperglycemia, UTI (25 Mar 2025 13:29)      Reason For Consult:     HPI:  74 years old female with h/o HTN, HLD, DM, Hypothyroidism  ( h/o hyperthyroid, previously treated with thionamide, later developed hypothyroidism) present to ED with generalized weakness. Patient reported generalized weakness, polydipsia, polyuria for 2 weeks. Initially stated that she ran out of medications for 2 weeks but after telling patient that she last picked up DM medication was in 08/2024 per surescript, patient stated that it sounds about right. No fever or chills.   Hypertensive, afebrile, sat well atRA. EKG with SNR. WBC 11.37, plt 270, Na 125 ( corrected Na 134), glucose 672, Cr 1.65 ( 1.33 in 08/2024), TSH 0.131. UA dirty.  (25 Mar 2025 13:29)      PAST MEDICAL & SURGICAL HISTORY:  Diabetes      Acquired hypothyroidism      H/O: HTN (hypertension)      No significant past surgical history          FAMILY HISTORY:        Social History:    MEDICATIONS  (STANDING):  atorvastatin 10 milliGRAM(s) Oral at bedtime  dextrose 5%. 1000 milliLiter(s) (50 mL/Hr) IV Continuous <Continuous>  dextrose 5%. 1000 milliLiter(s) (100 mL/Hr) IV Continuous <Continuous>  dextrose 50% Injectable 25 Gram(s) IV Push once  dextrose 50% Injectable 12.5 Gram(s) IV Push once  dextrose 50% Injectable 25 Gram(s) IV Push once  glucagon  Injectable 1 milliGRAM(s) IntraMuscular once  heparin   Injectable 5000 Unit(s) SubCutaneous every 8 hours  insulin lispro (ADMELOG) corrective regimen sliding scale   SubCutaneous three times a day before meals  insulin lispro (ADMELOG) corrective regimen sliding scale   SubCutaneous at bedtime  insulin lispro Injectable (ADMELOG) 4 Unit(s) SubCutaneous three times a day before meals  insulin lispro Injectable (ADMELOG). 12 Unit(s) SubCutaneous once  lactated ringers. 1000 milliLiter(s) (150 mL/Hr) IV Continuous <Continuous>    MEDICATIONS  (PRN):  acetaminophen     Tablet .. 650 milliGRAM(s) Oral every 6 hours PRN Mild Pain (1 - 3), Moderate Pain (4 - 6)  dextrose Oral Gel 15 Gram(s) Oral once PRN Blood Glucose LESS THAN 70 milliGRAM(s)/deciliter  melatonin 3 milliGRAM(s) Oral at bedtime PRN Insomnia      REVIEW OF SYSTEMS:  CONSTITUTIONAL:  as per HPI  HEENT:  Eyes:  No diplopia or blurred vision.   ENT:  No earache, sore throat or runny nose.  CARDIOVASCULAR:  No chest pain .  RESPIRATORY:  No cough, shortness of breath, PND or orthopnea.  GASTROINTESTINAL:  No nausea, vomiting or diarrhea.  GENITOURINARY:  No dysuria, frequency or urgency. No Blood in urine  MUSCULOSKELETAL:  no joint aches, no muscle pain, myalgia  SKIN:  No change in skin, hair or nails.  NEUROLOGIC:  No paresthesias, fasciculations, seizures or weakness.  PSYCHIATRIC:  No disorder of thought or mood.  ENDOCRINE:  No heat or cold intolerance, polyuria or polydipsia. abnormal weight gain or loss, oral thrush  HEMATOLOGICAL:  No easy bruising or bleeding.     T(C): 36.6 (03-25-25 @ 16:31), Max: 36.7 (03-25-25 @ 09:00)  HR: 66 (03-25-25 @ 16:31) (65 - 80)  BP: 152/82 (03-25-25 @ 16:31) (144/77 - 202/92)  RR: 18 (03-25-25 @ 16:31) (14 - 21)  SpO2: 94% (03-25-25 @ 16:31) (94% - 100%)  Wt(kg): --    PHYSICAL EXAM:  GENERAL: NAD, well-groomed, well-developed  HEAD:  Atraumatic, Normocephalic  EYES: PERRLA, conjunctiva and sclera clear  ENMT: No  exudates,, Moist mucous membranes,, No lesions  NECK: Supple, No JVD,   NERVOUS SYSTEM:  Alert & Oriented   CHEST/LUNG: Clear to percussion bilaterally; No rales, rhonchi, wheezing, or rubs  HEART: Regular rate and rhythm; No murmurs, rubs, or gallops  ABDOMEN: Soft, Nontender, Nondistended; Bowel sounds present  EXTREMITIES:  2+ Peripheral Pulses, No clubbing, cyanosis, or edema  LYMPH: No lymphadenopathy noted  SKIN: No rashes or lesions    CAPILLARY BLOOD GLUCOSE      POCT Blood Glucose.: 451 mg/dL (25 Mar 2025 21:05)  POCT Blood Glucose.: 467 mg/dL (25 Mar 2025 21:03)  POCT Blood Glucose.: 439 mg/dL (25 Mar 2025 18:00)  POCT Blood Glucose.: 472 mg/dL (25 Mar 2025 16:45)  POCT Blood Glucose.: 563 mg/dL (25 Mar 2025 15:49)  POCT Blood Glucose.: >600 mg/dL (25 Mar 2025 15:43)  POCT Blood Glucose.: 510 mg/dL (25 Mar 2025 14:29)  POCT Blood Glucose.: 480 mg/dL (25 Mar 2025 13:08)  POCT Blood Glucose.: 505 mg/dL (25 Mar 2025 13:07)  POCT Blood Glucose.: 531 mg/dL (25 Mar 2025 12:37)  POCT Blood Glucose.: 557 mg/dL (25 Mar 2025 12:36)  POCT Blood Glucose.: >600 mg/dL (25 Mar 2025 11:29)  POCT Blood Glucose.: >600 mg/dL (25 Mar 2025 11:28)  POCT Blood Glucose.: >600 mg/dL (25 Mar 2025 10:23)  POCT Blood Glucose.: >600 mg/dL (25 Mar 2025 10:21)  POCT Blood Glucose.: >600 mg/dL (25 Mar 2025 08:56)  POCT Blood Glucose.: >600 mg/dL (25 Mar 2025 08:51)                            13.8   11.37 )-----------( 270      ( 25 Mar 2025 08:59 )             37.9       CMP:  03-25 @ 08:59  SGPT 42  Albumin 3.9   Alk Phos 151   Anion Gap 5   SGOT 31   Total Bili 0.7   BUN 19   Calcium Total 9.9   CO2 26   Chloride 94   Creatinine 1.65   eGFR if AA --   eGFR if non AA --   Glucose 672   Potassium 4.8   Protein 8.4   Sodium 125      Thyroid Function Tests:  03-25 @ 11:18 TSH 0.131 FreeT4 -- T3 82 Anti TPO -- Anti Thyroglobulin Ab -- TSI --      Diabetes Tests:     Parathyroids:     Adrenals:       Radiology:                Patient is a 74y old  Female who presents with a chief complaint of severe hyperglycemia, UTI (25 Mar 2025 13:29)      Reason For Consult:  Uncontrolled blood glucose and hypothyroidism    HPI:  74 years old female with h/o HTN, HLD, DM, Hypothyroidism  ( h/o hyperthyroid, previously treated with thionamide, later developed hypothyroidism) present to ED with generalized weakness. Patient reported generalized weakness, polydipsia, polyuria for 2 weeks. Initially stated that she ran out of medications for 2 weeks but after telling patient that she last picked up DM medication was in 08/2024 per surescript, patient stated that it sounds about right. No fever or chills.   Hypertensive, afebrile, sat well atRA. EKG with SNR. WBC 11.37, plt 270, Na 125 ( corrected Na 134), glucose 672, Cr 1.65 ( 1.33 in 08/2024), TSH 0.131. UA dirty.  (25 Mar 2025 13:29)  Patient presented with hyperglycemia and did admit to being noncompliant with insulin for a few months now.  Also has a history of hyperthyroidism possibly toxic multinodular goiter ideation treated with methimazole and she got hypothyroid on that (commonly seen with methimazole and PTU which is a cumulative effect and collects in the thyroid gland).  She discontinued methimazole and ultimately again presents with suppressed TSH    PAST MEDICAL & SURGICAL HISTORY:  Diabetes      Acquired hypothyroidism      H/O: HTN (hypertension)      No significant past surgical history          FAMILY HISTORY:        Social History:    MEDICATIONS  (STANDING):  atorvastatin 10 milliGRAM(s) Oral at bedtime  dextrose 5%. 1000 milliLiter(s) (50 mL/Hr) IV Continuous <Continuous>  dextrose 5%. 1000 milliLiter(s) (100 mL/Hr) IV Continuous <Continuous>  dextrose 50% Injectable 25 Gram(s) IV Push once  dextrose 50% Injectable 12.5 Gram(s) IV Push once  dextrose 50% Injectable 25 Gram(s) IV Push once  glucagon  Injectable 1 milliGRAM(s) IntraMuscular once  heparin   Injectable 5000 Unit(s) SubCutaneous every 8 hours  insulin lispro (ADMELOG) corrective regimen sliding scale   SubCutaneous three times a day before meals  insulin lispro (ADMELOG) corrective regimen sliding scale   SubCutaneous at bedtime  insulin lispro Injectable (ADMELOG) 4 Unit(s) SubCutaneous three times a day before meals  insulin lispro Injectable (ADMELOG). 12 Unit(s) SubCutaneous once  lactated ringers. 1000 milliLiter(s) (150 mL/Hr) IV Continuous <Continuous>    MEDICATIONS  (PRN):  acetaminophen     Tablet .. 650 milliGRAM(s) Oral every 6 hours PRN Mild Pain (1 - 3), Moderate Pain (4 - 6)  dextrose Oral Gel 15 Gram(s) Oral once PRN Blood Glucose LESS THAN 70 milliGRAM(s)/deciliter  melatonin 3 milliGRAM(s) Oral at bedtime PRN Insomnia      REVIEW OF SYSTEMS:  CONSTITUTIONAL:  as per HPI  HEENT:  Eyes:  No diplopia or blurred vision.   ENT:  No earache, sore throat or runny nose.  CARDIOVASCULAR:  No chest pain .  RESPIRATORY:  No cough, shortness of breath, PND or orthopnea.  GASTROINTESTINAL:  No nausea, vomiting or diarrhea.  GENITOURINARY:  No dysuria, frequency or urgency. No Blood in urine  MUSCULOSKELETAL:  no joint aches, no muscle pain, myalgia  SKIN:  No change in skin, hair or nails.  NEUROLOGIC:  No paresthesias, fasciculations, seizures or weakness.  PSYCHIATRIC:  No disorder of thought or mood.  ENDOCRINE:  No heat or cold intolerance, polyuria or polydipsia. abnormal weight gain or loss, oral thrush  HEMATOLOGICAL:  No easy bruising or bleeding.     T(C): 36.6 (03-25-25 @ 16:31), Max: 36.7 (03-25-25 @ 09:00)  HR: 66 (03-25-25 @ 16:31) (65 - 80)  BP: 152/82 (03-25-25 @ 16:31) (144/77 - 202/92)  RR: 18 (03-25-25 @ 16:31) (14 - 21)  SpO2: 94% (03-25-25 @ 16:31) (94% - 100%)  Wt(kg): --    PHYSICAL EXAM:  GENERAL: NAD, well-groomed, well-developed  HEAD:  Atraumatic, Normocephalic  EYES: PERRLA, conjunctiva and sclera clear  ENMT: No  exudates,, Moist mucous membranes,, No lesions  NECK: Supple, No JVD,   NERVOUS SYSTEM:  Alert & Oriented   CHEST/LUNG: Clear to percussion bilaterally; No rales, rhonchi, wheezing, or rubs  HEART: Regular rate and rhythm; No murmurs, rubs, or gallops  ABDOMEN: Soft, Nontender, Nondistended; Bowel sounds present  EXTREMITIES:  2+ Peripheral Pulses, No clubbing, cyanosis, or edema  LYMPH: No lymphadenopathy noted  SKIN: No rashes or lesions    CAPILLARY BLOOD GLUCOSE      POCT Blood Glucose.: 451 mg/dL (25 Mar 2025 21:05)  POCT Blood Glucose.: 467 mg/dL (25 Mar 2025 21:03)  POCT Blood Glucose.: 439 mg/dL (25 Mar 2025 18:00)  POCT Blood Glucose.: 472 mg/dL (25 Mar 2025 16:45)  POCT Blood Glucose.: 563 mg/dL (25 Mar 2025 15:49)  POCT Blood Glucose.: >600 mg/dL (25 Mar 2025 15:43)  POCT Blood Glucose.: 510 mg/dL (25 Mar 2025 14:29)  POCT Blood Glucose.: 480 mg/dL (25 Mar 2025 13:08)  POCT Blood Glucose.: 505 mg/dL (25 Mar 2025 13:07)  POCT Blood Glucose.: 531 mg/dL (25 Mar 2025 12:37)  POCT Blood Glucose.: 557 mg/dL (25 Mar 2025 12:36)  POCT Blood Glucose.: >600 mg/dL (25 Mar 2025 11:29)  POCT Blood Glucose.: >600 mg/dL (25 Mar 2025 11:28)  POCT Blood Glucose.: >600 mg/dL (25 Mar 2025 10:23)  POCT Blood Glucose.: >600 mg/dL (25 Mar 2025 10:21)  POCT Blood Glucose.: >600 mg/dL (25 Mar 2025 08:56)  POCT Blood Glucose.: >600 mg/dL (25 Mar 2025 08:51)                            13.8   11.37 )-----------( 270      ( 25 Mar 2025 08:59 )             37.9       CMP:  03-25 @ 08:59  SGPT 42  Albumin 3.9   Alk Phos 151   Anion Gap 5   SGOT 31   Total Bili 0.7   BUN 19   Calcium Total 9.9   CO2 26   Chloride 94   Creatinine 1.65   eGFR if AA --   eGFR if non AA --   Glucose 672   Potassium 4.8   Protein 8.4   Sodium 125      Thyroid Function Tests:  03-25 @ 11:18 TSH 0.131 FreeT4 -- T3 82 Anti TPO -- Anti Thyroglobulin Ab -- TSI --      Diabetes Tests:     Parathyroids:     Adrenals:       Radiology:

## 2025-03-25 NOTE — H&P ADULT - HISTORY OF PRESENT ILLNESS
74 years old female with h/o HTN, HLD, DM, Hypothyroidism  ( h/o hyperthyroid, previously treated with thionamide, later developed hypothyroidism) present to ED with generalized weakness. Patient reported generalized weakness, polydipsia, polyuria for 2 weeks. Initially stated that she ran out of medications for 2 weeks but after telling patient that she last picked up DM medication was in 08/2024 per surescript, patient stated that it sounds about right. No fever or chills.   Hypertensive, afebrile, sat well atRA. EKG with SNR. WBC 11.37, plt 270, Na 125 ( corrected Na 134), glucose 672, Cr 1.65 ( 1.33 in 08/2024), TSH 0.131. UA dirty.

## 2025-03-25 NOTE — H&P ADULT - ASSESSMENT
74 years old female with h/o HTN, HLD, DM, Hypothyroidism  ( h/o hyperthyroid, previously treated with thionamide, later developed hypothyroidism) present to ED with generalized weakness. Patient reported generalized weakness, polydipsia, polyuria for 2 weeks. Initially stated that she ran out of medications for 2 weeks but after telling patient that she last picked up DM medication was in 08/2024 per surescript, patient stated that it sounds about right. No fever or chills.   Hypertensive, afebrile, sat well atRA. EKG with SNR. WBC 11.37, plt 270, Na 125 ( corrected Na 134), glucose 672, Cr 1.65 ( 1.33 in 08/2024), TSH 0.131. UA dirty.  CXR with no focal consolidation

## 2025-03-25 NOTE — ED ADULT NURSE NOTE - OBJECTIVE STATEMENT
Patient is alert and oriented x4 with periods of confusion. BIBA for generalized weakness this morning. Patient states  called ambulance because her BP was high and that she was feeling dizzy this morning. Did not take BP meds today. Fingerstick HI x2 in triage. Walks with a walker at home. Placed on cardiac monitor .

## 2025-03-25 NOTE — ED ADULT NURSE NOTE - NSFALLHARMRISKINTERV_ED_ALL_ED
Assistance OOB with selected safe patient handling equipment if applicable/Assistance with ambulation/Communicate risk of Fall with Harm to all staff, patient, and family/Encourage patient to sit up slowly, dangle for a short time, stand at bedside before walking/Monitor gait and stability/Monitor for mental status changes and reorient to person, place, and time, as needed/Orthostatic vital signs/Provide visual cue: red socks, yellow wristband, yellow gown, etc/Reinforce activity limits and safety measures with patient and family/Toileting schedule using arm’s reach rule for commode and bathroom/Use of alarms - bed, stretcher, chair and/or video monitoring/Bed in lowest position, wheels locked, appropriate side rails in place/Call bell, personal items and telephone in reach/Instruct patient to call for assistance before getting out of bed/chair/stretcher/Non-slip footwear applied when patient is off stretcher/[place] to call system/Physically safe environment - no spills, clutter or unnecessary equipment/Purposeful Proactive Rounding/Room/bathroom lighting operational, light cord in reach

## 2025-03-25 NOTE — CONSULT NOTE ADULT - PROBLEM SELECTOR RECOMMENDATION 9
Currently put on Lantus and lispro coverage with meals.  Glucose toxicity decreasing  Continue with the current diabetic regimen.  Patient should be compliant with her insulin upon discharge

## 2025-03-25 NOTE — ED PROVIDER NOTE - PHYSICAL EXAMINATION
Yamile BENOIT:  VITALS: Initial triage and subsequent vitals have been reviewed by me.  GEN APPEARANCE: Alert, cooperative. Non-toxic appearing. Well appearing. NAD.  HEAD: Atraumatic, normocephalic   EYES: PERRLa, EOMI, vision grossly intact.   EARS: Gross hearing intact.   NOSE: No nasal discharge, no external evidence of epistaxis. .  NECK: Supple  CV: RRR, S1S2, no c/r/m/g. No cyanosis. Extremities warm, well perfused. Cap refill <2 seconds. No bruits.   LUNGS: CTAB. No wheezing. No rales. No rhonchi. No diminished breath sounds.   ABDOMEN: Soft, NTND. No guarding or rebound. No masses.   MSK/EXT: Spine appears normal, no spine point tenderness. No CVA ttp. Normal muscular development. Pelvis stable. No obvious joint or bony deformity, no peripheral edema.   NEURO: Alert, follows commands. Weight bearing normal. Speech normal. Sensation and motor normal x4 extremities.   SKIN: Normal color for race, warm, dry and intact. No evidence of rash.  PSYCH: Normal mood and affect.

## 2025-03-25 NOTE — ED ADULT NURSE NOTE - BEFAST EYES
Amada Meneses   2017 10:15 AM   Routine Prenatal   MRN: [id number]    [facility]   Dept Phone:  [phone]    Description:  Female : 1977   Provider:  STEFF Anglin           Allergies as of 2017     Not on File      Vital Signs     Blood Pressure Weight Last Menstrual Period Smoking Status          120/64 mmHg 76.204 kg (168 lb) 2016 (Exact Date) Never Smoker         Basic Information     Date Of Birth Sex Race Ethnicity Preferred Language    1977 Female Unable to Obtain Unknown [place]ian      Problem List              ICD-10-CM Priority Class Noted - Resolved    Elderly multigravida in second trimester O09.522   3/15/2017 - Present    History of macrosomia in infant in prior pregnancy, currently pregnant in second trimester O09.292   3/15/2017 - Present    Incontinence R32   3/15/2017 - Present    Migraine without aura G43.009   3/15/2017 - Present      Health Maintenance        Date Due Completion Dates    MAMMOGRAM 2017 ---    IMM INFLUENZA (1) 2017 ---    PAP SMEAR 3/15/2020 3/15/2017    IMM DTaP/Tdap/Td Vaccine (2 - Td) 2027            Current Immunizations     Tdap Vaccine 2017      Below and/or attached are the medications your provider expects you to take. Review all of your home medications and newly ordered medications with your provider and/or pharmacist. Follow medication instructions as directed by your provider and/or pharmacist. Please keep your medication list with you and share with your provider. Update the information when medications are discontinued, doses are changed, or new medications (including over-the-counter products) are added; and carry medication information at all times in the event of emergency situations     Allergies:  No Known Allergies          Medications  Valid as of: 2017 - 10:25 AM    Generic Name Brand Name Tablet Size Instructions for use    albuterol (PROVENTIL)  2.5 mg/0.5 mL Nebu Soln (Nebu Soln) PROVENTIL 2.5 mg/0.5 mL by Nebulization route ONCE (RT).        Butalbital-APAP-Caffeine (Tab) FIORICET -40 MG Take 1 Tab by mouth every four hours as needed for Headache.        Prenatal Multivit-Min-Fe-FA   Take  by mouth.        .                 Medicines prescribed today were sent to:     [id number] - [place], [place] - [address]    [address] [place] [address]    Phone: [phone] Fax: [phone]    Open 24 Hours?: No      Medication refill instructions:       If your prescription bottle indicates you have medication refills left, it is not necessary to call your provider’s office. Please contact your pharmacy and they will refill your medication.    If your prescription bottle indicates you do not have any refills left, you may request refills at any time through one of the following ways: The online [employer] system (except Urgent Care), by calling your provider’s office, or by asking your pharmacy to contact your provider’s office with a refill request. Medication refills are processed only during regular business hours and may not be available until the next business day. Your provider may request additional information or to have a follow-up visit with you prior to refilling your medication.   *Please Note: Medication refills are assigned a new Rx number when refilled electronically. Your pharmacy may indicate that no refills were authorized even though a new prescription for the same medication is available at the pharmacy. Please request the medicine by name with the pharmacy before contacting your provider for a refill.        Other Notes About Your Plan     GIRL - Bhakti           MyChart Status: Patient Declined         No

## 2025-03-25 NOTE — H&P ADULT - NSHPPHYSICALEXAM_GEN_ALL_CORE
CONSTITUTIONAL: alert and cooperative, no acute distress.   EYES: PERRL, no scleral icterus  ENT: Mucosa moist, tongue normal  NECK: Neck supple, trachea midline, non-tender  CARDIAC: Normal S1 and S2. Regular rate and rhythms. No Pedal edema  LUNGS: Equal air entry both lungs. No rales, rhonchi, wheezing. Normal respiratory effort.   ABDOMEN: Soft, nondistended, nontender. No guarding or rebound tenderness. No hepatomegaly or splenomegaly. Bowel sound normal.   MUSCULOSKELETAL: Normocephalic, atraumatic. No significant deformity or joint abnormality  NEUROLOGICAL: No gross motor or sensory deficits  SKIN: no lesions or eruptions. Decrease turgor  PSYCHIATRIC: A&O x 3, appropriate mood and affect

## 2025-03-26 LAB
A1C WITH ESTIMATED AVERAGE GLUCOSE RESULT: >15.5 % — HIGH (ref 4–5.6)
ALBUMIN SERPL ELPH-MCNC: 2.6 G/DL — LOW (ref 3.3–5)
ALP SERPL-CCNC: 87 U/L — SIGNIFICANT CHANGE UP (ref 40–120)
ALT FLD-CCNC: 36 U/L — SIGNIFICANT CHANGE UP (ref 12–78)
ANION GAP SERPL CALC-SCNC: 5 MMOL/L — SIGNIFICANT CHANGE UP (ref 5–17)
AST SERPL-CCNC: 39 U/L — HIGH (ref 15–37)
BILIRUB SERPL-MCNC: 0.6 MG/DL — SIGNIFICANT CHANGE UP (ref 0.2–1.2)
BUN SERPL-MCNC: 20 MG/DL — SIGNIFICANT CHANGE UP (ref 7–23)
CALCIUM SERPL-MCNC: 8.5 MG/DL — SIGNIFICANT CHANGE UP (ref 8.5–10.1)
CHLORIDE SERPL-SCNC: 106 MMOL/L — SIGNIFICANT CHANGE UP (ref 96–108)
CO2 SERPL-SCNC: 24 MMOL/L — SIGNIFICANT CHANGE UP (ref 22–31)
CREAT SERPL-MCNC: 1.06 MG/DL — SIGNIFICANT CHANGE UP (ref 0.5–1.3)
EGFR: 55 ML/MIN/1.73M2 — LOW
EGFR: 55 ML/MIN/1.73M2 — LOW
ESTIMATED AVERAGE GLUCOSE: >398 MG/DL — HIGH (ref 68–114)
GLUCOSE BLDC GLUCOMTR-MCNC: 248 MG/DL — HIGH (ref 70–99)
GLUCOSE BLDC GLUCOMTR-MCNC: 366 MG/DL — HIGH (ref 70–99)
GLUCOSE BLDC GLUCOMTR-MCNC: 387 MG/DL — HIGH (ref 70–99)
GLUCOSE BLDC GLUCOMTR-MCNC: 534 MG/DL — CRITICAL HIGH (ref 70–99)
GLUCOSE BLDC GLUCOMTR-MCNC: 556 MG/DL — CRITICAL HIGH (ref 70–99)
GLUCOSE BLDC GLUCOMTR-MCNC: 572 MG/DL — CRITICAL HIGH (ref 70–99)
GLUCOSE SERPL-MCNC: 188 MG/DL — HIGH (ref 70–99)
HCT VFR BLD CALC: 33.1 % — LOW (ref 34.5–45)
HGB BLD-MCNC: 11.4 G/DL — LOW (ref 11.5–15.5)
MAGNESIUM SERPL-MCNC: 1.6 MG/DL — SIGNIFICANT CHANGE UP (ref 1.6–2.6)
MCHC RBC-ENTMCNC: 30.6 PG — SIGNIFICANT CHANGE UP (ref 27–34)
MCHC RBC-ENTMCNC: 34.4 G/DL — SIGNIFICANT CHANGE UP (ref 32–36)
MCV RBC AUTO: 89 FL — SIGNIFICANT CHANGE UP (ref 80–100)
NRBC BLD AUTO-RTO: 0 /100 WBCS — SIGNIFICANT CHANGE UP (ref 0–0)
PHOSPHATE SERPL-MCNC: 2.6 MG/DL — SIGNIFICANT CHANGE UP (ref 2.5–4.5)
PLATELET # BLD AUTO: 209 K/UL — SIGNIFICANT CHANGE UP (ref 150–400)
POTASSIUM SERPL-MCNC: 3.7 MMOL/L — SIGNIFICANT CHANGE UP (ref 3.5–5.3)
POTASSIUM SERPL-SCNC: 3.7 MMOL/L — SIGNIFICANT CHANGE UP (ref 3.5–5.3)
PROT SERPL-MCNC: 6 GM/DL — SIGNIFICANT CHANGE UP (ref 6–8.3)
RBC # BLD: 3.72 M/UL — LOW (ref 3.8–5.2)
RBC # FLD: 12.1 % — SIGNIFICANT CHANGE UP (ref 10.3–14.5)
SODIUM SERPL-SCNC: 135 MMOL/L — SIGNIFICANT CHANGE UP (ref 135–145)
WBC # BLD: 9.38 K/UL — SIGNIFICANT CHANGE UP (ref 3.8–10.5)
WBC # FLD AUTO: 9.38 K/UL — SIGNIFICANT CHANGE UP (ref 3.8–10.5)

## 2025-03-26 PROCEDURE — 99232 SBSQ HOSP IP/OBS MODERATE 35: CPT

## 2025-03-26 RX ORDER — METFORMIN HYDROCHLORIDE 850 MG/1
500 TABLET ORAL
Refills: 0 | Status: DISCONTINUED | OUTPATIENT
Start: 2025-03-26 | End: 2025-04-01

## 2025-03-26 RX ORDER — INSULIN LISPRO 100 U/ML
12 INJECTION, SOLUTION INTRAVENOUS; SUBCUTANEOUS ONCE
Refills: 0 | Status: COMPLETED | OUTPATIENT
Start: 2025-03-26 | End: 2025-03-26

## 2025-03-26 RX ORDER — NYSTATIN 100000 [USP'U]/G
1 CREAM TOPICAL
Refills: 0 | Status: DISCONTINUED | OUTPATIENT
Start: 2025-03-26 | End: 2025-04-01

## 2025-03-26 RX ADMIN — INSULIN LISPRO 10: 100 INJECTION, SOLUTION INTRAVENOUS; SUBCUTANEOUS at 16:31

## 2025-03-26 RX ADMIN — INSULIN LISPRO 4 UNIT(S): 100 INJECTION, SOLUTION INTRAVENOUS; SUBCUTANEOUS at 16:31

## 2025-03-26 RX ADMIN — HEPARIN SODIUM 5000 UNIT(S): 1000 INJECTION INTRAVENOUS; SUBCUTANEOUS at 13:01

## 2025-03-26 RX ADMIN — INSULIN LISPRO 4 UNIT(S): 100 INJECTION, SOLUTION INTRAVENOUS; SUBCUTANEOUS at 08:02

## 2025-03-26 RX ADMIN — INSULIN LISPRO 4 UNIT(S): 100 INJECTION, SOLUTION INTRAVENOUS; SUBCUTANEOUS at 11:57

## 2025-03-26 RX ADMIN — ATORVASTATIN CALCIUM 10 MILLIGRAM(S): 80 TABLET, FILM COATED ORAL at 21:29

## 2025-03-26 RX ADMIN — SODIUM CHLORIDE 150 MILLILITER(S): 9 INJECTION, SOLUTION INTRAVENOUS at 03:54

## 2025-03-26 RX ADMIN — SODIUM CHLORIDE 150 MILLILITER(S): 9 INJECTION, SOLUTION INTRAVENOUS at 17:33

## 2025-03-26 RX ADMIN — INSULIN LISPRO 4: 100 INJECTION, SOLUTION INTRAVENOUS; SUBCUTANEOUS at 21:28

## 2025-03-26 RX ADMIN — NYSTATIN 1 APPLICATION(S): 100000 CREAM TOPICAL at 17:33

## 2025-03-26 RX ADMIN — INSULIN LISPRO 10: 100 INJECTION, SOLUTION INTRAVENOUS; SUBCUTANEOUS at 11:56

## 2025-03-26 RX ADMIN — HEPARIN SODIUM 5000 UNIT(S): 1000 INJECTION INTRAVENOUS; SUBCUTANEOUS at 21:29

## 2025-03-26 RX ADMIN — INSULIN GLARGINE-YFGN 12 UNIT(S): 100 INJECTION, SOLUTION SUBCUTANEOUS at 08:00

## 2025-03-26 RX ADMIN — INSULIN LISPRO 12 UNIT(S): 100 INJECTION, SOLUTION INTRAVENOUS; SUBCUTANEOUS at 23:05

## 2025-03-26 RX ADMIN — CEFTRIAXONE 100 MILLIGRAM(S): 500 INJECTION, POWDER, FOR SOLUTION INTRAMUSCULAR; INTRAVENOUS at 12:27

## 2025-03-26 RX ADMIN — HEPARIN SODIUM 5000 UNIT(S): 1000 INJECTION INTRAVENOUS; SUBCUTANEOUS at 05:18

## 2025-03-26 RX ADMIN — AMLODIPINE BESYLATE 10 MILLIGRAM(S): 10 TABLET ORAL at 05:18

## 2025-03-26 RX ADMIN — INSULIN LISPRO 4: 100 INJECTION, SOLUTION INTRAVENOUS; SUBCUTANEOUS at 08:02

## 2025-03-26 NOTE — PHYSICAL THERAPY INITIAL EVALUATION ADULT - PERTINENT HX OF CURRENT PROBLEM, REHAB EVAL
74 years old female with h/o HTN, HLD, DM, Hypothyroidism  ( h/o hyperthyroid, previously treated with thionamide, later developed hypothyroidism) present to ED with generalized weakness. Patient reported generalized weakness, polydipsia, polyuria for 2 weeks. Initially stated that she ran out of medications for 2 weeks but after telling patient that she last picked up DM medication was in 08/2024 per surescript, patient stated that it sounds about right. No fever or chills.

## 2025-03-26 NOTE — PHYSICAL THERAPY INITIAL EVALUATION ADULT - ADDITIONAL COMMENTS
patient lives in a private house on first floor and  has about 4 steps to negotiate. patient has had  falls in past one year at home. patient uses RW to ambulate. patient  spouse assist as needed

## 2025-03-26 NOTE — PROVIDER CONTACT NOTE (HYPOGLYCEMIA EVENT) - NS PROVIDER CONTACT ASSESS-HYPO
pt had an episode of hyperglycemia above 500. sliding scale dose given and reassessed with similar results. pt remained asymptomatic  and NELI Coon notified and orders put in place executed.

## 2025-03-26 NOTE — PROVIDER CONTACT NOTE (HYPOGLYCEMIA EVENT) - NS PROVIDER CONTACT RECOMMEND-HYPO
NELI Coon notified and orders put in place executed. pt educated on diabetes and glucose levels along with meds. Metformin added to pt med regimen

## 2025-03-26 NOTE — PROVIDER CONTACT NOTE (HYPOGLYCEMIA EVENT) - NS PROVIDER CONTACT BACKGROUND-HYPO
Age: 74y    Gender: Female    POCT Blood Glucose:  556 mg/dL (03-26-25 @ 22:20)  572 mg/dL (03-26-25 @ 21:15)  534 mg/dL (03-26-25 @ 21:13)  387 mg/dL (03-26-25 @ 16:16)  366 mg/dL (03-26-25 @ 11:13)  248 mg/dL (03-26-25 @ 07:28)      eMAR:atorvastatin   10 milliGRAM(s) Oral (03-26-25 @ 21:29)    insulin glargine Injectable (LANTUS)   12 Unit(s) SubCutaneous (03-26-25 @ 08:00)    insulin lispro (ADMELOG) corrective regimen sliding scale   10 Unit(s) SubCutaneous (03-26-25 @ 16:31)   10 Unit(s) SubCutaneous (03-26-25 @ 11:56)   4 Unit(s) SubCutaneous (03-26-25 @ 08:02)    insulin lispro (ADMELOG) corrective regimen sliding scale   4 Unit(s) SubCutaneous (03-26-25 @ 21:28)    insulin lispro Injectable (ADMELOG)   4 Unit(s) SubCutaneous (03-26-25 @ 16:31)   4 Unit(s) SubCutaneous (03-26-25 @ 11:57)   4 Unit(s) SubCutaneous (03-26-25 @ 08:02)    insulin lispro Injectable (ADMELOG).   12 Unit(s) SubCutaneous (03-26-25 @ 23:05)

## 2025-03-27 LAB
A1C WITH ESTIMATED AVERAGE GLUCOSE RESULT: >15.5 % — HIGH (ref 4–5.6)
ANION GAP SERPL CALC-SCNC: 8 MMOL/L — SIGNIFICANT CHANGE UP (ref 5–17)
BUN SERPL-MCNC: 20 MG/DL — SIGNIFICANT CHANGE UP (ref 7–23)
CALCIUM SERPL-MCNC: 8.6 MG/DL — SIGNIFICANT CHANGE UP (ref 8.5–10.1)
CHLORIDE SERPL-SCNC: 105 MMOL/L — SIGNIFICANT CHANGE UP (ref 96–108)
CO2 SERPL-SCNC: 22 MMOL/L — SIGNIFICANT CHANGE UP (ref 22–31)
CREAT SERPL-MCNC: 1.11 MG/DL — SIGNIFICANT CHANGE UP (ref 0.5–1.3)
EGFR: 52 ML/MIN/1.73M2 — LOW
EGFR: 52 ML/MIN/1.73M2 — LOW
ESTIMATED AVERAGE GLUCOSE: >398 MG/DL — HIGH (ref 68–114)
GLUCOSE BLDC GLUCOMTR-MCNC: 327 MG/DL — HIGH (ref 70–99)
GLUCOSE BLDC GLUCOMTR-MCNC: 372 MG/DL — HIGH (ref 70–99)
GLUCOSE BLDC GLUCOMTR-MCNC: 381 MG/DL — HIGH (ref 70–99)
GLUCOSE BLDC GLUCOMTR-MCNC: 387 MG/DL — HIGH (ref 70–99)
GLUCOSE BLDC GLUCOMTR-MCNC: 427 MG/DL — HIGH (ref 70–99)
GLUCOSE BLDC GLUCOMTR-MCNC: 428 MG/DL — HIGH (ref 70–99)
GLUCOSE BLDC GLUCOMTR-MCNC: 449 MG/DL — HIGH (ref 70–99)
GLUCOSE SERPL-MCNC: 324 MG/DL — HIGH (ref 70–99)
HCT VFR BLD CALC: 31.8 % — LOW (ref 34.5–45)
HGB BLD-MCNC: 11.1 G/DL — LOW (ref 11.5–15.5)
MCHC RBC-ENTMCNC: 30.7 PG — SIGNIFICANT CHANGE UP (ref 27–34)
MCHC RBC-ENTMCNC: 34.9 G/DL — SIGNIFICANT CHANGE UP (ref 32–36)
MCV RBC AUTO: 88.1 FL — SIGNIFICANT CHANGE UP (ref 80–100)
NRBC BLD AUTO-RTO: 0 /100 WBCS — SIGNIFICANT CHANGE UP (ref 0–0)
PLATELET # BLD AUTO: 208 K/UL — SIGNIFICANT CHANGE UP (ref 150–400)
POTASSIUM SERPL-MCNC: 3.4 MMOL/L — LOW (ref 3.5–5.3)
POTASSIUM SERPL-SCNC: 3.4 MMOL/L — LOW (ref 3.5–5.3)
RBC # BLD: 3.61 M/UL — LOW (ref 3.8–5.2)
RBC # FLD: 11.9 % — SIGNIFICANT CHANGE UP (ref 10.3–14.5)
SODIUM SERPL-SCNC: 135 MMOL/L — SIGNIFICANT CHANGE UP (ref 135–145)
T4 FREE SERPL-MCNC: 2.3 NG/DL — HIGH (ref 0.9–1.7)
WBC # BLD: 8.32 K/UL — SIGNIFICANT CHANGE UP (ref 3.8–10.5)
WBC # FLD AUTO: 8.32 K/UL — SIGNIFICANT CHANGE UP (ref 3.8–10.5)

## 2025-03-27 PROCEDURE — 99232 SBSQ HOSP IP/OBS MODERATE 35: CPT

## 2025-03-27 RX ORDER — INSULIN GLARGINE-YFGN 100 [IU]/ML
25 INJECTION, SOLUTION SUBCUTANEOUS EVERY MORNING
Refills: 0 | Status: DISCONTINUED | OUTPATIENT
Start: 2025-03-27 | End: 2025-03-27

## 2025-03-27 RX ORDER — LOSARTAN POTASSIUM 100 MG/1
25 TABLET, FILM COATED ORAL DAILY
Refills: 0 | Status: DISCONTINUED | OUTPATIENT
Start: 2025-03-27 | End: 2025-04-01

## 2025-03-27 RX ORDER — INSULIN GLARGINE-YFGN 100 [IU]/ML
26 INJECTION, SOLUTION SUBCUTANEOUS EVERY MORNING
Refills: 0 | Status: DISCONTINUED | OUTPATIENT
Start: 2025-03-28 | End: 2025-04-01

## 2025-03-27 RX ORDER — INSULIN LISPRO 100 U/ML
8 INJECTION, SOLUTION INTRAVENOUS; SUBCUTANEOUS
Refills: 0 | Status: DISCONTINUED | OUTPATIENT
Start: 2025-03-27 | End: 2025-03-27

## 2025-03-27 RX ORDER — INSULIN LISPRO 100 U/ML
9 INJECTION, SOLUTION INTRAVENOUS; SUBCUTANEOUS
Refills: 0 | Status: DISCONTINUED | OUTPATIENT
Start: 2025-03-27 | End: 2025-04-01

## 2025-03-27 RX ADMIN — INSULIN LISPRO 3: 100 INJECTION, SOLUTION INTRAVENOUS; SUBCUTANEOUS at 21:23

## 2025-03-27 RX ADMIN — ATORVASTATIN CALCIUM 10 MILLIGRAM(S): 80 TABLET, FILM COATED ORAL at 21:23

## 2025-03-27 RX ADMIN — INSULIN LISPRO 10: 100 INJECTION, SOLUTION INTRAVENOUS; SUBCUTANEOUS at 17:00

## 2025-03-27 RX ADMIN — NYSTATIN 1 APPLICATION(S): 100000 CREAM TOPICAL at 17:33

## 2025-03-27 RX ADMIN — Medication 40 MILLIEQUIVALENT(S): at 11:35

## 2025-03-27 RX ADMIN — INSULIN GLARGINE-YFGN 12 UNIT(S): 100 INJECTION, SOLUTION SUBCUTANEOUS at 08:36

## 2025-03-27 RX ADMIN — METFORMIN HYDROCHLORIDE 500 MILLIGRAM(S): 850 TABLET ORAL at 17:00

## 2025-03-27 RX ADMIN — METFORMIN HYDROCHLORIDE 500 MILLIGRAM(S): 850 TABLET ORAL at 05:53

## 2025-03-27 RX ADMIN — Medication 3 MILLIGRAM(S): at 21:23

## 2025-03-27 RX ADMIN — INSULIN LISPRO 9 UNIT(S): 100 INJECTION, SOLUTION INTRAVENOUS; SUBCUTANEOUS at 17:00

## 2025-03-27 RX ADMIN — HEPARIN SODIUM 5000 UNIT(S): 1000 INJECTION INTRAVENOUS; SUBCUTANEOUS at 13:12

## 2025-03-27 RX ADMIN — HEPARIN SODIUM 5000 UNIT(S): 1000 INJECTION INTRAVENOUS; SUBCUTANEOUS at 05:52

## 2025-03-27 RX ADMIN — INSULIN LISPRO 10: 100 INJECTION, SOLUTION INTRAVENOUS; SUBCUTANEOUS at 08:37

## 2025-03-27 RX ADMIN — AMLODIPINE BESYLATE 10 MILLIGRAM(S): 10 TABLET ORAL at 05:52

## 2025-03-27 RX ADMIN — INSULIN LISPRO 9 UNIT(S): 100 INJECTION, SOLUTION INTRAVENOUS; SUBCUTANEOUS at 11:35

## 2025-03-27 RX ADMIN — SODIUM CHLORIDE 150 MILLILITER(S): 9 INJECTION, SOLUTION INTRAVENOUS at 05:53

## 2025-03-27 RX ADMIN — INSULIN LISPRO 4 UNIT(S): 100 INJECTION, SOLUTION INTRAVENOUS; SUBCUTANEOUS at 08:36

## 2025-03-27 RX ADMIN — HEPARIN SODIUM 5000 UNIT(S): 1000 INJECTION INTRAVENOUS; SUBCUTANEOUS at 21:22

## 2025-03-27 RX ADMIN — NYSTATIN 1 APPLICATION(S): 100000 CREAM TOPICAL at 05:53

## 2025-03-27 RX ADMIN — CEFTRIAXONE 100 MILLIGRAM(S): 500 INJECTION, POWDER, FOR SOLUTION INTRAMUSCULAR; INTRAVENOUS at 11:34

## 2025-03-27 RX ADMIN — INSULIN LISPRO 12: 100 INJECTION, SOLUTION INTRAVENOUS; SUBCUTANEOUS at 11:36

## 2025-03-27 RX ADMIN — LOSARTAN POTASSIUM 25 MILLIGRAM(S): 100 TABLET, FILM COATED ORAL at 11:35

## 2025-03-27 NOTE — PROVIDER CONTACT NOTE (OTHER) - ASSESSMENT
Pt is alert and oriented x3, on room air, and in no acute distress. Denies pain or discomfort.
pt denies dizzyness, blurred vision, polydipsia and polyuria
Pt is alert and oriented x3, forgetful, on room air, and in no acute distress. Denies pain or discomfort.

## 2025-03-27 NOTE — PROVIDER CONTACT NOTE (OTHER) - SITUATION
glucose 449. Repeat glucose 427
Blood glucose 472, insulin given as per orders
First Blood glucose >600, second Blood glucose 563, insulin administered as per orders

## 2025-03-27 NOTE — PROVIDER CONTACT NOTE (OTHER) - BACKGROUND
PT admitted for UTI, hyperglycemia
Pt admitted for UTI and hyperglycemia
Pt admitted for hyperglycemia and UTI

## 2025-03-28 LAB
-  AMOXICILLIN/CLAVULANIC ACID: SIGNIFICANT CHANGE UP
-  AMPICILLIN/SULBACTAM: SIGNIFICANT CHANGE UP
-  AMPICILLIN: SIGNIFICANT CHANGE UP
-  AZTREONAM: SIGNIFICANT CHANGE UP
-  CEFAZOLIN: SIGNIFICANT CHANGE UP
-  CEFEPIME: SIGNIFICANT CHANGE UP
-  CEFOXITIN: SIGNIFICANT CHANGE UP
-  CEFTRIAXONE: SIGNIFICANT CHANGE UP
-  CEFUROXIME: SIGNIFICANT CHANGE UP
-  CIPROFLOXACIN: SIGNIFICANT CHANGE UP
-  ERTAPENEM: SIGNIFICANT CHANGE UP
-  GENTAMICIN: SIGNIFICANT CHANGE UP
-  IMIPENEM: SIGNIFICANT CHANGE UP
-  LEVOFLOXACIN: SIGNIFICANT CHANGE UP
-  MEROPENEM: SIGNIFICANT CHANGE UP
-  NITROFURANTOIN: SIGNIFICANT CHANGE UP
-  PIPERACILLIN/TAZOBACTAM: SIGNIFICANT CHANGE UP
-  TOBRAMYCIN: SIGNIFICANT CHANGE UP
-  TRIMETHOPRIM/SULFAMETHOXAZOLE: SIGNIFICANT CHANGE UP
ANION GAP SERPL CALC-SCNC: 6 MMOL/L — SIGNIFICANT CHANGE UP (ref 5–17)
BUN SERPL-MCNC: 22 MG/DL — SIGNIFICANT CHANGE UP (ref 7–23)
CALCIUM SERPL-MCNC: 8.6 MG/DL — SIGNIFICANT CHANGE UP (ref 8.5–10.1)
CHLORIDE SERPL-SCNC: 104 MMOL/L — SIGNIFICANT CHANGE UP (ref 96–108)
CO2 SERPL-SCNC: 24 MMOL/L — SIGNIFICANT CHANGE UP (ref 22–31)
CREAT SERPL-MCNC: 1.27 MG/DL — SIGNIFICANT CHANGE UP (ref 0.5–1.3)
CULTURE RESULTS: ABNORMAL
EGFR: 44 ML/MIN/1.73M2 — LOW
EGFR: 44 ML/MIN/1.73M2 — LOW
GLUCOSE BLDC GLUCOMTR-MCNC: 237 MG/DL — HIGH (ref 70–99)
GLUCOSE BLDC GLUCOMTR-MCNC: 271 MG/DL — HIGH (ref 70–99)
GLUCOSE BLDC GLUCOMTR-MCNC: 318 MG/DL — HIGH (ref 70–99)
GLUCOSE BLDC GLUCOMTR-MCNC: 415 MG/DL — HIGH (ref 70–99)
GLUCOSE BLDC GLUCOMTR-MCNC: 435 MG/DL — HIGH (ref 70–99)
GLUCOSE SERPL-MCNC: 405 MG/DL — HIGH (ref 70–99)
METHOD TYPE: SIGNIFICANT CHANGE UP
ORGANISM # SPEC MICROSCOPIC CNT: ABNORMAL
ORGANISM # SPEC MICROSCOPIC CNT: SIGNIFICANT CHANGE UP
POTASSIUM SERPL-MCNC: 3.9 MMOL/L — SIGNIFICANT CHANGE UP (ref 3.5–5.3)
POTASSIUM SERPL-SCNC: 3.9 MMOL/L — SIGNIFICANT CHANGE UP (ref 3.5–5.3)
SODIUM SERPL-SCNC: 134 MMOL/L — LOW (ref 135–145)
SPECIMEN SOURCE: SIGNIFICANT CHANGE UP

## 2025-03-28 PROCEDURE — 99232 SBSQ HOSP IP/OBS MODERATE 35: CPT

## 2025-03-28 RX ADMIN — METFORMIN HYDROCHLORIDE 500 MILLIGRAM(S): 850 TABLET ORAL at 05:45

## 2025-03-28 RX ADMIN — METFORMIN HYDROCHLORIDE 500 MILLIGRAM(S): 850 TABLET ORAL at 17:23

## 2025-03-28 RX ADMIN — HEPARIN SODIUM 5000 UNIT(S): 1000 INJECTION INTRAVENOUS; SUBCUTANEOUS at 13:06

## 2025-03-28 RX ADMIN — INSULIN LISPRO 9 UNIT(S): 100 INJECTION, SOLUTION INTRAVENOUS; SUBCUTANEOUS at 08:50

## 2025-03-28 RX ADMIN — CEFTRIAXONE 100 MILLIGRAM(S): 500 INJECTION, POWDER, FOR SOLUTION INTRAMUSCULAR; INTRAVENOUS at 11:50

## 2025-03-28 RX ADMIN — HEPARIN SODIUM 5000 UNIT(S): 1000 INJECTION INTRAVENOUS; SUBCUTANEOUS at 21:29

## 2025-03-28 RX ADMIN — HEPARIN SODIUM 5000 UNIT(S): 1000 INJECTION INTRAVENOUS; SUBCUTANEOUS at 05:45

## 2025-03-28 RX ADMIN — INSULIN LISPRO 8: 100 INJECTION, SOLUTION INTRAVENOUS; SUBCUTANEOUS at 11:49

## 2025-03-28 RX ADMIN — LOSARTAN POTASSIUM 25 MILLIGRAM(S): 100 TABLET, FILM COATED ORAL at 05:46

## 2025-03-28 RX ADMIN — ATORVASTATIN CALCIUM 10 MILLIGRAM(S): 80 TABLET, FILM COATED ORAL at 21:29

## 2025-03-28 RX ADMIN — AMLODIPINE BESYLATE 10 MILLIGRAM(S): 10 TABLET ORAL at 05:45

## 2025-03-28 RX ADMIN — INSULIN LISPRO 6: 100 INJECTION, SOLUTION INTRAVENOUS; SUBCUTANEOUS at 16:36

## 2025-03-28 RX ADMIN — NYSTATIN 1 APPLICATION(S): 100000 CREAM TOPICAL at 17:23

## 2025-03-28 RX ADMIN — NYSTATIN 1 APPLICATION(S): 100000 CREAM TOPICAL at 05:45

## 2025-03-28 RX ADMIN — INSULIN GLARGINE-YFGN 26 UNIT(S): 100 INJECTION, SOLUTION SUBCUTANEOUS at 08:50

## 2025-03-28 RX ADMIN — INSULIN LISPRO 9 UNIT(S): 100 INJECTION, SOLUTION INTRAVENOUS; SUBCUTANEOUS at 16:37

## 2025-03-28 RX ADMIN — INSULIN LISPRO 12: 100 INJECTION, SOLUTION INTRAVENOUS; SUBCUTANEOUS at 08:50

## 2025-03-28 RX ADMIN — INSULIN LISPRO 9 UNIT(S): 100 INJECTION, SOLUTION INTRAVENOUS; SUBCUTANEOUS at 11:50

## 2025-03-29 LAB
ANION GAP SERPL CALC-SCNC: 6 MMOL/L — SIGNIFICANT CHANGE UP (ref 5–17)
BUN SERPL-MCNC: 24 MG/DL — HIGH (ref 7–23)
CALCIUM SERPL-MCNC: 8.6 MG/DL — SIGNIFICANT CHANGE UP (ref 8.5–10.1)
CHLORIDE SERPL-SCNC: 104 MMOL/L — SIGNIFICANT CHANGE UP (ref 96–108)
CO2 SERPL-SCNC: 23 MMOL/L — SIGNIFICANT CHANGE UP (ref 22–31)
CREAT SERPL-MCNC: 1.38 MG/DL — HIGH (ref 0.5–1.3)
EGFR: 40 ML/MIN/1.73M2 — LOW
EGFR: 40 ML/MIN/1.73M2 — LOW
GLUCOSE BLDC GLUCOMTR-MCNC: 176 MG/DL — HIGH (ref 70–99)
GLUCOSE BLDC GLUCOMTR-MCNC: 191 MG/DL — HIGH (ref 70–99)
GLUCOSE BLDC GLUCOMTR-MCNC: 352 MG/DL — HIGH (ref 70–99)
GLUCOSE BLDC GLUCOMTR-MCNC: 363 MG/DL — HIGH (ref 70–99)
GLUCOSE SERPL-MCNC: 378 MG/DL — HIGH (ref 70–99)
HCT VFR BLD CALC: 31.8 % — LOW (ref 34.5–45)
HGB BLD-MCNC: 10.8 G/DL — LOW (ref 11.5–15.5)
MAGNESIUM SERPL-MCNC: 1.6 MG/DL — SIGNIFICANT CHANGE UP (ref 1.6–2.6)
MCHC RBC-ENTMCNC: 31.1 PG — SIGNIFICANT CHANGE UP (ref 27–34)
MCHC RBC-ENTMCNC: 34 G/DL — SIGNIFICANT CHANGE UP (ref 32–36)
MCV RBC AUTO: 91.6 FL — SIGNIFICANT CHANGE UP (ref 80–100)
NRBC BLD AUTO-RTO: 0 /100 WBCS — SIGNIFICANT CHANGE UP (ref 0–0)
PHOSPHATE SERPL-MCNC: 3.1 MG/DL — SIGNIFICANT CHANGE UP (ref 2.5–4.5)
PLATELET # BLD AUTO: 211 K/UL — SIGNIFICANT CHANGE UP (ref 150–400)
POTASSIUM SERPL-MCNC: 3.8 MMOL/L — SIGNIFICANT CHANGE UP (ref 3.5–5.3)
POTASSIUM SERPL-SCNC: 3.8 MMOL/L — SIGNIFICANT CHANGE UP (ref 3.5–5.3)
RBC # BLD: 3.47 M/UL — LOW (ref 3.8–5.2)
RBC # FLD: 12.2 % — SIGNIFICANT CHANGE UP (ref 10.3–14.5)
SODIUM SERPL-SCNC: 133 MMOL/L — LOW (ref 135–145)
WBC # BLD: 8.75 K/UL — SIGNIFICANT CHANGE UP (ref 3.8–10.5)
WBC # FLD AUTO: 8.75 K/UL — SIGNIFICANT CHANGE UP (ref 3.8–10.5)

## 2025-03-29 PROCEDURE — 99232 SBSQ HOSP IP/OBS MODERATE 35: CPT

## 2025-03-29 RX ADMIN — LOSARTAN POTASSIUM 25 MILLIGRAM(S): 100 TABLET, FILM COATED ORAL at 05:53

## 2025-03-29 RX ADMIN — INSULIN LISPRO 10: 100 INJECTION, SOLUTION INTRAVENOUS; SUBCUTANEOUS at 08:27

## 2025-03-29 RX ADMIN — INSULIN LISPRO 10: 100 INJECTION, SOLUTION INTRAVENOUS; SUBCUTANEOUS at 12:06

## 2025-03-29 RX ADMIN — AMLODIPINE BESYLATE 10 MILLIGRAM(S): 10 TABLET ORAL at 05:53

## 2025-03-29 RX ADMIN — METFORMIN HYDROCHLORIDE 500 MILLIGRAM(S): 850 TABLET ORAL at 08:28

## 2025-03-29 RX ADMIN — HEPARIN SODIUM 5000 UNIT(S): 1000 INJECTION INTRAVENOUS; SUBCUTANEOUS at 21:54

## 2025-03-29 RX ADMIN — NYSTATIN 1 APPLICATION(S): 100000 CREAM TOPICAL at 05:56

## 2025-03-29 RX ADMIN — ATORVASTATIN CALCIUM 10 MILLIGRAM(S): 80 TABLET, FILM COATED ORAL at 21:54

## 2025-03-29 RX ADMIN — INSULIN GLARGINE-YFGN 26 UNIT(S): 100 INJECTION, SOLUTION SUBCUTANEOUS at 08:58

## 2025-03-29 RX ADMIN — NYSTATIN 1 APPLICATION(S): 100000 CREAM TOPICAL at 17:15

## 2025-03-29 RX ADMIN — Medication 3 MILLIGRAM(S): at 21:53

## 2025-03-29 RX ADMIN — INSULIN LISPRO 2: 100 INJECTION, SOLUTION INTRAVENOUS; SUBCUTANEOUS at 16:30

## 2025-03-29 RX ADMIN — METFORMIN HYDROCHLORIDE 500 MILLIGRAM(S): 850 TABLET ORAL at 17:15

## 2025-03-29 RX ADMIN — INSULIN LISPRO 9 UNIT(S): 100 INJECTION, SOLUTION INTRAVENOUS; SUBCUTANEOUS at 16:30

## 2025-03-29 RX ADMIN — HEPARIN SODIUM 5000 UNIT(S): 1000 INJECTION INTRAVENOUS; SUBCUTANEOUS at 13:10

## 2025-03-29 RX ADMIN — INSULIN LISPRO 9 UNIT(S): 100 INJECTION, SOLUTION INTRAVENOUS; SUBCUTANEOUS at 12:06

## 2025-03-29 RX ADMIN — INSULIN LISPRO 9 UNIT(S): 100 INJECTION, SOLUTION INTRAVENOUS; SUBCUTANEOUS at 08:28

## 2025-03-29 RX ADMIN — HEPARIN SODIUM 5000 UNIT(S): 1000 INJECTION INTRAVENOUS; SUBCUTANEOUS at 05:53

## 2025-03-30 LAB
GLUCOSE BLDC GLUCOMTR-MCNC: 170 MG/DL — HIGH (ref 70–99)
GLUCOSE BLDC GLUCOMTR-MCNC: 210 MG/DL — HIGH (ref 70–99)
GLUCOSE BLDC GLUCOMTR-MCNC: 240 MG/DL — HIGH (ref 70–99)
GLUCOSE BLDC GLUCOMTR-MCNC: 348 MG/DL — HIGH (ref 70–99)

## 2025-03-30 PROCEDURE — 99232 SBSQ HOSP IP/OBS MODERATE 35: CPT

## 2025-03-30 RX ADMIN — INSULIN LISPRO 9 UNIT(S): 100 INJECTION, SOLUTION INTRAVENOUS; SUBCUTANEOUS at 08:32

## 2025-03-30 RX ADMIN — INSULIN GLARGINE-YFGN 26 UNIT(S): 100 INJECTION, SOLUTION SUBCUTANEOUS at 08:31

## 2025-03-30 RX ADMIN — INSULIN LISPRO 8: 100 INJECTION, SOLUTION INTRAVENOUS; SUBCUTANEOUS at 11:28

## 2025-03-30 RX ADMIN — NYSTATIN 1 APPLICATION(S): 100000 CREAM TOPICAL at 05:32

## 2025-03-30 RX ADMIN — LOSARTAN POTASSIUM 25 MILLIGRAM(S): 100 TABLET, FILM COATED ORAL at 05:32

## 2025-03-30 RX ADMIN — AMLODIPINE BESYLATE 10 MILLIGRAM(S): 10 TABLET ORAL at 05:32

## 2025-03-30 RX ADMIN — INSULIN LISPRO 9 UNIT(S): 100 INJECTION, SOLUTION INTRAVENOUS; SUBCUTANEOUS at 11:28

## 2025-03-30 RX ADMIN — ATORVASTATIN CALCIUM 10 MILLIGRAM(S): 80 TABLET, FILM COATED ORAL at 21:33

## 2025-03-30 RX ADMIN — METFORMIN HYDROCHLORIDE 500 MILLIGRAM(S): 850 TABLET ORAL at 05:32

## 2025-03-30 RX ADMIN — HEPARIN SODIUM 5000 UNIT(S): 1000 INJECTION INTRAVENOUS; SUBCUTANEOUS at 21:33

## 2025-03-30 RX ADMIN — HEPARIN SODIUM 5000 UNIT(S): 1000 INJECTION INTRAVENOUS; SUBCUTANEOUS at 05:32

## 2025-03-30 RX ADMIN — NYSTATIN 1 APPLICATION(S): 100000 CREAM TOPICAL at 17:16

## 2025-03-30 RX ADMIN — HEPARIN SODIUM 5000 UNIT(S): 1000 INJECTION INTRAVENOUS; SUBCUTANEOUS at 13:04

## 2025-03-30 RX ADMIN — INSULIN LISPRO 4: 100 INJECTION, SOLUTION INTRAVENOUS; SUBCUTANEOUS at 16:38

## 2025-03-30 RX ADMIN — Medication 3 MILLIGRAM(S): at 21:33

## 2025-03-30 RX ADMIN — INSULIN LISPRO 9 UNIT(S): 100 INJECTION, SOLUTION INTRAVENOUS; SUBCUTANEOUS at 16:39

## 2025-03-30 RX ADMIN — METFORMIN HYDROCHLORIDE 500 MILLIGRAM(S): 850 TABLET ORAL at 17:16

## 2025-03-30 RX ADMIN — INSULIN LISPRO 4: 100 INJECTION, SOLUTION INTRAVENOUS; SUBCUTANEOUS at 08:31

## 2025-03-31 LAB
GLUCOSE BLDC GLUCOMTR-MCNC: 167 MG/DL — HIGH (ref 70–99)
GLUCOSE BLDC GLUCOMTR-MCNC: 203 MG/DL — HIGH (ref 70–99)
GLUCOSE BLDC GLUCOMTR-MCNC: 236 MG/DL — HIGH (ref 70–99)
GLUCOSE BLDC GLUCOMTR-MCNC: 292 MG/DL — HIGH (ref 70–99)
T4 FREE SERPL-MCNC: 1.2 NG/DL — SIGNIFICANT CHANGE UP (ref 0.9–1.7)
TSH SERPL-MCNC: 1.3 UIU/ML — SIGNIFICANT CHANGE UP (ref 0.36–3.74)

## 2025-03-31 PROCEDURE — 99232 SBSQ HOSP IP/OBS MODERATE 35: CPT

## 2025-03-31 RX ADMIN — INSULIN LISPRO 9 UNIT(S): 100 INJECTION, SOLUTION INTRAVENOUS; SUBCUTANEOUS at 11:32

## 2025-03-31 RX ADMIN — HEPARIN SODIUM 5000 UNIT(S): 1000 INJECTION INTRAVENOUS; SUBCUTANEOUS at 05:14

## 2025-03-31 RX ADMIN — ATORVASTATIN CALCIUM 10 MILLIGRAM(S): 80 TABLET, FILM COATED ORAL at 21:23

## 2025-03-31 RX ADMIN — HEPARIN SODIUM 5000 UNIT(S): 1000 INJECTION INTRAVENOUS; SUBCUTANEOUS at 21:23

## 2025-03-31 RX ADMIN — INSULIN LISPRO 9 UNIT(S): 100 INJECTION, SOLUTION INTRAVENOUS; SUBCUTANEOUS at 17:02

## 2025-03-31 RX ADMIN — INSULIN LISPRO 4: 100 INJECTION, SOLUTION INTRAVENOUS; SUBCUTANEOUS at 17:02

## 2025-03-31 RX ADMIN — LOSARTAN POTASSIUM 25 MILLIGRAM(S): 100 TABLET, FILM COATED ORAL at 05:14

## 2025-03-31 RX ADMIN — AMLODIPINE BESYLATE 10 MILLIGRAM(S): 10 TABLET ORAL at 05:14

## 2025-03-31 RX ADMIN — METFORMIN HYDROCHLORIDE 500 MILLIGRAM(S): 850 TABLET ORAL at 17:02

## 2025-03-31 RX ADMIN — INSULIN LISPRO 4: 100 INJECTION, SOLUTION INTRAVENOUS; SUBCUTANEOUS at 08:44

## 2025-03-31 RX ADMIN — HEPARIN SODIUM 5000 UNIT(S): 1000 INJECTION INTRAVENOUS; SUBCUTANEOUS at 13:03

## 2025-03-31 RX ADMIN — INSULIN LISPRO 6: 100 INJECTION, SOLUTION INTRAVENOUS; SUBCUTANEOUS at 11:32

## 2025-03-31 RX ADMIN — NYSTATIN 1 APPLICATION(S): 100000 CREAM TOPICAL at 05:14

## 2025-03-31 RX ADMIN — INSULIN GLARGINE-YFGN 26 UNIT(S): 100 INJECTION, SOLUTION SUBCUTANEOUS at 08:44

## 2025-03-31 RX ADMIN — METFORMIN HYDROCHLORIDE 500 MILLIGRAM(S): 850 TABLET ORAL at 05:14

## 2025-03-31 RX ADMIN — INSULIN LISPRO 9 UNIT(S): 100 INJECTION, SOLUTION INTRAVENOUS; SUBCUTANEOUS at 08:45

## 2025-03-31 RX ADMIN — NYSTATIN 1 APPLICATION(S): 100000 CREAM TOPICAL at 17:03

## 2025-03-31 NOTE — PHARMACOTHERAPY INTERVENTION NOTE - COMMENTS
Educated the patient on T2DM including A1C goals, low glucose, high glucose as well as importance of taking the medication as prescribed. Also, educated the patient to follow up with podiatry/vision provider to stay up to date. Educated the patient on insulin therapy as well. There were no questions at the moment by the patient; however, will ask the RN to call pharmacist on the floor (as present) to answer any questions in the future. 
Recommended insulin glargine dose adjustment to 26 units (50% of yesterday's 28 units of sliding scale received) given hyperglycemia. Given that patient already received an AM dose of insulin glargine, order placed to start tomorrow.
Recommended insulin lispro dose adjustment to 9 units TID before meals (50% of 28 units sliding scale received yesterday, divided by 3 meals) given hyperglycemia.

## 2025-04-01 ENCOUNTER — TRANSCRIPTION ENCOUNTER (OUTPATIENT)
Age: 75
End: 2025-04-01

## 2025-04-01 VITALS
TEMPERATURE: 99 F | RESPIRATION RATE: 19 BRPM | OXYGEN SATURATION: 97 % | DIASTOLIC BLOOD PRESSURE: 68 MMHG | SYSTOLIC BLOOD PRESSURE: 128 MMHG | HEART RATE: 80 BPM

## 2025-04-01 LAB
ANION GAP SERPL CALC-SCNC: 7 MMOL/L — SIGNIFICANT CHANGE UP (ref 5–17)
BUN SERPL-MCNC: 18 MG/DL — SIGNIFICANT CHANGE UP (ref 7–23)
CALCIUM SERPL-MCNC: 9 MG/DL — SIGNIFICANT CHANGE UP (ref 8.5–10.1)
CHLORIDE SERPL-SCNC: 109 MMOL/L — HIGH (ref 96–108)
CO2 SERPL-SCNC: 22 MMOL/L — SIGNIFICANT CHANGE UP (ref 22–31)
CREAT SERPL-MCNC: 1 MG/DL — SIGNIFICANT CHANGE UP (ref 0.5–1.3)
EGFR: 59 ML/MIN/1.73M2 — LOW
EGFR: 59 ML/MIN/1.73M2 — LOW
GLUCOSE BLDC GLUCOMTR-MCNC: 179 MG/DL — HIGH (ref 70–99)
GLUCOSE BLDC GLUCOMTR-MCNC: 205 MG/DL — HIGH (ref 70–99)
GLUCOSE BLDC GLUCOMTR-MCNC: 241 MG/DL — HIGH (ref 70–99)
GLUCOSE SERPL-MCNC: 158 MG/DL — HIGH (ref 70–99)
HCT VFR BLD CALC: 33.6 % — LOW (ref 34.5–45)
HGB BLD-MCNC: 11.3 G/DL — LOW (ref 11.5–15.5)
MAGNESIUM SERPL-MCNC: 1.6 MG/DL — SIGNIFICANT CHANGE UP (ref 1.6–2.6)
MCHC RBC-ENTMCNC: 31.3 PG — SIGNIFICANT CHANGE UP (ref 27–34)
MCHC RBC-ENTMCNC: 33.6 G/DL — SIGNIFICANT CHANGE UP (ref 32–36)
MCV RBC AUTO: 93.1 FL — SIGNIFICANT CHANGE UP (ref 80–100)
NRBC BLD AUTO-RTO: 0 /100 WBCS — SIGNIFICANT CHANGE UP (ref 0–0)
PHOSPHATE SERPL-MCNC: 3.4 MG/DL — SIGNIFICANT CHANGE UP (ref 2.5–4.5)
PLATELET # BLD AUTO: 215 K/UL — SIGNIFICANT CHANGE UP (ref 150–400)
POTASSIUM SERPL-MCNC: 3.8 MMOL/L — SIGNIFICANT CHANGE UP (ref 3.5–5.3)
POTASSIUM SERPL-SCNC: 3.8 MMOL/L — SIGNIFICANT CHANGE UP (ref 3.5–5.3)
RBC # BLD: 3.61 M/UL — LOW (ref 3.8–5.2)
RBC # FLD: 12.5 % — SIGNIFICANT CHANGE UP (ref 10.3–14.5)
SODIUM SERPL-SCNC: 138 MMOL/L — SIGNIFICANT CHANGE UP (ref 135–145)
T3 SERPL-MCNC: 96 NG/DL — SIGNIFICANT CHANGE UP (ref 80–200)
T4 AB SER-ACNC: 10.9 UG/DL — SIGNIFICANT CHANGE UP (ref 4.6–12)
TSH SERPL-MCNC: 2.33 UIU/ML — SIGNIFICANT CHANGE UP (ref 0.36–3.74)
WBC # BLD: 9.2 K/UL — SIGNIFICANT CHANGE UP (ref 3.8–10.5)
WBC # FLD AUTO: 9.2 K/UL — SIGNIFICANT CHANGE UP (ref 3.8–10.5)

## 2025-04-01 PROCEDURE — 99239 HOSP IP/OBS DSCHRG MGMT >30: CPT

## 2025-04-01 RX ORDER — NYSTATIN 100000 [USP'U]/G
1 CREAM TOPICAL
Qty: 0 | Refills: 0 | DISCHARGE
Start: 2025-04-01

## 2025-04-01 RX ORDER — LOSARTAN POTASSIUM 100 MG/1
1 TABLET, FILM COATED ORAL
Refills: 0 | DISCHARGE

## 2025-04-01 RX ORDER — INSULIN LISPRO 100 U/ML
10 INJECTION, SOLUTION INTRAVENOUS; SUBCUTANEOUS
Qty: 0 | Refills: 0 | DISCHARGE
Start: 2025-04-01

## 2025-04-01 RX ORDER — ACETAMINOPHEN 500 MG/5ML
2 LIQUID (ML) ORAL
Qty: 0 | Refills: 0 | DISCHARGE
Start: 2025-04-01

## 2025-04-01 RX ORDER — AMLODIPINE BESYLATE 10 MG/1
1 TABLET ORAL
Qty: 0 | Refills: 0 | DISCHARGE
Start: 2025-04-01

## 2025-04-01 RX ORDER — INSULIN GLARGINE-YFGN 100 [IU]/ML
26 INJECTION, SOLUTION SUBCUTANEOUS
Qty: 0 | Refills: 0 | DISCHARGE
Start: 2025-04-01

## 2025-04-01 RX ORDER — LOSARTAN POTASSIUM 100 MG/1
1 TABLET, FILM COATED ORAL
Qty: 0 | Refills: 0 | DISCHARGE
Start: 2025-04-01

## 2025-04-01 RX ORDER — LEVOTHYROXINE SODIUM 300 MCG
1 TABLET ORAL
Qty: 0 | Refills: 0 | DISCHARGE

## 2025-04-01 RX ADMIN — HEPARIN SODIUM 5000 UNIT(S): 1000 INJECTION INTRAVENOUS; SUBCUTANEOUS at 13:58

## 2025-04-01 RX ADMIN — NYSTATIN 1 APPLICATION(S): 100000 CREAM TOPICAL at 05:27

## 2025-04-01 RX ADMIN — AMLODIPINE BESYLATE 10 MILLIGRAM(S): 10 TABLET ORAL at 05:27

## 2025-04-01 RX ADMIN — HEPARIN SODIUM 5000 UNIT(S): 1000 INJECTION INTRAVENOUS; SUBCUTANEOUS at 05:27

## 2025-04-01 RX ADMIN — INSULIN LISPRO 9 UNIT(S): 100 INJECTION, SOLUTION INTRAVENOUS; SUBCUTANEOUS at 11:52

## 2025-04-01 RX ADMIN — INSULIN LISPRO 4: 100 INJECTION, SOLUTION INTRAVENOUS; SUBCUTANEOUS at 08:27

## 2025-04-01 RX ADMIN — METFORMIN HYDROCHLORIDE 500 MILLIGRAM(S): 850 TABLET ORAL at 17:08

## 2025-04-01 RX ADMIN — INSULIN LISPRO 2: 100 INJECTION, SOLUTION INTRAVENOUS; SUBCUTANEOUS at 11:52

## 2025-04-01 RX ADMIN — LOSARTAN POTASSIUM 25 MILLIGRAM(S): 100 TABLET, FILM COATED ORAL at 05:27

## 2025-04-01 RX ADMIN — NYSTATIN 1 APPLICATION(S): 100000 CREAM TOPICAL at 17:08

## 2025-04-01 RX ADMIN — INSULIN GLARGINE-YFGN 26 UNIT(S): 100 INJECTION, SOLUTION SUBCUTANEOUS at 08:27

## 2025-04-01 RX ADMIN — INSULIN LISPRO 9 UNIT(S): 100 INJECTION, SOLUTION INTRAVENOUS; SUBCUTANEOUS at 08:28

## 2025-04-01 RX ADMIN — INSULIN LISPRO 4: 100 INJECTION, SOLUTION INTRAVENOUS; SUBCUTANEOUS at 17:08

## 2025-04-01 RX ADMIN — METFORMIN HYDROCHLORIDE 500 MILLIGRAM(S): 850 TABLET ORAL at 05:27

## 2025-04-01 RX ADMIN — INSULIN LISPRO 9 UNIT(S): 100 INJECTION, SOLUTION INTRAVENOUS; SUBCUTANEOUS at 17:08

## 2025-04-01 NOTE — PROGRESS NOTE ADULT - PROBLEM SELECTOR PROBLEM 1
Severe hyperglycemia due to diabetes mellitus

## 2025-04-01 NOTE — PROGRESS NOTE ADULT - PROBLEM SELECTOR PLAN 2
Check TSH and free thyroxine, patient was overcorrected to a TSH of 0.1.  There is a chance that native hypothyroidism is coming back at the TSH is still 0.1 and not completely suppressed.  Treat accordingly
Check thyroid function tests   may need to be back on levothyroxine   earlier sick thyroid syndrome may be present to decrease TSH   avoid Hypothyroidism recurrence
continue off medications   Check thyroid function tests in 1-2 days as risk of Hypothyroidism is possible   treat accordingly
off any levothyroxine   Check thyroid function tests in a few days including total T4 and Free Thyroxine and TSH  and treat accordingly
Check thyroid function tests in  2-3 days  will need to be on levothyroxine   otherwise at risk of Hypothyroidism recurring
My a.m. reduce the dose thyroxine to 75.  Total T4 may be increased secondary to increased thyroxine binding globulin or TBPA  Check free thyroxine
Check thyroid function tests periodically   currently normal thyroid function tests

## 2025-04-01 NOTE — PROGRESS NOTE ADULT - PROBLEM SELECTOR PROBLEM 2
Hypothyroidism, unspecified

## 2025-04-01 NOTE — DISCHARGE NOTE PROVIDER - CARE PROVIDER_API CALL
Arnav Mtz  Endocrinology/Metab/Diabetes  [address], [address]  [place] [address]  Phone: [phone]  Fax: [phone]  Follow Up Time: 1 week

## 2025-04-01 NOTE — PROGRESS NOTE ADULT - ASSESSMENT
4 years old female with h/o HTN, HLD, DM, Hypothyroidism  ( h/o hyperthyroid, previously treated with thionamide, later developed hypothyroidism) present to ED with generalized weakness. Patient reported generalized weakness, polydipsia, polyuria for 2 weeks. Initially stated that she ran out of medications for 2 weeks but after telling patient that she last picked up DM medication was in 08/2024 per surescript, patient stated that it sounds about right. No fever or chills.   Hypertensive, afebrile, sat well atRA. EKG with SNR. WBC 11.37, plt 270, Na 125 ( corrected Na 134), glucose 672, Cr 1.65 ( 1.33 in 08/2024), TSH 0.131. UA dirty.  CXR with no focal consolidation      # Severe hyperglycemia due to diabetes mellitus.   severe hyperglycemia with glucose 672 with pseudohyponatremia. Normal AGAP and bicarb.   CXR no focal consolidation  UA dirty  due to not taking medication  A1c > 15.5   lalntus  26U AM and premels to 9U  Closely monitor glucose and titrate insulin regimens   Endo consulted, beatris recs    #Acute cystitis without hematuria  UA dirty. Has frequency, could be due to hyperglycemia  completed ceftriaxone x 3 days  Klebsiella sensitive to ceftriaxone  UCx    #Generalized weakness.   due to dehydration and hyperglycemia  IV fluid  glycemic control  PT consulted, rec MIGUEL    #Hypothyroidism, unspecified  TSH now is suppressed to 0.131  Hold synthroid  fu Free T4    #Benign essential HTN   monitor BP and titrate BP med    #Hyperlipidemia  continue statin    Dispo: pending glucose control    PT rec -- MIGUEL
4 years old female with h/o HTN, HLD, DM, Hypothyroidism  ( h/o hyperthyroid, previously treated with thionamide, later developed hypothyroidism) present to ED with generalized weakness. Patient reported generalized weakness, polydipsia, polyuria for 2 weeks. Initially stated that she ran out of medications for 2 weeks but after telling patient that she last picked up DM medication was in 08/2024 per surescript, patient stated that it sounds about right. No fever or chills.   Hypertensive, afebrile, sat well atRA. EKG with SNR. WBC 11.37, plt 270, Na 125 ( corrected Na 134), glucose 672, Cr 1.65 ( 1.33 in 08/2024), TSH 0.131. UA dirty.  CXR with no focal consolidation      # Severe hyperglycemia due to diabetes mellitus.   severe hyperglycemia with glucose 672 with pseudohyponatremia. Normal AGAP and bicarb.   CXR no focal consolidation  UA dirty  due to not taking medication  A1c > 15.5   Lantus  26U AM and premeals to 9U  Closely monitor glucose and titrate insulin regimens   Endo consulted, beatris recs    #Acute cystitis without hematuria  UA dirty. Has frequency, could be due to hyperglycemia  completed ceftriaxone x 3 days  Klebsiella sensitive to ceftriaxone  UCx    #Generalized weakness.   due to dehydration and hyperglycemia  IV fluid  glycemic control  PT consulted, rec MIGUEL    #Hypothyroidism, unspecified  TSH now is suppressed to 0.131  Hold synthroid  fu Free T4    #Benign essential HTN   monitor BP and titrate BP med    #Hyperlipidemia  continue statin    Dispo: [facility]UEL  PT rec -- MIGUEL
4 years old female with h/o HTN, HLD, DM, Hypothyroidism  ( h/o hyperthyroid, previously treated with thionamide, later developed hypothyroidism) present to ED with generalized weakness. Patient reported generalized weakness, polydipsia, polyuria for 2 weeks. Initially stated that she ran out of medications for 2 weeks but after telling patient that she last picked up DM medication was in 08/2024 per surescript, patient stated that it sounds about right. No fever or chills.   Hypertensive, afebrile, sat well atRA. EKG with SNR. WBC 11.37, plt 270, Na 125 ( corrected Na 134), glucose 672, Cr 1.65 ( 1.33 in 08/2024), TSH 0.131. UA dirty.  CXR with no focal consolidation      # Severe hyperglycemia due to diabetes mellitus.   severe hyperglycemia with glucose 672 with pseudohyponatremia. Normal AGAP and bicarb.   CXR no focal consolidation  UA dirty  due to not taking medication  Start lantus 12 unit stat and daily, premeal 4 unit tid, ISS and hypoglycemia protocol  fu A1c  Closely monitor glucose and titrate insulin regimens   Endo consulted, beatris recs    #Acute cystitis without hematuria.   UA dirty. Has frequency, could be due to hyperglycemia  Continue ceftriaxone x 3 days  fu UCx    #Generalized weakness.   due to dehydration and hyperglycemia  IV fluid  glycemic control  PT consulted, pending recs    #Hypothyroidism, unspecified  TSH now is suppressed to 0.131  Hold synthroid  fu Free T4    #Benign essential HTN   monitor BP and titrate BP med    #Hyperlipidemia  continue statin    Dispo: pending A1C, UCx, endo recs and PT [facility]
 Female with h/o HTN, HLD, DM, Hypothyroidism  ( h/o hyperthyroid, previously treated with thionamide, later developed hypothyroidism) present to ED with generalized weakness. Patient reported generalized weakness, polydipsia, polyuria for 2 weeks. Initially stated that she ran out of medications for 2 weeks but after telling patient that she last picked up DM medication was in 08/2024 per surescript, patient stated that it sounds about right. No fever or chills.   Hypertensive, afebrile, sat well atRA. EKG with SNR. WBC 11.37, plt 270, Na 125 ( corrected Na 134), glucose 672, Cr 1.65 ( 1.33 in 08/2024), TSH 0.131. UA dirty.  CXR with no focal consolidation      clear for discharge to rehab [facility] will check thyroid function tests in AM     # Severe hyperglycemia due to diabetes mellitus.   severe hyperglycemia with glucose 672 with pseudohyponatremia. Normal AGAP and bicarb.   CXR no focal consolidation  UA dirty  due to not taking medication  A1c > 15.5   Lantus  26U AM and premeals to 9U  Closely monitor glucose and titrate insulin regimens   Endo consulted, beatris recs    #Acute cystitis without hematuria  UA dirty. Has frequency, could be due to hyperglycemia  completed ceftriaxone x 3 days  Klebsiella sensitive to ceftriaxone  UCx    #Generalized weakness.   due to dehydration and hyperglycemia  IV fluid  glycemic control  PT consulted, rec MIGUEL    #Hypothyroidism, unspecified  TSH now is suppressed to 0.131  Hold synthroid  fu Free T4    #Benign essential HTN   monitor BP and titrate BP med    #Hyperlipidemia  continue statin    Dispo: [facility]  PT rec -- MIGUEL
4 years old female with h/o HTN, HLD, DM, Hypothyroidism  ( h/o hyperthyroid, previously treated with thionamide, later developed hypothyroidism) present to ED with generalized weakness. Patient reported generalized weakness, polydipsia, polyuria for 2 weeks. Initially stated that she ran out of medications for 2 weeks but after telling patient that she last picked up DM medication was in 08/2024 per surescript, patient stated that it sounds about right. No fever or chills.   Hypertensive, afebrile, sat well atRA. EKG with SNR. WBC 11.37, plt 270, Na 125 ( corrected Na 134), glucose 672, Cr 1.65 ( 1.33 in 08/2024), TSH 0.131. UA dirty.  CXR with no focal consolidation      # Severe hyperglycemia due to diabetes mellitus.   severe hyperglycemia with glucose 672 with pseudohyponatremia. Normal AGAP and bicarb.   CXR no focal consolidation  UA dirty  due to not taking medication  A1c > 15.5   Lantus  26U AM and premeals to 9U  Closely monitor glucose and titrate insulin regimens   Endo consulted, beatris recs    #Acute cystitis without hematuria  UA dirty. Has frequency, could be due to hyperglycemia  completed ceftriaxone x 3 days  Klebsiella sensitive to ceftriaxone  UCx    #Generalized weakness.   due to dehydration and hyperglycemia  IV fluid  glycemic control  PT consulted, rec MIGUEL    #Hypothyroidism, unspecified  TSH now is suppressed to 0.131  Hold synthroid  fu Free T4    #Benign essential HTN   monitor BP and titrate BP med    #Hyperlipidemia  continue statin    Dispo: [facility]  PT rec -- MIGUEL
4 years old female with h/o HTN, HLD, DM, Hypothyroidism  ( h/o hyperthyroid, previously treated with thionamide, later developed hypothyroidism) present to ED with generalized weakness. Patient reported generalized weakness, polydipsia, polyuria for 2 weeks. Initially stated that she ran out of medications for 2 weeks but after telling patient that she last picked up DM medication was in 08/2024 per surescript, patient stated that it sounds about right. No fever or chills.   Hypertensive, afebrile, sat well atRA. EKG with SNR. WBC 11.37, plt 270, Na 125 ( corrected Na 134), glucose 672, Cr 1.65 ( 1.33 in 08/2024), TSH 0.131. UA dirty.  CXR with no focal consolidation      # Severe hyperglycemia due to diabetes mellitus.   severe hyperglycemia with glucose 672 with pseudohyponatremia. Normal AGAP and bicarb.   CXR no focal consolidation  UA dirty  due to not taking medication  A1c > 15.5  Increase alntus to 26U AM and premels to 9U  Closely monitor glucose and titrate insulin regimens   Endo consulted, beatris recs    #Acute cystitis without hematuria  UA dirty. Has frequency, could be due to hyperglycemia  Continue ceftriaxone x 3 days  fu UCx    #Generalized weakness.   due to dehydration and hyperglycemia  IV fluid  glycemic control  PT consulted, rec MIGUEL    #Hypothyroidism, unspecified  TSH now is suppressed to 0.131  Hold synthroid  fu Free T4    #Benign essential HTN   monitor BP and titrate BP med    #Hyperlipidemia  continue statin    Dispo: pending glucose control    PT rec -- MIGUEL
Hypothyroidism  with overcorrection   diabetes uncontrolled 
diabetes   Hypothyroidism 
Hypothyroidism   diabetes uncontrolled 
Hypothyroidism overcorrection   diabetes 
Hypothyroidism   uncontrolled diabetes 
Hypothyroidism   uncontrolled diabetes 
diabetes   Hypothyroidism

## 2025-04-01 NOTE — DISCHARGE NOTE PROVIDER - NSDCCPCAREPLAN_GEN_ALL_CORE_FT
PRINCIPAL DISCHARGE DIAGNOSIS  Diagnosis: Acute cystitis without hematuria  Assessment and Plan of Treatment:       SECONDARY DISCHARGE DIAGNOSES  Diagnosis: Severe hyperglycemia due to diabetes mellitus  Assessment and Plan of Treatment:     Diagnosis: Hypothyroidism, unspecified  Assessment and Plan of Treatment:     Diagnosis: Hyperglycemia  Assessment and Plan of Treatment:     Diagnosis: Acute cystitis without hematuria  Assessment and Plan of Treatment:

## 2025-04-01 NOTE — DISCHARGE NOTE PROVIDER - HOSPITAL COURSE
HPI:  74 years Female with h/o HTN, HLD, DM, Hypothyroidism  ( h/o hyperthyroid, previously treated with thionamide, later developed hypothyroidism) present to ED with generalized weakness. Patient reported generalized weakness, polydipsia, polyuria for 2 weeks. Initially stated that she ran out of medications for 2 weeks but after telling patient that she last picked up DM medication was in 08/2024 per surescript, patient stated that it sounds about right. No fever or chills.   Hypertensive, afebrile, sat well atRA. EKG with SNR. WBC 11.37, plt 270, Na 125 ( corrected Na 134), glucose 672, Cr 1.65 ( 1.33 in 08/2024), TSH 0.131. UA dirty.  CXR with no focal consolidation      clear for discharge to rehab [facility] will check thyroid function tests in AM     # Severe hyperglycemia due to diabetes mellitus.   severe hyperglycemia with glucose 672 with pseudohyponatremia. Normal AGAP and bicarb.   CXR no focal consolidation  UA dirty  due to not taking medication  A1c > 15.5   Lantus  26U AM and premeals to 9U  Closely monitor glucose and titrate insulin regimens   Endo consulted, beatris recs    #Acute cystitis without hematuria  UA dirty. Has frequency, could be due to hyperglycemia  completed ceftriaxone x 3 days  Klebsiella sensitive to ceftriaxone  UCx    #Generalized weakness.   due to dehydration and hyperglycemia  IV fluid  glycemic control  PT consulted, rec MIGUEL    #Hypothyroidism, unspecified  TSH now is suppressed to 0.131  Hold synthroid  fu Free T4    #Benign essential HTN   monitor BP and titrate BP med    #Hyperlipidemia  continue statin    Dispo: [facility]  PT rec -- MIGUEL

## 2025-04-01 NOTE — PROGRESS NOTE ADULT - PROBLEM SELECTOR PLAN 1
Continue with the current  regimen while inpatient   ? addition of Metformin
Continue with the current  regimen while inpatient   slowly decreasing glucose toxicity
Continue with the current regimen including metformin.  Once fingersticks are in the 100s, start cutting back on the insulin  Encourage more nutrition
Continue with the current  regimen while inpatient   slowly decreasing glucose toxicity   Insulin sensitizers contraindicated
Continue with the current  regimen while inpatient   while inpatient, finger sticks should be 100-180
Continue with the current  regimen while inpatient   while inpatient, finger sticks should be 100-180   may need adjustment of insulin dose to achieve target range blood glucose
Add metformin 500 mg twice a day to decrease insulin resistance continue with the current insulin regimen  while inpatient, finger sticks should be 100-180

## 2025-04-01 NOTE — PROGRESS NOTE ADULT - PROVIDER SPECIALTY LIST ADULT
Endocrinology
Hospitalist
Endocrinology
Hospitalist
Hospitalist
ECMO
Endocrinology
Endocrinology

## 2025-04-01 NOTE — DISCHARGE NOTE NURSING/CASE MANAGEMENT/SOCIAL WORK - PATIENT PORTAL LINK FT
You can access the FollowMyHealth Patient Portal offered by [facility] by registering at the following website: [email or web address]. By joining [employer]’s FollowMyHealth portal, you will also be able to view your health information using other applications (apps) compatible with our system.

## 2025-04-01 NOTE — DISCHARGE NOTE NURSING/CASE MANAGEMENT/SOCIAL WORK - FINANCIAL ASSISTANCE
[facility] provides services at a reduced cost to those who are determined to be eligible through [facility]’s financial assistance program. Information regarding [facility]’s financial assistance program can be found by going to [email or web address] or by calling [phone].

## 2025-04-01 NOTE — PROGRESS NOTE ADULT - SUBJECTIVE AND OBJECTIVE BOX
HPI:  74 years old female with h/o HTN, HLD, DM, Hypothyroidism  ( h/o hyperthyroid, previously treated with thionamide, later developed hypothyroidism) present to ED with generalized weakness. Patient reported generalized weakness, polydipsia, polyuria for 2 weeks. Initially stated that she ran out of medications for 2 weeks but after telling patient that she last picked up DM medication was in 08/2024 per surescript, patient stated that it sounds about right. No fever or chills.   Hypertensive, afebrile, sat well atRA. EKG with SNR. WBC 11.37, plt 270, Na 125 ( corrected Na 134), glucose 672, Cr 1.65 ( 1.33 in 08/2024), TSH 0.131. UA dirty.  (25 Mar 2025 13:29)  Patient is a 74y old  Female who presents with a chief complaint of severe hyperglycemia, UTI (30 Mar 2025 19:24)      INTERVAL HPI/OVERNIGHT EVENTS: no acute events overnight     MEDICATIONS  (STANDING):  amLODIPine   Tablet 10 milliGRAM(s) Oral daily  atorvastatin 10 milliGRAM(s) Oral at bedtime  dextrose 5%. 1000 milliLiter(s) (100 mL/Hr) IV Continuous <Continuous>  dextrose 5%. 1000 milliLiter(s) (50 mL/Hr) IV Continuous <Continuous>  dextrose 50% Injectable 25 Gram(s) IV Push once  dextrose 50% Injectable 12.5 Gram(s) IV Push once  dextrose 50% Injectable 25 Gram(s) IV Push once  glucagon  Injectable 1 milliGRAM(s) IntraMuscular once  heparin   Injectable 5000 Unit(s) SubCutaneous every 8 hours  insulin glargine Injectable (LANTUS) 26 Unit(s) SubCutaneous every morning  insulin lispro (ADMELOG) corrective regimen sliding scale   SubCutaneous three times a day before meals  insulin lispro (ADMELOG) corrective regimen sliding scale   SubCutaneous at bedtime  insulin lispro Injectable (ADMELOG) 9 Unit(s) SubCutaneous three times a day before meals  losartan 25 milliGRAM(s) Oral daily  metFORMIN 500 milliGRAM(s) Oral two times a day  nystatin Cream 1 Application(s) Topical two times a day    MEDICATIONS  (PRN):  acetaminophen     Tablet .. 650 milliGRAM(s) Oral every 6 hours PRN Mild Pain (1 - 3), Moderate Pain (4 - 6)  dextrose Oral Gel 15 Gram(s) Oral once PRN Blood Glucose LESS THAN 70 milliGRAM(s)/deciliter  melatonin 3 milliGRAM(s) Oral at bedtime PRN Insomnia      Allergies    No Known Allergies    Intolerances        REVIEW OF SYSTEMS:  CONSTITUTIONAL: No fever, weight loss, or fatigue  EYES: No eye pain, visual disturbances, or discharge  ENMT:  No difficulty hearing, tinnitus, vertigo; No sinus or throat pain  NECK: No pain or stiffness  BREASTS: No pain, masses, or nipple discharge  RESPIRATORY: No cough, wheezing, chills or hemoptysis; No shortness of breath  CARDIOVASCULAR: No chest pain, palpitations, dizziness, or leg swelling  GASTROINTESTINAL: No abdominal or epigastric pain. No nausea, vomiting, or hematemesis; No diarrhea or constipation. No melena or hematochezia.  GENITOURINARY: No dysuria, frequency, hematuria, or incontinence  NEUROLOGICAL: No headaches, memory loss, loss of strength, numbness, or tremors  SKIN: No itching, burning, rashes, or lesions   LYMPH NODES: No enlarged glands  ENDOCRINE: No heat or cold intolerance; No hair loss  MUSCULOSKELETAL: No joint pain or swelling; No muscle, back, or extremity pain  PSYCHIATRIC: No depression, anxiety, mood swings, or difficulty sleeping  HEME/LYMPH: No easy bruising, or bleeding gums  ALLERGY AND IMMUNOLOGIC: No hives or eczema    Vital Signs Last 24 Hrs  T(C): 36.6 (30 Mar 2025 17:41), Max: 36.9 (29 Mar 2025 23:46)  T(F): 97.9 (30 Mar 2025 17:41), Max: 98.5 (29 Mar 2025 23:46)  HR: 78 (30 Mar 2025 17:41) (69 - 78)  BP: 134/76 (30 Mar 2025 17:41) (109/70 - 145/77)  BP(mean): --  RR: 18 (30 Mar 2025 17:41) (17 - 19)  SpO2: 94% (30 Mar 2025 17:41) (93% - 96%)    Parameters below as of 30 Mar 2025 17:41  Patient On (Oxygen Delivery Method): room air        PHYSICAL EXAM:  GENERAL: NAD, well-groomed, well-developed  HEAD:  Atraumatic, Normocephalic  EYES: EOMI, PERRLA, conjunctiva and sclera clear  ENMT: No tonsillar erythema, exudates, or enlargement; Moist mucous membranes, Good dentition, No lesions  NECK: Supple, No JVD, Normal thyroid  NERVOUS SYSTEM:  Alert & Oriented X3, Good concentration; Motor Strength 5/5 B/L upper and lower extremities; DTRs 2+ intact and symmetric  CHEST/LUNG: Clear to ascultation  bilaterally; No rales, rhonchi, wheezing, or rubs  HEART: Regular rate and rhythm; No murmurs, rubs, or gallops  ABDOMEN: Soft, Nontender, Nondistended; Bowel sounds present  EXTREMITIES:  2+ Peripheral Pulses, No clubbing, cyanosis, or edema  LYMPH: No lymphadenopathy noted  SKIN: No rashes or lesions    LABS:                        10.8   8.75  )-----------( 211      ( 29 Mar 2025 07:22 )             31.8     03-29    133[L]  |  104  |  24[H]  ----------------------------<  378[H]  3.8   |  23  |  1.38[H]    Ca    8.6      29 Mar 2025 07:22  Phos  3.1     03-29  Mg     1.6     03-29        Urinalysis Basic - ( 29 Mar 2025 07:22 )    Color: x / Appearance: x / SG: x / pH: x  Gluc: 378 mg/dL / Ketone: x  / Bili: x / Urobili: x   Blood: x / Protein: x / Nitrite: x   Leuk Esterase: x / RBC: x / WBC x   Sq Epi: x / Non Sq Epi: x / Bacteria: x      CAPILLARY BLOOD GLUCOSE      POCT Blood Glucose.: 170 mg/dL (30 Mar 2025 21:23)  POCT Blood Glucose.: 210 mg/dL (30 Mar 2025 16:34)  POCT Blood Glucose.: 348 mg/dL (30 Mar 2025 11:11)  POCT Blood Glucose.: 240 mg/dL (30 Mar 2025 07:42)      RADIOLOGY & ADDITIONAL TESTS:    Imaging Personally Reviewed:  [ ] YES  [ ] NO    Consultant(s) Notes Reviewed:  [ ] YES  [ ] NO    Care Discussed with Consultants/Other Providers [ ] YES  [ ] NO
HPI:  74 years old female with h/o HTN, HLD, DM, Hypothyroidism  ( h/o hyperthyroid, previously treated with thionamide, later developed hypothyroidism) present to ED with generalized weakness. Patient reported generalized weakness, polydipsia, polyuria for 2 weeks. Initially stated that she ran out of medications for 2 weeks but after telling patient that she last picked up DM medication was in 08/2024 per surescript, patient stated that it sounds about right. No fever or chills.   Hypertensive, afebrile, sat well atRA. EKG with SNR. WBC 11.37, plt 270, Na 125 ( corrected Na 134), glucose 672, Cr 1.65 ( 1.33 in 08/2024), TSH 0.131. UA dirty.  (25 Mar 2025 13:29)  Patient is a 74y old  Female who presents with a chief complaint of severe hyperglycemia, UTI (30 Mar 2025 19:24)      INTERVAL HPI/OVERNIGHT EVENTS: no acute events overnight     MEDICATIONS  (STANDING):  amLODIPine   Tablet 10 milliGRAM(s) Oral daily  atorvastatin 10 milliGRAM(s) Oral at bedtime  dextrose 5%. 1000 milliLiter(s) (100 mL/Hr) IV Continuous <Continuous>  dextrose 5%. 1000 milliLiter(s) (50 mL/Hr) IV Continuous <Continuous>  dextrose 50% Injectable 25 Gram(s) IV Push once  dextrose 50% Injectable 12.5 Gram(s) IV Push once  dextrose 50% Injectable 25 Gram(s) IV Push once  glucagon  Injectable 1 milliGRAM(s) IntraMuscular once  heparin   Injectable 5000 Unit(s) SubCutaneous every 8 hours  insulin glargine Injectable (LANTUS) 26 Unit(s) SubCutaneous every morning  insulin lispro (ADMELOG) corrective regimen sliding scale   SubCutaneous three times a day before meals  insulin lispro (ADMELOG) corrective regimen sliding scale   SubCutaneous at bedtime  insulin lispro Injectable (ADMELOG) 9 Unit(s) SubCutaneous three times a day before meals  losartan 25 milliGRAM(s) Oral daily  metFORMIN 500 milliGRAM(s) Oral two times a day  nystatin Cream 1 Application(s) Topical two times a day    MEDICATIONS  (PRN):  acetaminophen     Tablet .. 650 milliGRAM(s) Oral every 6 hours PRN Mild Pain (1 - 3), Moderate Pain (4 - 6)  dextrose Oral Gel 15 Gram(s) Oral once PRN Blood Glucose LESS THAN 70 milliGRAM(s)/deciliter  melatonin 3 milliGRAM(s) Oral at bedtime PRN Insomnia      Allergies    No Known Allergies    Intolerances        REVIEW OF SYSTEMS:  CONSTITUTIONAL: No fever, weight loss, or fatigue  EYES: No eye pain, visual disturbances, or discharge  ENMT:  No difficulty hearing, tinnitus, vertigo; No sinus or throat pain  NECK: No pain or stiffness  BREASTS: No pain, masses, or nipple discharge  RESPIRATORY: No cough, wheezing, chills or hemoptysis; No shortness of breath  CARDIOVASCULAR: No chest pain, palpitations, dizziness, or leg swelling  GASTROINTESTINAL: No abdominal or epigastric pain. No nausea, vomiting, or hematemesis; No diarrhea or constipation. No melena or hematochezia.  GENITOURINARY: No dysuria, frequency, hematuria, or incontinence  NEUROLOGICAL: No headaches, memory loss, loss of strength, numbness, or tremors  SKIN: No itching, burning, rashes, or lesions   LYMPH NODES: No enlarged glands  ENDOCRINE: No heat or cold intolerance; No hair loss  MUSCULOSKELETAL: No joint pain or swelling; No muscle, back, or extremity pain  PSYCHIATRIC: No depression, anxiety, mood swings, or difficulty sleeping  HEME/LYMPH: No easy bruising, or bleeding gums  ALLERGY AND IMMUNOLOGIC: No hives or eczema    Vital Signs Last 24 Hrs  T(C): 36.6 (30 Mar 2025 17:41), Max: 36.9 (29 Mar 2025 23:46)  T(F): 97.9 (30 Mar 2025 17:41), Max: 98.5 (29 Mar 2025 23:46)  HR: 78 (30 Mar 2025 17:41) (69 - 78)  BP: 134/76 (30 Mar 2025 17:41) (109/70 - 145/77)  BP(mean): --  RR: 18 (30 Mar 2025 17:41) (17 - 19)  SpO2: 94% (30 Mar 2025 17:41) (93% - 96%)    Parameters below as of 30 Mar 2025 17:41  Patient On (Oxygen Delivery Method): room air        PHYSICAL EXAM:  GENERAL: NAD, well-groomed, well-developed  HEAD:  Atraumatic, Normocephalic  EYES: EOMI, PERRLA, conjunctiva and sclera clear  ENMT: No tonsillar erythema, exudates, or enlargement; Moist mucous membranes, Good dentition, No lesions  NECK: Supple, No JVD, Normal thyroid  NERVOUS SYSTEM:  Alert & Oriented X3, Good concentration; Motor Strength 5/5 B/L upper and lower extremities; DTRs 2+ intact and symmetric  CHEST/LUNG: Clear to ascultation  bilaterally; No rales, rhonchi, wheezing, or rubs  HEART: Regular rate and rhythm; No murmurs, rubs, or gallops  ABDOMEN: Soft, Nontender, Nondistended; Bowel sounds present  EXTREMITIES:  2+ Peripheral Pulses, No clubbing, cyanosis, or edema  LYMPH: No lymphadenopathy noted  SKIN: No rashes or lesions    LABS:                        10.8   8.75  )-----------( 211      ( 29 Mar 2025 07:22 )             31.8     03-29    133[L]  |  104  |  24[H]  ----------------------------<  378[H]  3.8   |  23  |  1.38[H]    Ca    8.6      29 Mar 2025 07:22  Phos  3.1     03-29  Mg     1.6     03-29        Urinalysis Basic - ( 29 Mar 2025 07:22 )    Color: x / Appearance: x / SG: x / pH: x  Gluc: 378 mg/dL / Ketone: x  / Bili: x / Urobili: x   Blood: x / Protein: x / Nitrite: x   Leuk Esterase: x / RBC: x / WBC x   Sq Epi: x / Non Sq Epi: x / Bacteria: x      CAPILLARY BLOOD GLUCOSE      POCT Blood Glucose.: 170 mg/dL (30 Mar 2025 21:23)  POCT Blood Glucose.: 210 mg/dL (30 Mar 2025 16:34)  POCT Blood Glucose.: 348 mg/dL (30 Mar 2025 11:11)  POCT Blood Glucose.: 240 mg/dL (30 Mar 2025 07:42)      RADIOLOGY & ADDITIONAL TESTS:    Imaging Personally Reviewed:  [ ] YES  [ ] NO    Consultant(s) Notes Reviewed:  [ ] YES  [ ] NO    Care Discussed with Consultants/Other Providers [ ] YES  [ ] NO
HPI:  74 years old female with h/o HTN, HLD, DM, Hypothyroidism  ( h/o hyperthyroid, previously treated with thionamide, later developed hypothyroidism) present to ED with generalized weakness. Patient reported generalized weakness, polydipsia, polyuria for 2 weeks. Initially stated that she ran out of medications for 2 weeks but after telling patient that she last picked up DM medication was in 08/2024 per surescript, patient stated that it sounds about right. No fever or chills.   Hypertensive, afebrile, sat well atRA. EKG with SNR. WBC 11.37, plt 270, Na 125 ( corrected Na 134), glucose 672, Cr 1.65 ( 1.33 in 08/2024), TSH 0.131. UA dirty.  (25 Mar 2025 13:29)  Patient is a 74y old  Female who presents with a chief complaint of severe hyperglycemia, UTI (31 Mar 2025 22:58)      INTERVAL HPI/OVERNIGHT EVENTS: no acute eventrs     MEDICATIONS  (STANDING):  amLODIPine   Tablet 10 milliGRAM(s) Oral daily  atorvastatin 10 milliGRAM(s) Oral at bedtime  dextrose 5%. 1000 milliLiter(s) (100 mL/Hr) IV Continuous <Continuous>  dextrose 5%. 1000 milliLiter(s) (50 mL/Hr) IV Continuous <Continuous>  dextrose 50% Injectable 25 Gram(s) IV Push once  dextrose 50% Injectable 12.5 Gram(s) IV Push once  dextrose 50% Injectable 25 Gram(s) IV Push once  glucagon  Injectable 1 milliGRAM(s) IntraMuscular once  heparin   Injectable 5000 Unit(s) SubCutaneous every 8 hours  insulin glargine Injectable (LANTUS) 26 Unit(s) SubCutaneous every morning  insulin lispro (ADMELOG) corrective regimen sliding scale   SubCutaneous three times a day before meals  insulin lispro (ADMELOG) corrective regimen sliding scale   SubCutaneous at bedtime  insulin lispro Injectable (ADMELOG) 9 Unit(s) SubCutaneous three times a day before meals  losartan 25 milliGRAM(s) Oral daily  metFORMIN 500 milliGRAM(s) Oral two times a day  nystatin Cream 1 Application(s) Topical two times a day    MEDICATIONS  (PRN):  acetaminophen     Tablet .. 650 milliGRAM(s) Oral every 6 hours PRN Mild Pain (1 - 3), Moderate Pain (4 - 6)  dextrose Oral Gel 15 Gram(s) Oral once PRN Blood Glucose LESS THAN 70 milliGRAM(s)/deciliter  melatonin 3 milliGRAM(s) Oral at bedtime PRN Insomnia      Allergies    No Known Allergies    Intolerances        REVIEW OF SYSTEMS:  CONSTITUTIONAL: No fever, weight loss, or fatigue  EYES: No eye pain, visual disturbances, or discharge  ENMT:  No difficulty hearing, tinnitus, vertigo; No sinus or throat pain  NECK: No pain or stiffness  BREASTS: No pain, masses, or nipple discharge  RESPIRATORY: No cough, wheezing, chills or hemoptysis; No shortness of breath  CARDIOVASCULAR: No chest pain, palpitations, dizziness, or leg swelling  GASTROINTESTINAL: No abdominal or epigastric pain. No nausea, vomiting, or hematemesis; No diarrhea or constipation. No melena or hematochezia.  GENITOURINARY: No dysuria, frequency, hematuria, or incontinence  NEUROLOGICAL: No headaches, memory loss, loss of strength, numbness, or tremors  SKIN: No itching, burning, rashes, or lesions   LYMPH NODES: No enlarged glands  ENDOCRINE: No heat or cold intolerance; No hair loss  MUSCULOSKELETAL: No joint pain or swelling; No muscle, back, or extremity pain  PSYCHIATRIC: No depression, anxiety, mood swings, or difficulty sleeping  HEME/LYMPH: No easy bruising, or bleeding gums  ALLERGY AND IMMUNOLOGIC: No hives or eczema    Vital Signs Last 24 Hrs  T(C): 36.9 (31 Mar 2025 16:58), Max: 36.9 (31 Mar 2025 11:08)  T(F): 98.4 (31 Mar 2025 16:58), Max: 98.4 (31 Mar 2025 11:08)  HR: 78 (31 Mar 2025 16:58) (71 - 84)  BP: 132/77 (31 Mar 2025 16:58) (132/77 - 145/73)  BP(mean): --  RR: 20 (31 Mar 2025 16:58) (17 - 20)  SpO2: 93% (31 Mar 2025 16:58) (93% - 97%)    Parameters below as of 31 Mar 2025 16:58  Patient On (Oxygen Delivery Method): room air        PHYSICAL EXAM:  GENERAL: NAD, well-groomed, well-developed  HEAD:  Atraumatic, Normocephalic  EYES: EOMI, PERRLA, conjunctiva and sclera clear  ENMT: No tonsillar erythema, exudates, or enlargement; Moist mucous membranes, Good dentition, No lesions  NECK: Supple, No JVD, Normal thyroid  NERVOUS SYSTEM:  Alert & Oriented X3, Good concentration; Motor Strength 5/5 B/L upper and lower extremities; DTRs 2+ intact and symmetric  CHEST/LUNG: Clear to ascultation  bilaterally; No rales, rhonchi, wheezing, or rubs  HEART: Regular rate and rhythm; No murmurs, rubs, or gallops  ABDOMEN: Soft, Nontender, Nondistended; Bowel sounds present  EXTREMITIES:  2+ Peripheral Pulses, No clubbing, cyanosis, or edema  LYMPH: No lymphadenopathy noted  SKIN: No rashes or lesions    LABS:              CAPILLARY BLOOD GLUCOSE      POCT Blood Glucose.: 167 mg/dL (31 Mar 2025 21:19)  POCT Blood Glucose.: 203 mg/dL (31 Mar 2025 16:16)  POCT Blood Glucose.: 292 mg/dL (31 Mar 2025 11:24)  POCT Blood Glucose.: 236 mg/dL (31 Mar 2025 07:47)      RADIOLOGY & ADDITIONAL TESTS:    Imaging Personally Reviewed:  [ ] YES  [ ] NO    Consultant(s) Notes Reviewed:  [ ] YES  [ ] NO    Care Discussed with Consultants/Other Providers [ ] YES  [ ] NO
Patient is a 74y old  Female who presents with a chief complaint of severe hyperglycemia, UTI (26 Mar 2025 12:09)      Interval History: Patient is on 12 units of Lantus and 4 units of prandial lispro and lispro coverage with meals.  Fingersticks are >500.  Patient has increased amount of insulin resistance.  At home she was on Janumet so metformin has to be added back and current insulin regimen continued  She was on 100 mcg of levothyroxine and TSH is 0.1 and total T4 is very much increased.  May need to check free thyroxine as a baseline but decrease the dose of levothyroxine to 75 mcg in 1 to 2 days    MEDICATIONS  (STANDING):  amLODIPine   Tablet 10 milliGRAM(s) Oral daily  atorvastatin 10 milliGRAM(s) Oral at bedtime  cefTRIAXone   IVPB 1000 milliGRAM(s) IV Intermittent every 24 hours  dextrose 5%. 1000 milliLiter(s) (50 mL/Hr) IV Continuous <Continuous>  dextrose 5%. 1000 milliLiter(s) (100 mL/Hr) IV Continuous <Continuous>  dextrose 50% Injectable 25 Gram(s) IV Push once  dextrose 50% Injectable 12.5 Gram(s) IV Push once  dextrose 50% Injectable 25 Gram(s) IV Push once  glucagon  Injectable 1 milliGRAM(s) IntraMuscular once  heparin   Injectable 5000 Unit(s) SubCutaneous every 8 hours  insulin glargine Injectable (LANTUS) 12 Unit(s) SubCutaneous every morning  insulin lispro (ADMELOG) corrective regimen sliding scale   SubCutaneous three times a day before meals  insulin lispro (ADMELOG) corrective regimen sliding scale   SubCutaneous at bedtime  insulin lispro Injectable (ADMELOG) 4 Unit(s) SubCutaneous three times a day before meals  insulin lispro Injectable (ADMELOG). 12 Unit(s) SubCutaneous once  lactated ringers. 1000 milliLiter(s) (150 mL/Hr) IV Continuous <Continuous>  metFORMIN 500 milliGRAM(s) Oral two times a day  nystatin Cream 1 Application(s) Topical two times a day    MEDICATIONS  (PRN):  acetaminophen     Tablet .. 650 milliGRAM(s) Oral every 6 hours PRN Mild Pain (1 - 3), Moderate Pain (4 - 6)  dextrose Oral Gel 15 Gram(s) Oral once PRN Blood Glucose LESS THAN 70 milliGRAM(s)/deciliter  melatonin 3 milliGRAM(s) Oral at bedtime PRN Insomnia      Allergies    No Known Allergies    Intolerances        REVIEW OF SYSTEMS:  CONSTITUTIONAL: no changes  EYES: No eye pain, visual disturbances, or discharge  ENMT:  No difficulty hearing, No sinus or throat pain  NECK: No pain or stiffness  RESPIRATORY: No cough, wheezing, chills or hemoptysis; No shortness of breath  CARDIOVASCULAR: No chest pain, palpitations or leg swelling  GASTROINTESTINAL: No abdominal or epigastric pain. No nausea, vomiting, or hematemesis; No diarrhea or constipation. No melena or hematochezia.  GENITOURINARY: No dysuria, frequency, hematuria, or incontinence  NEUROLOGICAL: No headaches, memory loss, loss of strength, numbness, or tremors  SKIN: No itching, burning, rashes, or lesions   ENDOCRINE: No heat or cold intolerance; No hair loss  MUSCULOSKELETAL: No joint pain or swelling; No muscle, back, or extremity pain  PSYCHIATRIC: No depression, anxiety, mood swings, or difficulty sleeping  HEME/LYMPH: No easy bruising, or bleeding gums  ALLERY AND IMMUNOLOGIC: No hives or eczema    Vital Signs Last 24 Hrs  T(C): 36.9 (26 Mar 2025 16:34), Max: 37.1 (25 Mar 2025 23:08)  T(F): 98.5 (26 Mar 2025 16:34), Max: 98.7 (25 Mar 2025 23:08)  HR: 76 (26 Mar 2025 16:34) (63 - 76)  BP: 138/70 (26 Mar 2025 16:34) (130/66 - 158/79)  BP(mean): --  RR: 18 (26 Mar 2025 16:34) (16 - 18)  SpO2: 96% (26 Mar 2025 16:34) (95% - 97%)    Parameters below as of 26 Mar 2025 16:34  Patient On (Oxygen Delivery Method): room air        PHYSICAL EXAM:  GENERAL:   HEAD: Atraumatic, Normocephalic  EYES: PERRLA, conjunctiva and sclera clear  ENMT: No  exudates,; Moist mucous membranes,, No lesions  NECK: Supple, No JVD, Normal thyroid  NERVOUS SYSTEM:  Alert & Oriented,   CHEST/LUNG: Clear to auscultation bilaterally; No rales, rhonchi, wheezing, or rubs  HEART: Regular rate and rhythm; No murmurs, rubs, or gallops  ABDOMEN: Soft, Nontender, Nondistended; Bowel sounds present  EXTREMITIES:  2+ Peripheral Pulses, no edema  SKIN: No rashes or lesions    LABS:      Urinalysis Basic - ( 26 Mar 2025 06:20 )    Color: x / Appearance: x / SG: x / pH: x  Gluc: 188 mg/dL / Ketone: x  / Bili: x / Urobili: x   Blood: x / Protein: x / Nitrite: x   Leuk Esterase: x / RBC: x / WBC x   Sq Epi: x / Non Sq Epi: x / Bacteria: x      CAPILLARY BLOOD GLUCOSE      POCT Blood Glucose.: 556 mg/dL (26 Mar 2025 22:20)  POCT Blood Glucose.: 572 mg/dL (26 Mar 2025 21:15)  POCT Blood Glucose.: 534 mg/dL (26 Mar 2025 21:13)  POCT Blood Glucose.: 387 mg/dL (26 Mar 2025 16:16)  POCT Blood Glucose.: 366 mg/dL (26 Mar 2025 11:13)  POCT Blood Glucose.: 248 mg/dL (26 Mar 2025 07:28)    Lipid panel:           Thyroid:  Diabetes Tests:  Parathyroid Panel:  Adrenals:  RADIOLOGY & ADDITIONAL TESTS:    Imaging Personally Reviewed:  [ ] YES  [ ] NO    Consultant(s) Notes Reviewed:  [ ] YES  [ ] NO    Care Discussed with Consultants/Other Providers [ ] YES  [ ] NO
Patient is a 74y old  Female who presents with a chief complaint of severe hyperglycemia, UTI (27 Mar 2025 22:15)      Interval History: finger sticks are now in 200s   on Lantus 26 units and prandial lispro 9 units and Lispro sliding scale coverage with meals and Metformin 500 mg BID   also off levothyroxine as TSH is 0.1     MEDICATIONS  (STANDING):  amLODIPine   Tablet 10 milliGRAM(s) Oral daily  atorvastatin 10 milliGRAM(s) Oral at bedtime  dextrose 5%. 1000 milliLiter(s) (50 mL/Hr) IV Continuous <Continuous>  dextrose 5%. 1000 milliLiter(s) (100 mL/Hr) IV Continuous <Continuous>  dextrose 50% Injectable 25 Gram(s) IV Push once  dextrose 50% Injectable 12.5 Gram(s) IV Push once  dextrose 50% Injectable 25 Gram(s) IV Push once  glucagon  Injectable 1 milliGRAM(s) IntraMuscular once  heparin   Injectable 5000 Unit(s) SubCutaneous every 8 hours  insulin glargine Injectable (LANTUS) 26 Unit(s) SubCutaneous every morning  insulin lispro (ADMELOG) corrective regimen sliding scale   SubCutaneous three times a day before meals  insulin lispro (ADMELOG) corrective regimen sliding scale   SubCutaneous at bedtime  insulin lispro Injectable (ADMELOG) 9 Unit(s) SubCutaneous three times a day before meals  lactated ringers. 1000 milliLiter(s) (150 mL/Hr) IV Continuous <Continuous>  losartan 25 milliGRAM(s) Oral daily  metFORMIN 500 milliGRAM(s) Oral two times a day  nystatin Cream 1 Application(s) Topical two times a day    MEDICATIONS  (PRN):  acetaminophen     Tablet .. 650 milliGRAM(s) Oral every 6 hours PRN Mild Pain (1 - 3), Moderate Pain (4 - 6)  dextrose Oral Gel 15 Gram(s) Oral once PRN Blood Glucose LESS THAN 70 milliGRAM(s)/deciliter  melatonin 3 milliGRAM(s) Oral at bedtime PRN Insomnia      Allergies    No Known Allergies    Intolerances        REVIEW OF SYSTEMS:  CONSTITUTIONAL: no changes  EYES: No eye pain, visual disturbances, or discharge  ENMT:  No difficulty hearing, No sinus or throat pain  NECK: No pain or stiffness  RESPIRATORY: No cough, wheezing, chills or hemoptysis; No shortness of breath  CARDIOVASCULAR: No chest pain, palpitations or leg swelling  GASTROINTESTINAL: No abdominal or epigastric pain. No nausea, vomiting, or hematemesis; No diarrhea or constipation. No melena or hematochezia.  GENITOURINARY: No dysuria, frequency, hematuria, or incontinence  NEUROLOGICAL: No headaches, memory loss, loss of strength, numbness, or tremors  SKIN: No itching, burning, rashes, or lesions   ENDOCRINE: No heat or cold intolerance; No hair loss  MUSCULOSKELETAL: No joint pain or swelling; No muscle, back, or extremity pain  PSYCHIATRIC: No depression, anxiety, mood swings, or difficulty sleeping  HEME/LYMPH: No easy bruising, or bleeding gums  ALLERY AND IMMUNOLOGIC: No hives or eczema    Vital Signs Last 24 Hrs  T(C): 36.9 (28 Mar 2025 16:51), Max: 36.9 (27 Mar 2025 23:28)  T(F): 98.5 (28 Mar 2025 16:51), Max: 98.5 (27 Mar 2025 23:28)  HR: 80 (28 Mar 2025 16:51) (74 - 84)  BP: 134/76 (28 Mar 2025 16:51) (125/70 - 153/85)  BP(mean): --  RR: 18 (28 Mar 2025 16:51) (17 - 18)  SpO2: 93% (28 Mar 2025 16:51) (93% - 94%)    Parameters below as of 28 Mar 2025 16:51  Patient On (Oxygen Delivery Method): room air        PHYSICAL EXAM:  GENERAL:   HEAD: Atraumatic, Normocephalic  EYES: PERRLA, conjunctiva and sclera clear  ENMT: No  exudates,; Moist mucous membranes,, No lesions  NECK: Supple, No JVD, Normal thyroid  NERVOUS SYSTEM:  Alert & Oriented,   CHEST/LUNG: Clear to auscultation bilaterally; No rales, rhonchi, wheezing, or rubs  HEART: Regular rate and rhythm; No murmurs, rubs, or gallops  ABDOMEN: Soft, Nontender, Nondistended; Bowel sounds present  EXTREMITIES:  2+ Peripheral Pulses, no edema  SKIN: No rashes or lesions    LABS:      Urinalysis Basic - ( 28 Mar 2025 07:15 )    Color: x / Appearance: x / SG: x / pH: x  Gluc: 405 mg/dL / Ketone: x  / Bili: x / Urobili: x   Blood: x / Protein: x / Nitrite: x   Leuk Esterase: x / RBC: x / WBC x   Sq Epi: x / Non Sq Epi: x / Bacteria: x      CAPILLARY BLOOD GLUCOSE      POCT Blood Glucose.: 271 mg/dL (28 Mar 2025 16:07)  POCT Blood Glucose.: 318 mg/dL (28 Mar 2025 11:18)  POCT Blood Glucose.: 415 mg/dL (28 Mar 2025 07:54)  POCT Blood Glucose.: 435 mg/dL (28 Mar 2025 07:50)    Lipid panel:           Thyroid:  Diabetes Tests:  Parathyroid Panel:  Adrenals:  RADIOLOGY & ADDITIONAL TESTS:    Imaging Personally Reviewed:  [ ] YES  [ ] NO    Consultant(s) Notes Reviewed:  [ ] YES  [ ] NO    Care Discussed with Consultants/Other Providers [ ] YES  [ ] NO
Patient is a 74y old  Female who presents with a chief complaint of severe hyperglycemia, UTI (29 Mar 2025 19:49)      Interval History: Finger-sticks are in high 100's and low 200's with occasional increase of > 300   on no levothyroxine  as TSH was 0.1     MEDICATIONS  (STANDING):  amLODIPine   Tablet 10 milliGRAM(s) Oral daily  atorvastatin 10 milliGRAM(s) Oral at bedtime  dextrose 5%. 1000 milliLiter(s) (100 mL/Hr) IV Continuous <Continuous>  dextrose 5%. 1000 milliLiter(s) (50 mL/Hr) IV Continuous <Continuous>  dextrose 50% Injectable 25 Gram(s) IV Push once  dextrose 50% Injectable 12.5 Gram(s) IV Push once  dextrose 50% Injectable 25 Gram(s) IV Push once  glucagon  Injectable 1 milliGRAM(s) IntraMuscular once  heparin   Injectable 5000 Unit(s) SubCutaneous every 8 hours  insulin glargine Injectable (LANTUS) 26 Unit(s) SubCutaneous every morning  insulin lispro (ADMELOG) corrective regimen sliding scale   SubCutaneous three times a day before meals  insulin lispro (ADMELOG) corrective regimen sliding scale   SubCutaneous at bedtime  insulin lispro Injectable (ADMELOG) 9 Unit(s) SubCutaneous three times a day before meals  losartan 25 milliGRAM(s) Oral daily  metFORMIN 500 milliGRAM(s) Oral two times a day  nystatin Cream 1 Application(s) Topical two times a day    MEDICATIONS  (PRN):  acetaminophen     Tablet .. 650 milliGRAM(s) Oral every 6 hours PRN Mild Pain (1 - 3), Moderate Pain (4 - 6)  dextrose Oral Gel 15 Gram(s) Oral once PRN Blood Glucose LESS THAN 70 milliGRAM(s)/deciliter  melatonin 3 milliGRAM(s) Oral at bedtime PRN Insomnia      Allergies    No Known Allergies    Intolerances        REVIEW OF SYSTEMS:  CONSTITUTIONAL: no changes  EYES: No eye pain, visual disturbances, or discharge  ENMT:  No difficulty hearing, No sinus or throat pain  NECK: No pain or stiffness  RESPIRATORY: No cough, wheezing, chills or hemoptysis; No shortness of breath  CARDIOVASCULAR: No chest pain, palpitations or leg swelling  GASTROINTESTINAL: No abdominal or epigastric pain. No nausea, vomiting, or hematemesis; No diarrhea or constipation. No melena or hematochezia.  GENITOURINARY: No dysuria, frequency, hematuria, or incontinence  NEUROLOGICAL: No headaches, memory loss, loss of strength, numbness, or tremors  SKIN: No itching, burning, rashes, or lesions   ENDOCRINE: No heat or cold intolerance; No hair loss  MUSCULOSKELETAL: No joint pain or swelling; No muscle, back, or extremity pain  PSYCHIATRIC: No depression, anxiety, mood swings, or difficulty sleeping  HEME/LYMPH: No easy bruising, or bleeding gums  ALLERY AND IMMUNOLOGIC: No hives or eczema    Vital Signs Last 24 Hrs  T(C): 36.6 (30 Mar 2025 17:41), Max: 36.9 (29 Mar 2025 23:46)  T(F): 97.9 (30 Mar 2025 17:41), Max: 98.5 (29 Mar 2025 23:46)  HR: 78 (30 Mar 2025 17:41) (69 - 78)  BP: 134/76 (30 Mar 2025 17:41) (109/70 - 145/77)  BP(mean): --  RR: 18 (30 Mar 2025 17:41) (17 - 19)  SpO2: 94% (30 Mar 2025 17:41) (93% - 96%)    Parameters below as of 30 Mar 2025 17:41  Patient On (Oxygen Delivery Method): room air        PHYSICAL EXAM:  GENERAL:   HEAD: Atraumatic, Normocephalic  EYES: PERRLA, conjunctiva and sclera clear  ENMT: No  exudates,; Moist mucous membranes,, No lesions  NECK: Supple, No JVD, Normal thyroid  NERVOUS SYSTEM:  Alert & Oriented,   CHEST/LUNG: Clear to auscultation bilaterally; No rales, rhonchi, wheezing, or rubs  HEART: Regular rate and rhythm; No murmurs, rubs, or gallops  ABDOMEN: Soft, Nontender, Nondistended; Bowel sounds present  EXTREMITIES:  2+ Peripheral Pulses, no edema  SKIN: No rashes or lesions    LABS:      Urinalysis Basic - ( 29 Mar 2025 07:22 )    Color: x / Appearance: x / SG: x / pH: x  Gluc: 378 mg/dL / Ketone: x  / Bili: x / Urobili: x   Blood: x / Protein: x / Nitrite: x   Leuk Esterase: x / RBC: x / WBC x   Sq Epi: x / Non Sq Epi: x / Bacteria: x      CAPILLARY BLOOD GLUCOSE      POCT Blood Glucose.: 210 mg/dL (30 Mar 2025 16:34)  POCT Blood Glucose.: 348 mg/dL (30 Mar 2025 11:11)  POCT Blood Glucose.: 240 mg/dL (30 Mar 2025 07:42)  POCT Blood Glucose.: 191 mg/dL (29 Mar 2025 21:30)    Lipid panel:           Thyroid:  Diabetes Tests:  Parathyroid Panel:  Adrenals:  RADIOLOGY & ADDITIONAL TESTS:    Imaging Personally Reviewed:  [ ] YES  [ ] NO    Consultant(s) Notes Reviewed:  [ ] YES  [ ] NO    Care Discussed with Consultants/Other Providers [ ] YES  [ ] NO
HPI:  74 years old female with h/o HTN, HLD, DM, Hypothyroidism  ( h/o hyperthyroid, previously treated with thionamide, later developed hypothyroidism) present to ED with generalized weakness. Patient reported generalized weakness, polydipsia, polyuria for 2 weeks. Initially stated that she ran out of medications for 2 weeks but after telling patient that she last picked up DM medication was in 08/2024 per surescript, patient stated that it sounds about right. No fever or chills.   Hypertensive, afebrile, sat well atRA. EKG with SNR. WBC 11.37, plt 270, Na 125 ( corrected Na 134), glucose 672, Cr 1.65 ( 1.33 in 08/2024), TSH 0.131. UA dirty.  (25 Mar 2025 13:29)  Patient is a 74y old  Female who presents with a chief complaint of severe hyperglycemia, UTI (28 Mar 2025 21:14)      INTERVAL HPI/OVERNIGHT EVENTS: no acute events     MEDICATIONS  (STANDING):  amLODIPine   Tablet 10 milliGRAM(s) Oral daily  atorvastatin 10 milliGRAM(s) Oral at bedtime  dextrose 5%. 1000 milliLiter(s) (50 mL/Hr) IV Continuous <Continuous>  dextrose 5%. 1000 milliLiter(s) (100 mL/Hr) IV Continuous <Continuous>  dextrose 50% Injectable 25 Gram(s) IV Push once  dextrose 50% Injectable 12.5 Gram(s) IV Push once  dextrose 50% Injectable 25 Gram(s) IV Push once  glucagon  Injectable 1 milliGRAM(s) IntraMuscular once  heparin   Injectable 5000 Unit(s) SubCutaneous every 8 hours  insulin glargine Injectable (LANTUS) 26 Unit(s) SubCutaneous every morning  insulin lispro (ADMELOG) corrective regimen sliding scale   SubCutaneous three times a day before meals  insulin lispro (ADMELOG) corrective regimen sliding scale   SubCutaneous at bedtime  insulin lispro Injectable (ADMELOG) 9 Unit(s) SubCutaneous three times a day before meals  losartan 25 milliGRAM(s) Oral daily  metFORMIN 500 milliGRAM(s) Oral two times a day  nystatin Cream 1 Application(s) Topical two times a day    MEDICATIONS  (PRN):  acetaminophen     Tablet .. 650 milliGRAM(s) Oral every 6 hours PRN Mild Pain (1 - 3), Moderate Pain (4 - 6)  dextrose Oral Gel 15 Gram(s) Oral once PRN Blood Glucose LESS THAN 70 milliGRAM(s)/deciliter  melatonin 3 milliGRAM(s) Oral at bedtime PRN Insomnia      Allergies    No Known Allergies    Intolerances        REVIEW OF SYSTEMS:  CONSTITUTIONAL: No fever, weight loss, or fatigue  EYES: No eye pain, visual disturbances, or discharge  ENMT:  No difficulty hearing, tinnitus, vertigo; No sinus or throat pain  NECK: No pain or stiffness  BREASTS: No pain, masses, or nipple discharge  RESPIRATORY: No cough, wheezing, chills or hemoptysis; No shortness of breath  CARDIOVASCULAR: No chest pain, palpitations, dizziness, or leg swelling  GASTROINTESTINAL: No abdominal or epigastric pain. No nausea, vomiting, or hematemesis; No diarrhea or constipation. No melena or hematochezia.  GENITOURINARY: No dysuria, frequency, hematuria, or incontinence  NEUROLOGICAL: No headaches, memory loss, loss of strength, numbness, or tremors  SKIN: No itching, burning, rashes, or lesions   LYMPH NODES: No enlarged glands  ENDOCRINE: No heat or cold intolerance; No hair loss  MUSCULOSKELETAL: No joint pain or swelling; No muscle, back, or extremity pain  PSYCHIATRIC: No depression, anxiety, mood swings, or difficulty sleeping  HEME/LYMPH: No easy bruising, or bleeding gums  ALLERGY AND IMMUNOLOGIC: No hives or eczema    Vital Signs Last 24 Hrs  T(C): 36.9 (28 Mar 2025 16:51), Max: 36.9 (27 Mar 2025 23:28)  T(F): 98.5 (28 Mar 2025 16:51), Max: 98.5 (27 Mar 2025 23:28)  HR: 80 (28 Mar 2025 16:51) (74 - 84)  BP: 134/76 (28 Mar 2025 16:51) (125/70 - 153/85)  RR: 18 (28 Mar 2025 16:51) (17 - 18)  SpO2: 93% (28 Mar 2025 16:51) (93% - 94%)    Parameters below as of 28 Mar 2025 16:51  Patient On (Oxygen Delivery Method): room air        PHYSICAL EXAM:  GENERAL: NAD, well-groomed, well-developed  HEAD:  Atraumatic, Normocephalic  EYES: EOMI, PERRLA, conjunctiva and sclera clear  ENMT: No tonsillar erythema, exudates, or enlargement; Moist mucous membranes, Good dentition, No lesions  NECK: Supple, No JVD, Normal thyroid  NERVOUS SYSTEM:  Alert & Oriented X3, Good concentration; Motor Strength 5/5 B/L upper and lower extremities; DTRs 2+ intact and symmetric  CHEST/LUNG: Clear to ascultation  bilaterally; No rales, rhonchi, wheezing, or rubs  HEART: Regular rate and rhythm; No murmurs, rubs, or gallops  ABDOMEN: Soft, Nontender, Nondistended; Bowel sounds present  EXTREMITIES:  2+ Peripheral Pulses, No clubbing, cyanosis, or edema  LYMPH: No lymphadenopathy noted  SKIN: No rashes or lesions    LABS:                        11.1   8.32  )-----------( 208      ( 27 Mar 2025 07:10 )             31.8     03-28    134[L]  |  104  |  22  ----------------------------<  405[H]  3.9   |  24  |  1.27    Ca    8.6      28 Mar 2025 07:15        Urinalysis Basic - ( 28 Mar 2025 07:15 )    Color: x / Appearance: x / SG: x / pH: x  Gluc: 405 mg/dL / Ketone: x  / Bili: x / Urobili: x   Blood: x / Protein: x / Nitrite: x   Leuk Esterase: x / RBC: x / WBC x   Sq Epi: x / Non Sq Epi: x / Bacteria: x      CAPILLARY BLOOD GLUCOSE      POCT Blood Glucose.: 237 mg/dL (28 Mar 2025 21:25)  POCT Blood Glucose.: 271 mg/dL (28 Mar 2025 16:07)  POCT Blood Glucose.: 318 mg/dL (28 Mar 2025 11:18)  POCT Blood Glucose.: 415 mg/dL (28 Mar 2025 07:54)  POCT Blood Glucose.: 435 mg/dL (28 Mar 2025 07:50)      RADIOLOGY & ADDITIONAL TESTS:    Imaging Personally Reviewed:  [ ] YES  [ ] NO    Consultant(s) Notes Reviewed:  [ ] YES  [ ] NO    Care Discussed with Consultants/Other Providers [ ] YES  [ ] NO
Parker Gallegos M.D.    Patient is a 74y old  Female who presents with a chief complaint of severe hyperglycemia, UTI (26 Mar 2025 15:45)      SUBJECTIVE / OVERNIGHT EVENTS: persistent hyperglycemia overnight.     Patient denies chest pain, SOB, abd pain, N/V, fever, chills, dysuria or any other complaints. All remainder ROS negative.     MEDICATIONS  (STANDING):  amLODIPine   Tablet 10 milliGRAM(s) Oral daily  atorvastatin 10 milliGRAM(s) Oral at bedtime  cefTRIAXone   IVPB 1000 milliGRAM(s) IV Intermittent every 24 hours  dextrose 5%. 1000 milliLiter(s) (50 mL/Hr) IV Continuous <Continuous>  dextrose 5%. 1000 milliLiter(s) (100 mL/Hr) IV Continuous <Continuous>  dextrose 50% Injectable 25 Gram(s) IV Push once  dextrose 50% Injectable 12.5 Gram(s) IV Push once  dextrose 50% Injectable 25 Gram(s) IV Push once  glucagon  Injectable 1 milliGRAM(s) IntraMuscular once  heparin   Injectable 5000 Unit(s) SubCutaneous every 8 hours  insulin lispro (ADMELOG) corrective regimen sliding scale   SubCutaneous three times a day before meals  insulin lispro (ADMELOG) corrective regimen sliding scale   SubCutaneous at bedtime  insulin lispro Injectable (ADMELOG) 9 Unit(s) SubCutaneous three times a day before meals  lactated ringers. 1000 milliLiter(s) (150 mL/Hr) IV Continuous <Continuous>  metFORMIN 500 milliGRAM(s) Oral two times a day  nystatin Cream 1 Application(s) Topical two times a day  potassium chloride    Tablet ER 40 milliEquivalent(s) Oral once    MEDICATIONS  (PRN):  acetaminophen     Tablet .. 650 milliGRAM(s) Oral every 6 hours PRN Mild Pain (1 - 3), Moderate Pain (4 - 6)  dextrose Oral Gel 15 Gram(s) Oral once PRN Blood Glucose LESS THAN 70 milliGRAM(s)/deciliter  melatonin 3 milliGRAM(s) Oral at bedtime PRN Insomnia      I&O's Summary    26 Mar 2025 07:01  -  27 Mar 2025 07:00  --------------------------------------------------------  IN: 1475 mL / OUT: 2900 mL / NET: -1425 mL        PHYSICAL EXAM:  Vital Signs Last 24 Hrs  T(C): 36.9 (27 Mar 2025 06:05), Max: 37.2 (26 Mar 2025 23:10)  T(F): 98.4 (27 Mar 2025 06:05), Max: 99 (26 Mar 2025 23:10)  HR: 68 (27 Mar 2025 06:05) (68 - 83)  BP: 167/83 (27 Mar 2025 06:05) (138/70 - 167/83)  BP(mean): --  RR: 17 (27 Mar 2025 06:05) (17 - 18)  SpO2: 95% (27 Mar 2025 06:05) (94% - 96%)    Parameters below as of 26 Mar 2025 16:34  Patient On (Oxygen Delivery Method): room air    CONSTITUTIONAL: NAD, well-groomed  RESPIRATORY: Normal respiratory effort; lungs are clear to auscultation bilaterally  CARDIOVASCULAR: Regular rate and rhythm, normal S1 and S2, no LE edema  ABDOMEN: Nontender to palpation, normoactive bowel sounds    LABS:                        11.1   8.32  )-----------( 208      ( 27 Mar 2025 07:10 )             31.8     03-27    135  |  105  |  20  ----------------------------<  324[H]  3.4[L]   |  22  |  1.11    Ca    8.6      27 Mar 2025 07:10  Phos  2.6     03-26  Mg     1.6     03-26    TPro  6.0  /  Alb  2.6[L]  /  TBili  0.6  /  DBili  x   /  AST  39[H]  /  ALT  36  /  AlkPhos  87  03-26          Urinalysis Basic - ( 27 Mar 2025 07:10 )    Color: x / Appearance: x / SG: x / pH: x  Gluc: 324 mg/dL / Ketone: x  / Bili: x / Urobili: x   Blood: x / Protein: x / Nitrite: x   Leuk Esterase: x / RBC: x / WBC x   Sq Epi: x / Non Sq Epi: x / Bacteria: x        Urinalysis with Rflx Culture (collected 25 Mar 2025 11:12)      CAPILLARY BLOOD GLUCOSE      POCT Blood Glucose.: 387 mg/dL (27 Mar 2025 07:47)  POCT Blood Glucose.: 327 mg/dL (27 Mar 2025 06:38)  POCT Blood Glucose.: 556 mg/dL (26 Mar 2025 22:20)  POCT Blood Glucose.: 572 mg/dL (26 Mar 2025 21:15)  POCT Blood Glucose.: 534 mg/dL (26 Mar 2025 21:13)  POCT Blood Glucose.: 387 mg/dL (26 Mar 2025 16:16)  POCT Blood Glucose.: 366 mg/dL (26 Mar 2025 11:13)      RADIOLOGY & ADDITIONAL TESTS:  Results Reviewed:   Imaging Personally Reviewed:  Electrocardiogram Personally Reviewed:
Patient is a 74y old  Female who presents with a chief complaint of severe hyperglycemia, UTI (01 Apr 2025 16:02)      Interval History: discharged earlier   repeat thyroid function tests off levothyroxine within normal limits   finger sticks are in 200s     MEDICATIONS  (STANDING):  amLODIPine   Tablet 10 milliGRAM(s) Oral daily  atorvastatin 10 milliGRAM(s) Oral at bedtime  dextrose 5%. 1000 milliLiter(s) (100 mL/Hr) IV Continuous <Continuous>  dextrose 5%. 1000 milliLiter(s) (50 mL/Hr) IV Continuous <Continuous>  dextrose 50% Injectable 25 Gram(s) IV Push once  dextrose 50% Injectable 12.5 Gram(s) IV Push once  dextrose 50% Injectable 25 Gram(s) IV Push once  glucagon  Injectable 1 milliGRAM(s) IntraMuscular once  heparin   Injectable 5000 Unit(s) SubCutaneous every 8 hours  insulin glargine Injectable (LANTUS) 26 Unit(s) SubCutaneous every morning  insulin lispro (ADMELOG) corrective regimen sliding scale   SubCutaneous three times a day before meals  insulin lispro (ADMELOG) corrective regimen sliding scale   SubCutaneous at bedtime  insulin lispro Injectable (ADMELOG) 9 Unit(s) SubCutaneous three times a day before meals  losartan 25 milliGRAM(s) Oral daily  metFORMIN 500 milliGRAM(s) Oral two times a day  nystatin Cream 1 Application(s) Topical two times a day    MEDICATIONS  (PRN):  acetaminophen     Tablet .. 650 milliGRAM(s) Oral every 6 hours PRN Mild Pain (1 - 3), Moderate Pain (4 - 6)  dextrose Oral Gel 15 Gram(s) Oral once PRN Blood Glucose LESS THAN 70 milliGRAM(s)/deciliter  melatonin 3 milliGRAM(s) Oral at bedtime PRN Insomnia      Allergies    No Known Allergies    Intolerances        REVIEW OF SYSTEMS:      Vital Signs Last 24 Hrs  T(C): 37.1 (01 Apr 2025 15:35), Max: 37.1 (31 Mar 2025 23:33)  T(F): 98.8 (01 Apr 2025 15:35), Max: 98.8 (31 Mar 2025 23:33)  HR: 80 (01 Apr 2025 15:35) (74 - 80)  BP: 128/68 (01 Apr 2025 15:35) (121/73 - 158/79)  BP(mean): --  RR: 19 (01 Apr 2025 15:35) (18 - 19)  SpO2: 97% (01 Apr 2025 15:35) (94% - 97%)    Parameters below as of 01 Apr 2025 15:35  Patient On (Oxygen Delivery Method): room air        PHYSICAL EXAM:  GENERAL:   deferred     LABS:      Urinalysis Basic - ( 01 Apr 2025 05:50 )    Color: x / Appearance: x / SG: x / pH: x  Gluc: 158 mg/dL / Ketone: x  / Bili: x / Urobili: x   Blood: x / Protein: x / Nitrite: x   Leuk Esterase: x / RBC: x / WBC x   Sq Epi: x / Non Sq Epi: x / Bacteria: x      CAPILLARY BLOOD GLUCOSE      POCT Blood Glucose.: 241 mg/dL (01 Apr 2025 16:22)  POCT Blood Glucose.: 179 mg/dL (01 Apr 2025 10:59)  POCT Blood Glucose.: 205 mg/dL (01 Apr 2025 07:36)    Lipid panel:           Thyroid:04-01 @ 05:50 TSH 2.330 <<Free T4>> -- <<T3>> 96 <<TG Ab>> -- <<ATPOAB>> -- <<TBG>> -- <<TSI>> -- Reverse T3 --    Diabetes Tests:  Parathyroid Panel:  Adrenals:  RADIOLOGY & ADDITIONAL TESTS:    Imaging Personally Reviewed:  [ ] YES  [ ] NO    Consultant(s) Notes Reviewed:  [ ] YES  [ ] NO    Care Discussed with Consultants/Other Providers [ ] YES  [ ] NO
Patient is a 74y old  Female who presents with a chief complaint of severe hyperglycemia, UTI (27 Mar 2025 10:21)      Interval History: Slightly decreasing fingersticks but still increased and in the 300s   On Lantus and prandial lispro same dose  Taken off any levothyroxine TSH is 0.1..  Levothyroxine dose on hold.    MEDICATIONS  (STANDING):  amLODIPine   Tablet 10 milliGRAM(s) Oral daily  atorvastatin 10 milliGRAM(s) Oral at bedtime  cefTRIAXone   IVPB 1000 milliGRAM(s) IV Intermittent every 24 hours  dextrose 5%. 1000 milliLiter(s) (50 mL/Hr) IV Continuous <Continuous>  dextrose 5%. 1000 milliLiter(s) (100 mL/Hr) IV Continuous <Continuous>  dextrose 50% Injectable 25 Gram(s) IV Push once  dextrose 50% Injectable 12.5 Gram(s) IV Push once  dextrose 50% Injectable 25 Gram(s) IV Push once  glucagon  Injectable 1 milliGRAM(s) IntraMuscular once  heparin   Injectable 5000 Unit(s) SubCutaneous every 8 hours  insulin lispro (ADMELOG) corrective regimen sliding scale   SubCutaneous three times a day before meals  insulin lispro (ADMELOG) corrective regimen sliding scale   SubCutaneous at bedtime  insulin lispro Injectable (ADMELOG) 9 Unit(s) SubCutaneous three times a day before meals  lactated ringers. 1000 milliLiter(s) (150 mL/Hr) IV Continuous <Continuous>  losartan 25 milliGRAM(s) Oral daily  metFORMIN 500 milliGRAM(s) Oral two times a day  nystatin Cream 1 Application(s) Topical two times a day    MEDICATIONS  (PRN):  acetaminophen     Tablet .. 650 milliGRAM(s) Oral every 6 hours PRN Mild Pain (1 - 3), Moderate Pain (4 - 6)  dextrose Oral Gel 15 Gram(s) Oral once PRN Blood Glucose LESS THAN 70 milliGRAM(s)/deciliter  melatonin 3 milliGRAM(s) Oral at bedtime PRN Insomnia      Allergies    No Known Allergies    Intolerances        REVIEW OF SYSTEMS:  CONSTITUTIONAL: no changes  EYES: No eye pain, visual disturbances, or discharge  ENMT:  No difficulty hearing, No sinus or throat pain  NECK: No pain or stiffness  RESPIRATORY: No cough, wheezing, chills or hemoptysis; No shortness of breath  CARDIOVASCULAR: No chest pain, palpitations or leg swelling  GASTROINTESTINAL: No abdominal or epigastric pain. No nausea, vomiting, or hematemesis; No diarrhea or constipation. No melena or hematochezia.  GENITOURINARY: No dysuria, frequency, hematuria, or incontinence  NEUROLOGICAL: No headaches, memory loss, loss of strength, numbness, or tremors  SKIN: No itching, burning, rashes, or lesions   ENDOCRINE: No heat or cold intolerance; No hair loss  MUSCULOSKELETAL: No joint pain or swelling; No muscle, back, or extremity pain  PSYCHIATRIC: No depression, anxiety, mood swings, or difficulty sleeping  HEME/LYMPH: No easy bruising, or bleeding gums  ALLERY AND IMMUNOLOGIC: No hives or eczema    Vital Signs Last 24 Hrs  T(C): 37.5 (27 Mar 2025 16:57), Max: 37.5 (27 Mar 2025 16:57)  T(F): 99.5 (27 Mar 2025 16:57), Max: 99.5 (27 Mar 2025 16:57)  HR: 80 (27 Mar 2025 16:57) (68 - 83)  BP: 136/79 (27 Mar 2025 16:57) (136/79 - 167/83)  BP(mean): --  RR: 18 (27 Mar 2025 16:57) (17 - 18)  SpO2: 96% (27 Mar 2025 16:57) (94% - 96%)    Parameters below as of 27 Mar 2025 16:57  Patient On (Oxygen Delivery Method): room air        PHYSICAL EXAM:  GENERAL:   HEAD: Atraumatic, Normocephalic  EYES: PERRLA, conjunctiva and sclera clear  ENMT: No  exudates,; Moist mucous membranes,, No lesions  NECK: Supple, No JVD, Normal thyroid  NERVOUS SYSTEM:  Alert & Oriented,   CHEST/LUNG: Clear to auscultation bilaterally; No rales, rhonchi, wheezing, or rubs  HEART: Regular rate and rhythm; No murmurs, rubs, or gallops  ABDOMEN: Soft, Nontender, Nondistended; Bowel sounds present  EXTREMITIES:  2+ Peripheral Pulses, no edema  SKIN: No rashes or lesions    LABS:      Urinalysis Basic - ( 27 Mar 2025 07:10 )    Color: x / Appearance: x / SG: x / pH: x  Gluc: 324 mg/dL / Ketone: x  / Bili: x / Urobili: x   Blood: x / Protein: x / Nitrite: x   Leuk Esterase: x / RBC: x / WBC x   Sq Epi: x / Non Sq Epi: x / Bacteria: x      CAPILLARY BLOOD GLUCOSE      POCT Blood Glucose.: 372 mg/dL (27 Mar 2025 21:12)  POCT Blood Glucose.: 381 mg/dL (27 Mar 2025 16:29)  POCT Blood Glucose.: 428 mg/dL (27 Mar 2025 12:44)  POCT Blood Glucose.: 427 mg/dL (27 Mar 2025 11:30)  POCT Blood Glucose.: 449 mg/dL (27 Mar 2025 11:28)  POCT Blood Glucose.: 387 mg/dL (27 Mar 2025 07:47)  POCT Blood Glucose.: 327 mg/dL (27 Mar 2025 06:38)  POCT Blood Glucose.: 556 mg/dL (26 Mar 2025 22:20)    Lipid panel:           Thyroid:03-27 @ 07:10 TSH -- <<Free T4>> 2.3 <<T3>> -- <<TG Ab>> -- <<ATPOAB>> -- <<TBG>> -- <<TSI>> -- Reverse T3 --    Diabetes Tests:  Parathyroid Panel:  Adrenals:  RADIOLOGY & ADDITIONAL TESTS:    Imaging Personally Reviewed:  [ ] YES  [ ] NO    Consultant(s) Notes Reviewed:  [ ] YES  [ ] NO    Care Discussed with Consultants/Other Providers [ ] YES  [ ] NO
Patient is a 74y old  Female who presents with a chief complaint of severe hyperglycemia, UTI (28 Mar 2025 22:28)      Interval History: Fingersticks are now <200, on 26 units of Lantus and 9 units of prandial lispro and 500 mg of metformin twice a day and decreasing glucose toxicity  Also of any levothyroxine at the TSH was 0.1    MEDICATIONS  (STANDING):  amLODIPine   Tablet 10 milliGRAM(s) Oral daily  atorvastatin 10 milliGRAM(s) Oral at bedtime  dextrose 5%. 1000 milliLiter(s) (50 mL/Hr) IV Continuous <Continuous>  dextrose 5%. 1000 milliLiter(s) (100 mL/Hr) IV Continuous <Continuous>  dextrose 50% Injectable 25 Gram(s) IV Push once  dextrose 50% Injectable 12.5 Gram(s) IV Push once  dextrose 50% Injectable 25 Gram(s) IV Push once  glucagon  Injectable 1 milliGRAM(s) IntraMuscular once  heparin   Injectable 5000 Unit(s) SubCutaneous every 8 hours  insulin glargine Injectable (LANTUS) 26 Unit(s) SubCutaneous every morning  insulin lispro (ADMELOG) corrective regimen sliding scale   SubCutaneous three times a day before meals  insulin lispro (ADMELOG) corrective regimen sliding scale   SubCutaneous at bedtime  insulin lispro Injectable (ADMELOG) 9 Unit(s) SubCutaneous three times a day before meals  losartan 25 milliGRAM(s) Oral daily  metFORMIN 500 milliGRAM(s) Oral two times a day  nystatin Cream 1 Application(s) Topical two times a day    MEDICATIONS  (PRN):  acetaminophen     Tablet .. 650 milliGRAM(s) Oral every 6 hours PRN Mild Pain (1 - 3), Moderate Pain (4 - 6)  dextrose Oral Gel 15 Gram(s) Oral once PRN Blood Glucose LESS THAN 70 milliGRAM(s)/deciliter  melatonin 3 milliGRAM(s) Oral at bedtime PRN Insomnia      Allergies    No Known Allergies    Intolerances        REVIEW OF SYSTEMS:  CONSTITUTIONAL: no changes  EYES: No eye pain, visual disturbances, or discharge  ENMT:  No difficulty hearing, No sinus or throat pain  NECK: No pain or stiffness  RESPIRATORY: No cough, wheezing, chills or hemoptysis; No shortness of breath  CARDIOVASCULAR: No chest pain, palpitations or leg swelling  GASTROINTESTINAL: No abdominal or epigastric pain. No nausea, vomiting, or hematemesis; No diarrhea or constipation. No melena or hematochezia.  GENITOURINARY: No dysuria, frequency, hematuria, or incontinence  NEUROLOGICAL: No headaches, memory loss, loss of strength, numbness, or tremors  SKIN: No itching, burning, rashes, or lesions   ENDOCRINE: No heat or cold intolerance; No hair loss  MUSCULOSKELETAL: No joint pain or swelling; No muscle, back, or extremity pain  PSYCHIATRIC: No depression, anxiety, mood swings, or difficulty sleeping  HEME/LYMPH: No easy bruising, or bleeding gums  ALLERY AND IMMUNOLOGIC: No hives or eczema    Vital Signs Last 24 Hrs  T(C): 37.2 (29 Mar 2025 17:45), Max: 37.2 (29 Mar 2025 17:45)  T(F): 99 (29 Mar 2025 17:45), Max: 99 (29 Mar 2025 17:45)  HR: 87 (29 Mar 2025 17:45) (76 - 87)  BP: 120/73 (29 Mar 2025 17:45) (120/73 - 146/79)  BP(mean): --  RR: 18 (29 Mar 2025 17:45) (17 - 18)  SpO2: 92% (29 Mar 2025 17:45) (92% - 95%)    Parameters below as of 29 Mar 2025 17:45  Patient On (Oxygen Delivery Method): room air        PHYSICAL EXAM:  GENERAL:   HEAD: Atraumatic, Normocephalic  EYES: PERRLA, conjunctiva and sclera clear  ENMT: No  exudates,; Moist mucous membranes,, No lesions  NECK: Supple, No JVD, Normal thyroid  NERVOUS SYSTEM:  Alert & Oriented,   CHEST/LUNG: Clear to auscultation bilaterally; No rales, rhonchi, wheezing, or rubs  HEART: Regular rate and rhythm; No murmurs, rubs, or gallops  ABDOMEN: Soft, Nontender, Nondistended; Bowel sounds present  EXTREMITIES:  2+ Peripheral Pulses, no edema  SKIN: No rashes or lesions    LABS:      Urinalysis Basic - ( 29 Mar 2025 07:22 )    Color: x / Appearance: x / SG: x / pH: x  Gluc: 378 mg/dL / Ketone: x  / Bili: x / Urobili: x   Blood: x / Protein: x / Nitrite: x   Leuk Esterase: x / RBC: x / WBC x   Sq Epi: x / Non Sq Epi: x / Bacteria: x      CAPILLARY BLOOD GLUCOSE      POCT Blood Glucose.: 176 mg/dL (29 Mar 2025 16:22)  POCT Blood Glucose.: 363 mg/dL (29 Mar 2025 11:54)  POCT Blood Glucose.: 352 mg/dL (29 Mar 2025 08:09)  POCT Blood Glucose.: 237 mg/dL (28 Mar 2025 21:25)    Lipid panel:           Thyroid:  Diabetes Tests:  Parathyroid Panel:  Adrenals:  RADIOLOGY & ADDITIONAL TESTS:    Imaging Personally Reviewed:  [ ] YES  [ ] NO    Consultant(s) Notes Reviewed:  [ ] YES  [ ] NO    Care Discussed with Consultants/Other Providers [ ] YES  [ ] NO
Patient is a 74y old  Female who presents with a chief complaint of severe hyperglycemia, UTI (30 Mar 2025 23:23)      Interval History: finger sticks are in high 100s with decreased glucose toxicity   on 26 units Lantus and prandial lispro 9 units and also Metformin low dose BID   on no levothyroxine     MEDICATIONS  (STANDING):  amLODIPine   Tablet 10 milliGRAM(s) Oral daily  atorvastatin 10 milliGRAM(s) Oral at bedtime  dextrose 5%. 1000 milliLiter(s) (100 mL/Hr) IV Continuous <Continuous>  dextrose 5%. 1000 milliLiter(s) (50 mL/Hr) IV Continuous <Continuous>  dextrose 50% Injectable 25 Gram(s) IV Push once  dextrose 50% Injectable 12.5 Gram(s) IV Push once  dextrose 50% Injectable 25 Gram(s) IV Push once  glucagon  Injectable 1 milliGRAM(s) IntraMuscular once  heparin   Injectable 5000 Unit(s) SubCutaneous every 8 hours  insulin glargine Injectable (LANTUS) 26 Unit(s) SubCutaneous every morning  insulin lispro (ADMELOG) corrective regimen sliding scale   SubCutaneous three times a day before meals  insulin lispro (ADMELOG) corrective regimen sliding scale   SubCutaneous at bedtime  insulin lispro Injectable (ADMELOG) 9 Unit(s) SubCutaneous three times a day before meals  losartan 25 milliGRAM(s) Oral daily  metFORMIN 500 milliGRAM(s) Oral two times a day  nystatin Cream 1 Application(s) Topical two times a day    MEDICATIONS  (PRN):  acetaminophen     Tablet .. 650 milliGRAM(s) Oral every 6 hours PRN Mild Pain (1 - 3), Moderate Pain (4 - 6)  dextrose Oral Gel 15 Gram(s) Oral once PRN Blood Glucose LESS THAN 70 milliGRAM(s)/deciliter  melatonin 3 milliGRAM(s) Oral at bedtime PRN Insomnia      Allergies    No Known Allergies    Intolerances        REVIEW OF SYSTEMS:  CONSTITUTIONAL: no changes  EYES: No eye pain, visual disturbances, or discharge  ENMT:  No difficulty hearing, No sinus or throat pain  NECK: No pain or stiffness  RESPIRATORY: No cough, wheezing, chills or hemoptysis; No shortness of breath  CARDIOVASCULAR: No chest pain, palpitations or leg swelling  GASTROINTESTINAL: No abdominal or epigastric pain. No nausea, vomiting, or hematemesis; No diarrhea or constipation. No melena or hematochezia.  GENITOURINARY: No dysuria, frequency, hematuria, or incontinence  NEUROLOGICAL: No headaches, memory loss, loss of strength, numbness, or tremors  SKIN: No itching, burning, rashes, or lesions   ENDOCRINE: No heat or cold intolerance; No hair loss  MUSCULOSKELETAL: No joint pain or swelling; No muscle, back, or extremity pain  PSYCHIATRIC: No depression, anxiety, mood swings, or difficulty sleeping  HEME/LYMPH: No easy bruising, or bleeding gums  ALLERY AND IMMUNOLOGIC: No hives or eczema    Vital Signs Last 24 Hrs  T(C): 36.9 (31 Mar 2025 16:58), Max: 36.9 (31 Mar 2025 11:08)  T(F): 98.4 (31 Mar 2025 16:58), Max: 98.4 (31 Mar 2025 11:08)  HR: 78 (31 Mar 2025 16:58) (71 - 84)  BP: 132/77 (31 Mar 2025 16:58) (132/77 - 145/73)  BP(mean): --  RR: 20 (31 Mar 2025 16:58) (17 - 20)  SpO2: 93% (31 Mar 2025 16:58) (93% - 97%)    Parameters below as of 31 Mar 2025 16:58  Patient On (Oxygen Delivery Method): room air        PHYSICAL EXAM:  GENERAL:   HEAD: Atraumatic, Normocephalic  EYES: PERRLA, conjunctiva and sclera clear  ENMT: No  exudates,; Moist mucous membranes,, No lesions  NECK: Supple, No JVD, Normal thyroid  NERVOUS SYSTEM:  Alert & Oriented,   CHEST/LUNG: Clear to auscultation bilaterally; No rales, rhonchi, wheezing, or rubs  HEART: Regular rate and rhythm; No murmurs, rubs, or gallops  ABDOMEN: Soft, Nontender, Nondistended; Bowel sounds present  EXTREMITIES:  2+ Peripheral Pulses, no edema  SKIN: No rashes or lesions    LABS:        CAPILLARY BLOOD GLUCOSE      POCT Blood Glucose.: 167 mg/dL (31 Mar 2025 21:19)  POCT Blood Glucose.: 203 mg/dL (31 Mar 2025 16:16)  POCT Blood Glucose.: 292 mg/dL (31 Mar 2025 11:24)  POCT Blood Glucose.: 236 mg/dL (31 Mar 2025 07:47)    Lipid panel:           Thyroid:03-31 @ 06:30 TSH 1.300 <<Free T4>> 1.2 <<T3>> -- <<TG Ab>> -- <<ATPOAB>> -- <<TBG>> -- <<TSI>> -- Reverse T3 --    Diabetes Tests:  Parathyroid Panel:  Adrenals:  RADIOLOGY & ADDITIONAL TESTS:    Imaging Personally Reviewed:  [ ] YES  [ ] NO    Consultant(s) Notes Reviewed:  [ ] YES  [ ] NO    Care Discussed with Consultants/Other Providers [ ] YES  [ ] NO
Parker Gallegos M.D.    Patient is a 74y old  Female who presents with a chief complaint of severe hyperglycemia, UTI (25 Mar 2025 19:15)      SUBJECTIVE / OVERNIGHT EVENTS: no event overnight. reports overall feeling better.     Patient denies chest pain, abd pain, N/V, fever, chills, dysuria or any other complaints. All remainder ROS negative.     MEDICATIONS  (STANDING):  amLODIPine   Tablet 10 milliGRAM(s) Oral daily  atorvastatin 10 milliGRAM(s) Oral at bedtime  cefTRIAXone   IVPB 1000 milliGRAM(s) IV Intermittent every 24 hours  dextrose 5%. 1000 milliLiter(s) (50 mL/Hr) IV Continuous <Continuous>  dextrose 5%. 1000 milliLiter(s) (100 mL/Hr) IV Continuous <Continuous>  dextrose 50% Injectable 25 Gram(s) IV Push once  dextrose 50% Injectable 12.5 Gram(s) IV Push once  dextrose 50% Injectable 25 Gram(s) IV Push once  glucagon  Injectable 1 milliGRAM(s) IntraMuscular once  heparin   Injectable 5000 Unit(s) SubCutaneous every 8 hours  insulin glargine Injectable (LANTUS) 12 Unit(s) SubCutaneous every morning  insulin lispro (ADMELOG) corrective regimen sliding scale   SubCutaneous three times a day before meals  insulin lispro (ADMELOG) corrective regimen sliding scale   SubCutaneous at bedtime  insulin lispro Injectable (ADMELOG) 4 Unit(s) SubCutaneous three times a day before meals  lactated ringers. 1000 milliLiter(s) (150 mL/Hr) IV Continuous <Continuous>    MEDICATIONS  (PRN):  acetaminophen     Tablet .. 650 milliGRAM(s) Oral every 6 hours PRN Mild Pain (1 - 3), Moderate Pain (4 - 6)  dextrose Oral Gel 15 Gram(s) Oral once PRN Blood Glucose LESS THAN 70 milliGRAM(s)/deciliter  melatonin 3 milliGRAM(s) Oral at bedtime PRN Insomnia      I&O's Summary    25 Mar 2025 07:01  -  26 Mar 2025 07:00  --------------------------------------------------------  IN: 450 mL / OUT: 1150 mL / NET: -700 mL        PHYSICAL EXAM:  Vital Signs Last 24 Hrs  T(C): 36.9 (26 Mar 2025 10:42), Max: 37.1 (25 Mar 2025 23:08)  T(F): 98.4 (26 Mar 2025 10:42), Max: 98.7 (25 Mar 2025 23:08)  HR: 71 (26 Mar 2025 10:42) (63 - 77)  BP: 142/66 (26 Mar 2025 10:42) (130/66 - 158/79)  BP(mean): --  RR: 18 (26 Mar 2025 10:42) (16 - 19)  SpO2: 95% (26 Mar 2025 10:42) (94% - 97%)    Parameters below as of 26 Mar 2025 10:42  Patient On (Oxygen Delivery Method): room air    CONSTITUTIONAL: NAD, well-groomed  RESPIRATORY: Normal respiratory effort; lungs are clear to auscultation bilaterally  CARDIOVASCULAR: Regular rate and rhythm, normal S1 and S2, no LE edema  ABDOMEN: Nontender to palpation, normoactive bowel sounds    LABS:                        11.4   9.38  )-----------( 209      ( 26 Mar 2025 06:20 )             33.1     03-26    135  |  106  |  20  ----------------------------<  188[H]  3.7   |  24  |  1.06    Ca    8.5      26 Mar 2025 06:20  Phos  2.6     03-26  Mg     1.6     03-26    TPro  6.0  /  Alb  2.6[L]  /  TBili  0.6  /  DBili  x   /  AST  39[H]  /  ALT  36  /  AlkPhos  87  03-26          Urinalysis Basic - ( 26 Mar 2025 06:20 )    Color: x / Appearance: x / SG: x / pH: x  Gluc: 188 mg/dL / Ketone: x  / Bili: x / Urobili: x   Blood: x / Protein: x / Nitrite: x   Leuk Esterase: x / RBC: x / WBC x   Sq Epi: x / Non Sq Epi: x / Bacteria: x        Urinalysis with Rflx Culture (collected 25 Mar 2025 11:12)      CAPILLARY BLOOD GLUCOSE      POCT Blood Glucose.: 366 mg/dL (26 Mar 2025 11:13)  POCT Blood Glucose.: 248 mg/dL (26 Mar 2025 07:28)  POCT Blood Glucose.: 451 mg/dL (25 Mar 2025 21:05)  POCT Blood Glucose.: 467 mg/dL (25 Mar 2025 21:03)  POCT Blood Glucose.: 439 mg/dL (25 Mar 2025 18:00)  POCT Blood Glucose.: 472 mg/dL (25 Mar 2025 16:45)  POCT Blood Glucose.: 563 mg/dL (25 Mar 2025 15:49)  POCT Blood Glucose.: >600 mg/dL (25 Mar 2025 15:43)  POCT Blood Glucose.: 510 mg/dL (25 Mar 2025 14:29)  POCT Blood Glucose.: 480 mg/dL (25 Mar 2025 13:08)  POCT Blood Glucose.: 505 mg/dL (25 Mar 2025 13:07)  POCT Blood Glucose.: 531 mg/dL (25 Mar 2025 12:37)  POCT Blood Glucose.: 557 mg/dL (25 Mar 2025 12:36)      RADIOLOGY & ADDITIONAL TESTS:  Results Reviewed:   Imaging Personally Reviewed:  Electrocardiogram Personally Reviewed:

## 2025-04-01 NOTE — PROGRESS NOTE ADULT - REASON FOR ADMISSION
severe hyperglycemia, UTI

## 2025-04-03 ENCOUNTER — EMERGENCY (EMERGENCY)
Facility: HOSPITAL | Age: 75
LOS: 0 days | Discharge: HOME | End: 2025-04-03
Payer: MEDICARE

## 2025-04-03 VITALS
WEIGHT: 132.06 LBS | DIASTOLIC BLOOD PRESSURE: 83 MMHG | OXYGEN SATURATION: 99 % | RESPIRATION RATE: 18 BRPM | TEMPERATURE: 98 F | HEART RATE: 92 BPM | SYSTOLIC BLOOD PRESSURE: 158 MMHG | HEIGHT: 64 IN

## 2025-04-03 VITALS
SYSTOLIC BLOOD PRESSURE: 137 MMHG | DIASTOLIC BLOOD PRESSURE: 80 MMHG | TEMPERATURE: 98 F | HEART RATE: 79 BPM | OXYGEN SATURATION: 97 % | RESPIRATION RATE: 17 BRPM

## 2025-04-03 LAB
ALBUMIN SERPL ELPH-MCNC: 3.1 G/DL — LOW (ref 3.3–5)
ALP SERPL-CCNC: 93 U/L — SIGNIFICANT CHANGE UP (ref 40–120)
ALT FLD-CCNC: 191 U/L — HIGH (ref 12–78)
ANION GAP SERPL CALC-SCNC: 5 MMOL/L — SIGNIFICANT CHANGE UP (ref 5–17)
AST SERPL-CCNC: 157 U/L — HIGH (ref 15–37)
BASOPHILS # BLD AUTO: 0.03 K/UL — SIGNIFICANT CHANGE UP (ref 0–0.2)
BILIRUB SERPL-MCNC: 0.3 MG/DL — SIGNIFICANT CHANGE UP (ref 0.2–1.2)
BUN SERPL-MCNC: 16 MG/DL — SIGNIFICANT CHANGE UP (ref 7–23)
CALCIUM SERPL-MCNC: 9 MG/DL — SIGNIFICANT CHANGE UP (ref 8.5–10.1)
CHLORIDE SERPL-SCNC: 108 MMOL/L — SIGNIFICANT CHANGE UP (ref 96–108)
CO2 SERPL-SCNC: 23 MMOL/L — SIGNIFICANT CHANGE UP (ref 22–31)
CREAT SERPL-MCNC: 1.06 MG/DL — SIGNIFICANT CHANGE UP (ref 0.5–1.3)
EGFR: 55 ML/MIN/1.73M2 — LOW
EGFR: 55 ML/MIN/1.73M2 — LOW
EOSINOPHIL # BLD AUTO: 0.06 K/UL — SIGNIFICANT CHANGE UP (ref 0–0.5)
EOSINOPHIL NFR BLD AUTO: 0.7 % — SIGNIFICANT CHANGE UP (ref 0–6)
GLUCOSE SERPL-MCNC: 233 MG/DL — HIGH (ref 70–99)
HGB BLD-MCNC: 11.4 G/DL — LOW (ref 11.5–15.5)
IMM GRANULOCYTES NFR BLD AUTO: 0.9 % — SIGNIFICANT CHANGE UP (ref 0–0.9)
LYMPHOCYTES # BLD AUTO: 2.02 K/UL — SIGNIFICANT CHANGE UP (ref 1–3.3)
LYMPHOCYTES # BLD AUTO: 23.4 % — SIGNIFICANT CHANGE UP (ref 13–44)
MCHC RBC-ENTMCNC: 31.6 PG — SIGNIFICANT CHANGE UP (ref 27–34)
MCHC RBC-ENTMCNC: 33.9 G/DL — SIGNIFICANT CHANGE UP (ref 32–36)
MCV RBC AUTO: 93.1 FL — SIGNIFICANT CHANGE UP (ref 80–100)
MONOCYTES # BLD AUTO: 0.54 K/UL — SIGNIFICANT CHANGE UP (ref 0–0.9)
NEUTROPHILS # BLD AUTO: 5.91 K/UL — SIGNIFICANT CHANGE UP (ref 1.8–7.4)
NEUTROPHILS NFR BLD AUTO: 68.4 % — SIGNIFICANT CHANGE UP (ref 43–77)
NRBC BLD AUTO-RTO: 0 /100 WBCS — SIGNIFICANT CHANGE UP (ref 0–0)
POTASSIUM SERPL-MCNC: 4.6 MMOL/L — SIGNIFICANT CHANGE UP (ref 3.5–5.3)
POTASSIUM SERPL-SCNC: 4.6 MMOL/L — SIGNIFICANT CHANGE UP (ref 3.5–5.3)
RBC # BLD: 3.61 M/UL — LOW (ref 3.8–5.2)
RBC # FLD: 12.9 % — SIGNIFICANT CHANGE UP (ref 10.3–14.5)
SODIUM SERPL-SCNC: 136 MMOL/L — SIGNIFICANT CHANGE UP (ref 135–145)
WBC # BLD: 8.64 K/UL — SIGNIFICANT CHANGE UP (ref 3.8–10.5)
WBC # FLD AUTO: 8.64 K/UL — SIGNIFICANT CHANGE UP (ref 3.8–10.5)

## 2025-04-03 PROCEDURE — 93010 ELECTROCARDIOGRAM REPORT: CPT

## 2025-04-03 PROCEDURE — 99285 EMERGENCY DEPT VISIT HI MDM: CPT

## 2025-04-03 PROCEDURE — 74177 CT ABD & PELVIS W/CONTRAST: CPT | Mod: 26

## 2025-04-03 RX ORDER — SITAGLIPTIN AND METFORMIN HYDROCHLORIDE 1000; 50 MG/1; MG/1
1 TABLET, FILM COATED ORAL
Qty: 14 | Refills: 0
Start: 2025-04-03 | End: 2025-04-09

## 2025-04-03 RX ORDER — INSULIN GLARGINE-YFGN 100 [IU]/ML
26 INJECTION, SOLUTION SUBCUTANEOUS
Qty: 1 | Refills: 0
Start: 2025-04-03 | End: 2025-04-09

## 2025-04-03 RX ORDER — INSULIN LISPRO 100 U/ML
10 INJECTION, SOLUTION INTRAVENOUS; SUBCUTANEOUS
Qty: 1 | Refills: 0
Start: 2025-04-03 | End: 2025-04-09

## 2025-04-03 RX ADMIN — Medication 1000 MILLILITER(S): at 15:22

## 2025-04-03 NOTE — ED ADULT TRIAGE NOTE - CHIEF COMPLAINT QUOTE
pt c/o dizziness, weakness in the legs and fatigue since this morning. states she feel like fainting.  pt was discharged form this hospital yesterday. denies chest pain or headache.  history of dm. htn and high cholesterol. fs 256

## 2025-04-03 NOTE — ED ADULT NURSE REASSESSMENT NOTE - NS ED NURSE REASSESS COMMENT FT1
pt's  demanding to leave with patient. pt attempt to leave with iv still intact, ivr removed.  is angry stating she only meds and she has been here for 4 hours. PA aware, this writer/RN convince patient and  to stay offered juice and snack. pt provided discharge papers. pt refused wheelchair to leave. pt used walker and ambulated out of ER with slow and steady gait.  at patients side. Noticed patient was wet when leaving offered and provided a clean dry undergarment. pt changed in bathroom and exited the ER with . pt was in no respiratory distress when leaving the ED.

## 2025-04-03 NOTE — ED PROVIDER NOTE - NSFOLLOWUPINSTRUCTIONS_ED_ALL_ED_FT
Follow-up with your Primary Care Physician within the next week.    Medications  - Please take all medications as prescribed by your Primary Care Physician.    Advance activity as tolerated.  Continue all previously prescribed medications as directed unless otherwise instructed.  Follow up with your primary care physician in 48-72 hours- bring copies of your results.  Return to the ER for worsening or persistent symptoms, and/or ANY NEW OR CONCERNING SYMPTOMS such as fever, chest pain, shortness of breath, abdominal pain, or headaches. If you have issues obtaining follow up, please call: [phone] to obtain a doctor or specialist who takes your insurance in your area.  You may call [phone] to make an appointment with the internal medicine clinic.    Type 2 diabetes (type 2 diabetes mellitus) is a long-term, or chronic, disease. In type 2 diabetes, one or both of these problems may be present:    The pancreas does not make enough of a hormone called insulin.  Cells in the body do not respond properly to insulin that the body makes (insulin resistance).    Normally, insulin allows blood sugar (glucose) to enter cells in the body. The cells use glucose for energy. Insulin resistance or lack of insulin causes excess glucose to build up in the blood instead of going into cells. This causes high blood glucose (hyperglycemia).     What are the causes?  The exact cause of type 2 diabetes is not known.    What increases the risk?  The following factors may make you more likely to develop this condition:    Having a family member with type 2 diabetes.  Being overweight or obese.  Being inactive (sedentary).  Having been diagnosed with insulin resistance.  Having a history of prediabetes, diabetes when you were pregnant (gestational diabetes), or polycystic ovary syndrome (PCOS).    What are the signs or symptoms?  In the early stage of this condition, you may not have symptoms. Symptoms develop slowly and may include:    Increased thirst or hunger.  Increased urination.  Unexplained weight loss.  Tiredness (fatigue) or weakness.  Vision changes, such as blurry vision.  Dark patches on the skin.    How is this diagnosed?     This condition is diagnosed based on your symptoms, your medical history, a physical exam, and your blood glucose level. Your blood glucose may be checked with one or more of the following blood tests:    A fasting blood glucose (FBG) test. You will not be allowed to eat (you will fast) for 8 hours or longer before a blood sample is taken.  A random blood glucose test. This test checks blood glucose at any time of day regardless of when you ate.  An A1C (hemoglobin A1C) blood test. This test provides information about blood glucose levels over the previous 2–3 months.  An oral glucose tolerance test (OGTT). This test measures your blood glucose at two times:    After fasting. This is your baseline blood glucose level.  Two hours after drinking a beverage that contains glucose.    You may be diagnosed with type 2 diabetes if:    Your fasting blood glucose level is 126 mg/dL (7.0 mmol/L) or higher.  Your random blood glucose level is 200 mg/dL (11.1 mmol/L) or higher.  Your A1C level is 6.5% or higher.  Your oral glucose tolerance test result is higher than 200 mg/dL (11.1 mmol/L).    These blood tests may be repeated to confirm your diagnosis.    How is this treated?  Your treatment may be managed by a specialist called an endocrinologist. Type 2 diabetes may be treated by following instructions from your health care provider about:    Making dietary and lifestyle changes. These may include:    Following a personalized nutrition plan that is developed by a registered dietitian.  Exercising regularly.  Finding ways to manage stress.  Checking your blood glucose level as often as told.  Taking diabetes medicines or insulin daily. This helps to keep your blood glucose levels in the healthy range.  Taking medicines to help prevent complications from diabetes. Medicines may include:    Aspirin.  Medicine to lower cholesterol.  Medicine to control blood pressure.    Your health care provider will set treatment goals for you. Your goals will be based on your age, other medical conditions you have, and how you respond to diabetes treatment. Generally, the goal of treatment is to maintain the following blood glucose levels:    Before meals: 80–130 mg/dL (4.4–7.2 mmol/L).  After meals: below 180 mg/dL (10 mmol/L).  A1C level: less than 7%.    Follow these instructions at home:    Questions to ask your health care provider    Consider asking the following questions:    Should I meet with a certified diabetes care and ?  What diabetes medicines do I need, and when should I take them?  What equipment will I need to manage my diabetes at home?  How often do I need to check my blood glucose?  Where can I find a support group for people with diabetes?  What number can I call if I have questions?  When is my next appointment?    General instructions    Take over-the-counter and prescription medicines only as told by your health care provider.  Keep all follow-up visits as told by your health care provider. This is important.    Where to find more information  American Diabetes Association (ADA): www.diabetes.org  American Association of Diabetes Care and Education Specialists (ADCES): www.diabeteseducator.org  International Diabetes Federation (IDF): www.idf.org    Contact a health care provider if:  Your blood glucose is at or above 240 mg/dL (13.3 mmol/L) for 2 days in a row.  You have been sick or have had a fever for 2 days or longer, and you are not getting better.  You have any of the following problems for more than 6 hours:    You cannot eat or drink.  You have nausea and vomiting.  You have diarrhea.    Get help right away if:  You have severe hypoglycemia. This means your blood glucose is lower than 54 mg/dL (3.0 mmol/L).  You become confused or you have trouble thinking clearly.  You have difficulty breathing.  You have moderate or large ketone levels in your urine.    These symptoms may represent a serious problem that is an emergency. Do not wait to see if the symptoms will go away. Get medical help right away. Call your local emergency services (911 in the [place] Do not drive yourself to the hospital.    Summary  Type 2 diabetes (type 2 diabetes mellitus) is a long-term, or chronic, disease. In type 2 diabetes, the pancreas does not make enough of a hormone called insulin, or cells in the body do not respond properly to insulin that the body makes (insulin resistance).  This condition is treated by making dietary and lifestyle changes and taking diabetes medicines or insulin.  Your health care provider will set treatment goals for you. Your goals will be based on your age, other medical conditions you have, and how you respond to diabetes treatment.  Keep all follow-up visits as told by your health care provider. This is important.    ADDITIONAL NOTES AND INSTRUCTIONS    Please follow up with your Primary MD in 24-48 hr.  Seek immediate medical care for any new/worsening signs or symptoms.

## 2025-04-03 NOTE — ED PROVIDER NOTE - PATIENT PORTAL LINK FT
You can access the FollowMyHealth Patient Portal offered by [facility] by registering at the following website: [email or web address]. By joining [employer]’s FollowMyHealth portal, you will also be able to view your health information using other applications (apps) compatible with our system.

## 2025-04-03 NOTE — ED ADULT NURSE NOTE - CAS DISCH CONDITION
07/04/24 1230   BP:  (!) 111/41 128/70 (!) 148/78   Pulse: 87 94 (!) 103 93   Resp: 16 16 20 20   Temp:       SpO2: 95% 90% 94% 96%   Weight:       Height:         MEDICATIONS GIVEN TO PATIENT THIS ENCOUNTER:  Orders Placed This Encounter   Medications    methylPREDNISolone sodium succ (SOLU-MEDROL) 125 mg in sterile water 2 mL injection    sodium chloride 0.9 % bolus 1,000 mL    albuterol (PROVENTIL) (2.5 MG/3ML) 0.083% nebulizer solution 2.5 mg     Order Specific Question:   Initiate RT Bronchodilator Protocol     Answer:   Yes - Inpatient Protocol    acetaZOLAMIDE (DIAMOX) 500 mg in sodium chloride 0.9 % 100 mL IVPB     DISCHARGE PRESCRIPTIONS:  New Prescriptions    No medications on file     PHYSICIAN CONSULTS ORDERED THIS ENCOUNTER:  IP CONSULT TO PRIMARY CARE PROVIDER  IP CONSULT TO PULMONOLOGY    FINAL IMPRESSION      1. Acute on chronic respiratory failure with hypoxia and hypercapnia (HCC)          DISPOSITION/PLAN   DISPOSITION Admitted 07/04/2024 12:48:51 PM      PATIENT REFERREDTO:  No follow-up provider specified.    DISCHARGEMEDICATIONS:  New Prescriptions    No medications on file       (Please note that portions of this note were completed with a voice recognition program.  Efforts were made to edit thedictations but occasionally words are mis-transcribed.)    Peter Currie MD  Attending Emergency Physician                        Peter Currie MD  07/04/24 [id number]    
Stable

## 2025-04-03 NOTE — ED PROVIDER NOTE - CARE PLAN
1 Principal Discharge DX:	Generalized weakness  Secondary Diagnosis:	Encounter for medication refill

## 2025-04-03 NOTE — ED ADULT NURSE NOTE - NSFALLRISKINTERV_ED_ALL_ED

## 2025-04-03 NOTE — ED PROVIDER NOTE - CARE PROVIDER_API CALL
Arnav Mtz  Endocrinology/Metab/Diabetes  [address], [address]  [place] [address]  Phone: [phone]  Fax: [phone]  Follow Up Time:

## 2025-04-03 NOTE — ED ADULT NURSE NOTE - OBJECTIVE STATEMENT
Covering for Primary RN. 74 yr old female AOx4. C/o dizziness and fatigue this morning. Pt reports being discharged yesterday from hospital for similar symptoms. Woke up still experiencing dizziness and fatigue. Dizziness worsened with movement. No neuro deficits. PMH fo DM, HTN, and HLD. Pt denies any pain, CP, SOB, N/V/D, fever/chills, h/a.

## 2025-04-03 NOTE — ED ADULT NURSE NOTE - CHPI ED NUR SYMPTOMS NEG
Alert and oriented to person, place and time
no blurred vision/no change in level of consciousness/no confusion/no fever/no loss of consciousness/no nausea/no numbness/no vomiting/no weakness

## 2025-04-03 NOTE — ED ADULT NURSE NOTE - CINV DISCH TEACH PARTICIP
Getting the lab work drawn before starting medication is ideal but I understand that it may be difficult to get him to physically cooperate to get to the clinic.  I am okay with you starting the medication without getting the labs drawn.  Hopefully within the next few months he will well start to cooperate and we could get the lab work drawn then.     Patient/Spouse

## 2025-04-04 DIAGNOSIS — J45.909 UNSPECIFIED ASTHMA, UNCOMPLICATED: ICD-10-CM

## 2025-04-04 DIAGNOSIS — Z86.718 PERSONAL HISTORY OF OTHER VENOUS THROMBOSIS AND EMBOLISM: ICD-10-CM

## 2025-04-04 DIAGNOSIS — R11.0 NAUSEA: ICD-10-CM

## 2025-04-04 DIAGNOSIS — Z91.013 ALLERGY TO SEAFOOD: ICD-10-CM

## 2025-04-04 DIAGNOSIS — Z79.899 OTHER LONG TERM (CURRENT) DRUG THERAPY: ICD-10-CM

## 2025-04-04 DIAGNOSIS — K59.00 CONSTIPATION, UNSPECIFIED: ICD-10-CM

## 2025-04-07 DIAGNOSIS — E86.0 DEHYDRATION: ICD-10-CM

## 2025-04-07 DIAGNOSIS — Z79.890 HORMONE REPLACEMENT THERAPY: ICD-10-CM

## 2025-04-07 DIAGNOSIS — I10 ESSENTIAL (PRIMARY) HYPERTENSION: ICD-10-CM

## 2025-04-07 DIAGNOSIS — E11.65 TYPE 2 DIABETES MELLITUS WITH HYPERGLYCEMIA: ICD-10-CM

## 2025-04-07 DIAGNOSIS — E03.9 HYPOTHYROIDISM, UNSPECIFIED: ICD-10-CM

## 2025-04-07 DIAGNOSIS — E78.5 HYPERLIPIDEMIA, UNSPECIFIED: ICD-10-CM

## 2025-04-07 DIAGNOSIS — Z79.84 LONG TERM (CURRENT) USE OF ORAL HYPOGLYCEMIC DRUGS: ICD-10-CM

## 2025-04-07 DIAGNOSIS — N30.00 ACUTE CYSTITIS WITHOUT HEMATURIA: ICD-10-CM

## 2025-04-07 DIAGNOSIS — N17.9 ACUTE KIDNEY FAILURE, UNSPECIFIED: ICD-10-CM

## 2025-04-07 DIAGNOSIS — B96.1 KLEBSIELLA PNEUMONIAE [K. PNEUMONIAE] AS THE CAUSE OF DISEASES CLASSIFIED ELSEWHERE: ICD-10-CM

## 2025-04-07 DIAGNOSIS — T38.3X6A UNDERDOSING OF INSULIN AND ORAL HYPOGLYCEMIC [ANTIDIABETIC] DRUGS, INITIAL ENCOUNTER: ICD-10-CM

## 2025-04-07 DIAGNOSIS — Z79.4 LONG TERM (CURRENT) USE OF INSULIN: ICD-10-CM

## 2025-05-06 ENCOUNTER — APPOINTMENT (OUTPATIENT)
Dept: ENDOCRINOLOGY | Facility: CLINIC | Age: 75
End: 2025-05-06